# Patient Record
Sex: MALE | Race: BLACK OR AFRICAN AMERICAN | NOT HISPANIC OR LATINO | Employment: UNEMPLOYED | URBAN - METROPOLITAN AREA
[De-identification: names, ages, dates, MRNs, and addresses within clinical notes are randomized per-mention and may not be internally consistent; named-entity substitution may affect disease eponyms.]

---

## 2017-02-16 ENCOUNTER — TRANSFERRED RECORDS (OUTPATIENT)
Dept: HEALTH INFORMATION MANAGEMENT | Facility: CLINIC | Age: 67
End: 2017-02-16

## 2017-02-28 ENCOUNTER — TRANSFERRED RECORDS (OUTPATIENT)
Dept: HEALTH INFORMATION MANAGEMENT | Facility: CLINIC | Age: 67
End: 2017-02-28

## 2017-04-26 ENCOUNTER — OFFICE VISIT (OUTPATIENT)
Dept: FAMILY MEDICINE | Facility: CLINIC | Age: 67
End: 2017-04-26
Payer: COMMERCIAL

## 2017-04-26 VITALS
HEIGHT: 68 IN | DIASTOLIC BLOOD PRESSURE: 68 MMHG | OXYGEN SATURATION: 99 % | BODY MASS INDEX: 26.52 KG/M2 | SYSTOLIC BLOOD PRESSURE: 152 MMHG | WEIGHT: 175 LBS | HEART RATE: 50 BPM

## 2017-04-26 DIAGNOSIS — M48.00 SPINAL STENOSIS, UNSPECIFIED SPINAL REGION: ICD-10-CM

## 2017-04-26 DIAGNOSIS — Z79.4 TYPE 2 DIABETES MELLITUS WITH DIABETIC POLYNEUROPATHY, WITH LONG-TERM CURRENT USE OF INSULIN (H): Primary | ICD-10-CM

## 2017-04-26 DIAGNOSIS — M54.9 CHRONIC BACK PAIN, UNSPECIFIED BACK LOCATION, UNSPECIFIED BACK PAIN LATERALITY: ICD-10-CM

## 2017-04-26 DIAGNOSIS — G89.29 CHRONIC BACK PAIN, UNSPECIFIED BACK LOCATION, UNSPECIFIED BACK PAIN LATERALITY: ICD-10-CM

## 2017-04-26 DIAGNOSIS — R30.0 DYSURIA: ICD-10-CM

## 2017-04-26 DIAGNOSIS — I10 BENIGN ESSENTIAL HYPERTENSION: ICD-10-CM

## 2017-04-26 DIAGNOSIS — R35.0 URINARY FREQUENCY: ICD-10-CM

## 2017-04-26 DIAGNOSIS — R32 URINARY INCONTINENCE, UNSPECIFIED TYPE: ICD-10-CM

## 2017-04-26 DIAGNOSIS — E11.42 TYPE 2 DIABETES MELLITUS WITH DIABETIC POLYNEUROPATHY, WITH LONG-TERM CURRENT USE OF INSULIN (H): Primary | ICD-10-CM

## 2017-04-26 DIAGNOSIS — K76.82 HEPATIC ENCEPHALOPATHY (H): ICD-10-CM

## 2017-04-26 DIAGNOSIS — R15.9 BOWEL INCONTINENCE: ICD-10-CM

## 2017-04-26 PROBLEM — Z72.0 TOBACCO USE: Status: ACTIVE | Noted: 2017-01-05

## 2017-04-26 PROBLEM — M47.816 OSTEOARTHRITIS OF LUMBAR SPINE: Status: ACTIVE | Noted: 2017-04-06

## 2017-04-26 PROBLEM — G96.198 ARACHNOID CYST OF SPINE: Status: ACTIVE | Noted: 2017-04-06
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
## 2017-04-26 PROBLEM — M54.50 CHRONIC LOW BACK PAIN: Status: ACTIVE | Noted: 2017-04-06

## 2017-04-26 LAB
AMMONIA PLAS-SCNC: 50 UMOL/L (ref 10–50)
ERYTHROCYTE [DISTWIDTH] IN BLOOD BY AUTOMATED COUNT: 13.4 % (ref 10–15)
HBA1C MFR BLD: 6 % (ref 4.3–6)
HCT VFR BLD AUTO: 37.7 % (ref 40–53)
HGB BLD-MCNC: 13.2 G/DL (ref 13.3–17.7)
MCH RBC QN AUTO: 32.4 PG (ref 26.5–33)
MCHC RBC AUTO-ENTMCNC: 35 G/DL (ref 31.5–36.5)
MCV RBC AUTO: 92 FL (ref 78–100)
PLATELET # BLD AUTO: 119 10E9/L (ref 150–450)
RBC # BLD AUTO: 4.08 10E12/L (ref 4.4–5.9)
VIT B12 SERPL-MCNC: 386 PG/ML (ref 193–986)
WBC # BLD AUTO: 6.7 10E9/L (ref 4–11)

## 2017-04-26 PROCEDURE — 83036 HEMOGLOBIN GLYCOSYLATED A1C: CPT | Performed by: NURSE PRACTITIONER

## 2017-04-26 PROCEDURE — 87088 URINE BACTERIA CULTURE: CPT | Performed by: NURSE PRACTITIONER

## 2017-04-26 PROCEDURE — 36415 COLL VENOUS BLD VENIPUNCTURE: CPT | Performed by: NURSE PRACTITIONER

## 2017-04-26 PROCEDURE — 85027 COMPLETE CBC AUTOMATED: CPT | Performed by: NURSE PRACTITIONER

## 2017-04-26 PROCEDURE — 87186 SC STD MICRODIL/AGAR DIL: CPT | Performed by: NURSE PRACTITIONER

## 2017-04-26 PROCEDURE — 84443 ASSAY THYROID STIM HORMONE: CPT | Performed by: NURSE PRACTITIONER

## 2017-04-26 PROCEDURE — 80053 COMPREHEN METABOLIC PANEL: CPT | Performed by: NURSE PRACTITIONER

## 2017-04-26 PROCEDURE — 99205 OFFICE O/P NEW HI 60 MIN: CPT | Performed by: NURSE PRACTITIONER

## 2017-04-26 PROCEDURE — 87086 URINE CULTURE/COLONY COUNT: CPT | Performed by: NURSE PRACTITIONER

## 2017-04-26 PROCEDURE — 82607 VITAMIN B-12: CPT | Performed by: NURSE PRACTITIONER

## 2017-04-26 PROCEDURE — 82140 ASSAY OF AMMONIA: CPT | Performed by: NURSE PRACTITIONER

## 2017-04-26 RX ORDER — METFORMIN HCL 500 MG
2000 TABLET, EXTENDED RELEASE 24 HR ORAL
COMMUNITY
End: 2019-06-19

## 2017-04-26 RX ORDER — LISINOPRIL 10 MG/1
10 TABLET ORAL DAILY
Qty: 90 TABLET | Refills: 3 | Status: SHIPPED | OUTPATIENT
Start: 2017-04-26 | End: 2017-11-14

## 2017-04-26 RX ORDER — LOSARTAN POTASSIUM 50 MG/1
50 TABLET ORAL DAILY
COMMUNITY
End: 2017-09-19

## 2017-04-26 RX ORDER — PREGABALIN 100 MG/1
100 CAPSULE ORAL 3 TIMES DAILY
Qty: 90 CAPSULE | Refills: 1 | Status: SHIPPED | OUTPATIENT
Start: 2017-04-26 | End: 2017-11-20

## 2017-04-26 RX ORDER — PROPRANOLOL HYDROCHLORIDE 20 MG/1
20 TABLET ORAL 2 TIMES DAILY
COMMUNITY
End: 2019-06-21

## 2017-04-26 RX ORDER — SIMVASTATIN 10 MG
10 TABLET ORAL AT BEDTIME
Qty: 90 TABLET | Refills: 1 | Status: SHIPPED | OUTPATIENT
Start: 2017-04-26 | End: 2017-11-06

## 2017-04-26 RX ORDER — GABAPENTIN 300 MG/1
300 CAPSULE ORAL 3 TIMES DAILY
COMMUNITY
End: 2017-04-26
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
## 2017-04-27 LAB
ALBUMIN SERPL-MCNC: 3 G/DL (ref 3.4–5)
ALP SERPL-CCNC: 100 U/L (ref 40–150)
ALT SERPL W P-5'-P-CCNC: 15 U/L (ref 0–70)
ANION GAP SERPL CALCULATED.3IONS-SCNC: 9 MMOL/L (ref 3–14)
AST SERPL W P-5'-P-CCNC: 17 U/L (ref 0–45)
BILIRUB SERPL-MCNC: 0.8 MG/DL (ref 0.2–1.3)
BUN SERPL-MCNC: 13 MG/DL (ref 7–30)
CALCIUM SERPL-MCNC: 9.4 MG/DL (ref 8.5–10.1)
CHLORIDE SERPL-SCNC: 110 MMOL/L (ref 94–109)
CO2 SERPL-SCNC: 24 MMOL/L (ref 20–32)
CREAT SERPL-MCNC: 1.12 MG/DL (ref 0.66–1.25)
GFR SERPL CREATININE-BSD FRML MDRD: 66 ML/MIN/1.7M2
GLUCOSE SERPL-MCNC: 77 MG/DL (ref 70–99)
POTASSIUM SERPL-SCNC: 4.6 MMOL/L (ref 3.4–5.3)
PROT SERPL-MCNC: 7.3 G/DL (ref 6.8–8.8)
SODIUM SERPL-SCNC: 143 MMOL/L (ref 133–144)
TSH SERPL DL<=0.05 MIU/L-ACNC: 0.78 MU/L (ref 0.4–4)

## 2017-04-28 RX ORDER — NITROFURANTOIN 25; 75 MG/1; MG/1
100 CAPSULE ORAL 2 TIMES DAILY
Qty: 14 CAPSULE | Refills: 0 | Status: SHIPPED | OUTPATIENT
Start: 2017-04-28 | End: 2017-11-06

## 2017-04-29 LAB
BACTERIA SPEC CULT: ABNORMAL
MICRO REPORT STATUS: ABNORMAL
MICROORGANISM SPEC CULT: ABNORMAL
SPECIMEN SOURCE: ABNORMAL

## 2017-05-08 ENCOUNTER — OFFICE VISIT (OUTPATIENT)
Dept: INTERNAL MEDICINE | Facility: CLINIC | Age: 67
End: 2017-05-08

## 2017-05-08 VITALS
BODY MASS INDEX: 26.73 KG/M2 | WEIGHT: 173.2 LBS | HEART RATE: 59 BPM | DIASTOLIC BLOOD PRESSURE: 76 MMHG | SYSTOLIC BLOOD PRESSURE: 152 MMHG

## 2017-05-08 DIAGNOSIS — M54.5 CHRONIC BILATERAL LOW BACK PAIN, WITH SCIATICA PRESENCE UNSPECIFIED: Primary | ICD-10-CM

## 2017-05-08 DIAGNOSIS — R32 URINARY INCONTINENCE, UNSPECIFIED TYPE: ICD-10-CM

## 2017-05-08 DIAGNOSIS — G89.29 CHRONIC BILATERAL LOW BACK PAIN, WITH SCIATICA PRESENCE UNSPECIFIED: Primary | ICD-10-CM

## 2017-05-08 DIAGNOSIS — F17.210 CIGARETTE NICOTINE DEPENDENCE WITHOUT COMPLICATION: ICD-10-CM

## 2017-05-08 DIAGNOSIS — R30.0 DYSURIA: ICD-10-CM

## 2017-05-08 RX ORDER — NICOTINE 21 MG/24HR
1 PATCH, TRANSDERMAL 24 HOURS TRANSDERMAL EVERY 24 HOURS
Qty: 30 PATCH | Refills: 3 | Status: SHIPPED | OUTPATIENT
Start: 2017-05-08 | End: 2019-06-19

## 2017-05-08 ASSESSMENT — ENCOUNTER SYMPTOMS
NAUSEA: 0
RECTAL BLEEDING: 0
RESPIRATORY PAIN: 0
SINUS CONGESTION: 0
SLEEP DISTURBANCES DUE TO BREATHING: 0
MUSCLE WEAKNESS: 1
ORTHOPNEA: 0
BLOOD IN STOOL: 0
TREMORS: 0
SYNCOPE: 0
DYSURIA: 1
MYALGIAS: 1
EYE WATERING: 1
DIZZINESS: 1
ABDOMINAL PAIN: 0
DEPRESSION: 0
WHEEZING: 0
HALLUCINATIONS: 0
NECK MASS: 0
SEIZURES: 0
MUSCLE CRAMPS: 1
HYPOTENSION: 0
DIARRHEA: 0
NUMBNESS: 1
CONSTIPATION: 1
TINGLING: 1
HEMATURIA: 0
SNORES LOUDLY: 1
TROUBLE SWALLOWING: 1
SPEECH CHANGE: 0
COUGH DISTURBING SLEEP: 1
DYSPNEA ON EXERTION: 0
POOR WOUND HEALING: 0
TASTE DISTURBANCE: 0
SMELL DISTURBANCE: 0
SORE THROAT: 0
WEIGHT LOSS: 0
FEVER: 0
HYPERTENSION: 1
HEMOPTYSIS: 0
HEARTBURN: 0
TACHYCARDIA: 0
POLYPHAGIA: 0
NAIL CHANGES: 0
NERVOUS/ANXIOUS: 1
INCREASED ENERGY: 0
DIFFICULTY URINATING: 1
ARTHRALGIAS: 1
VOMITING: 0
LIGHT-HEADEDNESS: 1
DECREASED CONCENTRATION: 1
RECTAL PAIN: 1
HOARSE VOICE: 0
EXERCISE INTOLERANCE: 0
DECREASED APPETITE: 0
SHORTNESS OF BREATH: 1
LEG SWELLING: 1
SWOLLEN GLANDS: 0
NIGHT SWEATS: 0
WEAKNESS: 1
BRUISES/BLEEDS EASILY: 0
COUGH: 0
HEADACHES: 1
SKIN CHANGES: 0
SINUS PAIN: 0
JAUNDICE: 0
BOWEL INCONTINENCE: 0
LOSS OF CONSCIOUSNESS: 0
BACK PAIN: 1
MEMORY LOSS: 1
PANIC: 0
JOINT SWELLING: 0
EYE PAIN: 0
INSOMNIA: 1
PARALYSIS: 0
EYE IRRITATION: 0
DISTURBANCES IN COORDINATION: 0
ALTERED TEMPERATURE REGULATION: 0
FLANK PAIN: 0
LEG PAIN: 1
DOUBLE VISION: 1
FATIGUE: 0
EYE REDNESS: 0
CHILLS: 0
POSTURAL DYSPNEA: 0
POLYDIPSIA: 0
STIFFNESS: 1
CLAUDICATION: 1
WEIGHT GAIN: 0
NECK PAIN: 1
PALPITATIONS: 0
BLOATING: 0

## 2017-05-08 ASSESSMENT — PAIN SCALES - GENERAL: PAINLEVEL: EXTREME PAIN (9)
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
## 2017-05-08 NOTE — PATIENT INSTRUCTIONS
[facility] Medication Refill Request Information:  * Please contact your pharmacy regarding ANY request for medication refills.  ** [facility] Prescription Fax = [phone]  * Please allow 3 business days for routine medication refills.  * Please allow 5 business days for controlled substance medication refills.     [facility] Test Result notification information:  *You will be notified with in 7-10 days of your appointment day regarding the results of your test.  If you are on MyChart you will be notified as soon as the provider has reviewed the results and signed off on them.    [facility] [phone]   Neurosurgery [phone]   Urology [phone]

## 2017-05-08 NOTE — MR AVS SNAPSHOT
After Visit Summary   5/8/2017    Javi Celaya    MRN: [id number]           Patient Information     Date Of Birth          1950        Visit Information        Provider Department      5/8/2017 2:30 PM Carolynn Zafar MD [facility]        Today's Diagnoses     Chronic bilateral low back pain, with sciatica presence unspecified    -  1    Urinary incontinence, unspecified type        Dysuria        Cigarette nicotine dependence without complication          Care Instructions    Primary Care Center Medication Refill Request Information:  * Please contact your pharmacy regarding ANY request for medication refills.  ** [facility] Prescription Fax = [phone]  * Please allow 3 business days for routine medication refills.  * Please allow 5 business days for controlled substance medication refills.     Primary Care Center Test Result notification information:  *You will be notified with in 7-10 days of your appointment day regarding the results of your test.  If you are on MyChart you will be notified as soon as the provider has reviewed the results and signed off on them.    Primary Care Center [phone]   Neurosurgery [phone]   Urology [phone]           Follow-ups after your visit        Additional Services     NEUROSURGERY REFERRAL       Your provider has referred you to: [facility]: Neurosurgery Clinic [facility] [phone]   [email or web address]    Please be aware that coverage of these services is subject to the terms and limitations of your health insurance plan.  Call member services at your health plan with any benefit or coverage questions.      Please bring the following with you to your appointment:    (1) Any X-Rays, CTs or MRIs which have been performed.  Contact the facility where they were done to arrange for  prior to your scheduled appointment.   (2) List of current medications  (3) This referral request    (4) Any documents/labs given to you for this referral            UROLOGY ADULT REFERRAL       Your provider has referred you to: [facility]: [place] [facility] [place] [phone]   [email or web address]    Please be aware that coverage of these services is subject to the terms and limitations of your health insurance plan.  Call member services at your health plan with any benefit or coverage questions.      Please bring the following with you to your appointment:    (1) Any X-Rays, CTs or MRIs which have been performed.  Contact the facility where they were done to arrange for  prior to your scheduled appointment.    (2) List of current medications  (3) This referral request   (4) Any documents/labs given to you for this referral                  Follow-up notes from your care team     Return in about 3 months (around 8/8/2017).      Your next 10 appointments already scheduled     Jul 19, 2017  9:00 AM CDT   (Arrive by 8:45 AM)   New Patient Visit with Miguel Angel Cooney [facility] ([facility])    [address] [place] [id number] [phone]              Future tests that were ordered for you today     Open Future Orders        Priority Expected Expires Ordered    UA with Micro reflex to Culture Routine 5/8/2017 5/8/2018 5/8/2017            Who to contact     Please call your clinic at [phone] to:    Ask questions about your health    Make or cancel appointments    Discuss your medicines    Learn about your test results    Speak to your doctor   If you have compliments or concerns about an experience at your clinic, or if you wish to file a complaint, please contact [facility] Patient Relations at [phone] or email us at [email or web address]         Additional Information  About Your Visit        Computer[employer] Information     [employer] is an electronic gateway that provides easy, online access to your medical records. With [employer], you can request a clinic appointment, read your test results, renew a prescription or communicate with your care team.     To sign up for [employer] visit the website at [email or web address]   You will be asked to enter the access code listed below, as well as some personal information. Please follow the directions to create your username and password.     Your access code is: [email or web address]  Expires: 2017  2:00 PM     Your access code will  in 90 days. If you need help or a new code, please contact your [facility] or call [phone] for assistance.        Care EveryWhere ID     This is your Care EveryWhere ID. This could be used by other organizations to access your [place] medical records  [id number]        Your Vitals Were     Pulse BMI (Body Mass Index)                59 26.73 kg/m2           Blood Pressure from Last 3 Encounters:   17 152/76   17 152/68    Weight from Last 3 Encounters:   17 78.6 kg (173 lb 3.2 oz)   17 79.4 kg (175 lb)              We Performed the Following     NEUROSURGERY REFERRAL     UROLOGY ADULT REFERRAL          Today's Medication Changes          These changes are accurate as of: 17  3:16 PM.  If you have any questions, ask your nurse or doctor.               Start taking these medicines.        Dose/Directions    nicotine 21 MG/24HR 24 hr patch   Commonly known as:  NICODERM CQ   Used for:  Cigarette nicotine dependence without complication   Started by:  Carolynn Zafar MD        Dose:  1 patch   Place 1 patch onto the skin every 24 hours   Quantity:  30 patch   Refills:  3            Where to get your medicines      [place] [facility] [place] [address], [place]  [address]     Phone:  [phone]     nicotine 21 MG/24HR 24 hr patch                Primary Care Provider Office Phone # Fax #    Carolynn Sariah Reardon [phone]       [address]  [facility] [address]        Thank you!     Thank you for choosing [facility]  for your care. Our goal is always to provide you with excellent care. Hearing back from our patients is one way we can continue to improve our services. Please take a few minutes to complete the written survey that you may receive in the mail after your visit with us. Thank you!             Your Updated Medication List - Protect others around you: Learn how to safely use, store and throw away your medicines at www.disposemymeds.org.          This list is accurate as of: 5/8/17  3:16 PM.  Always use your most recent med list.                   Brand Name Dispense Instructions for use    aspirin 81 MG EC tablet     90 tablet    Take 1 tablet (81 mg) by mouth daily       lisinopril 10 MG tablet    PRINIVIL/ZESTRIL    90 tablet    Take 1 tablet (10 mg) by mouth daily       losartan 50 MG tablet    COZAAR     Take 50 mg by mouth daily       metFORMIN 500 MG 24 hr tablet    GLUCOPHAGE-XR     Take 2,000 mg by mouth daily (with dinner)       nicotine 21 MG/24HR 24 hr patch    NICODERM CQ    30 patch    Place 1 patch onto the skin every 24 hours       nitrofurantoin (macrocrystal-monohydrate) 100 MG capsule    MACROBID    14 capsule    Take 1 capsule (100 mg) by mouth 2 times daily       pregabalin 100 MG capsule    LYRICA    90 capsule    Take 1 capsule (100 mg) by mouth 3 times daily       propranolol 20 MG tablet    INDERAL     Take 20 mg by mouth 2 times daily       simvastatin 10 MG tablet    ZOCOR    90 tablet    Take 1 tablet (10 mg) by mouth At Bedtime       XIFAXAN 550 MG Tabs tablet   Generic drug:  rifaximin      Take 200 mg by mouth 2 times daily
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
## 2017-05-26 ENCOUNTER — THERAPY VISIT (OUTPATIENT)
Dept: PHYSICAL THERAPY | Facility: CLINIC | Age: 67
End: 2017-05-26
Payer: COMMERCIAL

## 2017-05-26 DIAGNOSIS — M54.42 CHRONIC BILATERAL LOW BACK PAIN WITH BILATERAL SCIATICA: Primary | ICD-10-CM

## 2017-05-26 DIAGNOSIS — M54.41 CHRONIC BILATERAL LOW BACK PAIN WITH BILATERAL SCIATICA: Primary | ICD-10-CM

## 2017-05-26 DIAGNOSIS — G89.29 CHRONIC BILATERAL LOW BACK PAIN WITH BILATERAL SCIATICA: Primary | ICD-10-CM

## 2017-05-26 PROCEDURE — 97530 THERAPEUTIC ACTIVITIES: CPT | Mod: GP | Performed by: PHYSICAL THERAPIST

## 2017-05-26 PROCEDURE — 97162 PT EVAL MOD COMPLEX 30 MIN: CPT | Mod: GP | Performed by: PHYSICAL THERAPIST

## 2017-05-26 NOTE — MR AVS SNAPSHOT
After Visit Summary   5/26/2017    Javi Celaya    MRN: [id number]           Patient Information     Date Of Birth          1950        Visit Information        Provider Department      5/26/2017 1:20 PM Natalie Manzano PT [facility]        Today's Diagnoses     Chronic bilateral low back pain with bilateral sciatica    -  1       Follow-ups after your visit        Your next 10 appointments already scheduled     Jun 02, 2017  1:20 PM CDT   ALEX Spine with Natalie Manzano PT   [facility] ([facility]    [address] [place] [address] 5th [facility] [id number] [phone]            Jun 26, 2017  4:00 PM CDT   (Arrive by 3:45 PM)   New Patient Visit with Usman Em MD   [facility] ([facility]    [address] [place] [id number] [phone]            Jul 19, 2017  9:00 AM CDT   (Arrive by 8:45 AM)   New Patient Visit with Miguel Angel Cooney DO   [facility] ([facility]    [address] [place] [id number] [phone]              Who to contact     If you have questions or need follow up information about today's clinic visit or your schedule please contact [facility] THER[facility] directly at [phone].  Normal or non-critical lab and imaging results will be communicated to you by MyChart, letter or phone within 4 business days after the clinic has received the results. If you do not hear from us within 7 days, please contact the clinic through MyChart or phone. If you have a critical or abnormal lab result, we will notify you by phone as soon as possible.  Submit refill requests through [employer] or call your pharmacy and they will forward the refill request to us. Please allow 3 business days for your refill to  "be completed.          Additional Information About Your Visit        [employer] Information     [employer] lets you send messages to your doctor, view your test results, renew your prescriptions, schedule appointments and more. To sign up, go to [email or web address] . Click on \"Log in\" on the left side of the screen, which will take you to the Welcome page. Then click on \"Sign up Now\" on the right side of the page.     You will be asked to enter the access code listed below, as well as some personal information. Please follow the directions to create your username and password.     Your access code is: [email or web address]  Expires: 2017  2:00 PM     Your access code will  in 90 days. If you need help or a new code, please call your [place] clinic or [phone].        Care EveryWhere ID     This is your Care EveryWhere ID. This could be used by other organizations to access your [place] medical records  [id number]         Blood Pressure from Last 3 Encounters:   17 152/76   17 152/68    Weight from Last 3 Encounters:   17 78.6 kg (173 lb 3.2 oz)   17 79.4 kg (175 lb)              We Performed the Following     HC PT EVAL, MODERATE COMPLEXITY     ALEX INITIAL EVAL REPORT     THERAPEUTIC ACTIVITIES        Primary Care Provider Office Phone # Fax #    Carolynn Sariah Reardon [phone]       [address]  [facility] [address]        Thank you!     Thank you for choosing [facility]  for your care. Our goal is always to provide you with excellent care. Hearing back from our patients is one way we can continue to improve our services. Please take a few minutes to complete the written survey that you may receive in the mail after your visit with us. Thank you!             Your Updated Medication List - Protect others around you: Learn how to safely use, store and throw away your medicines at www.disposemymeds.org.        "   This list is accurate as of: 5/26/17  3:34 PM.  Always use your most recent med list.                   Brand Name Dispense Instructions for use    aspirin 81 MG EC tablet     90 tablet    Take 1 tablet (81 mg) by mouth daily       lisinopril 10 MG tablet    PRINIVIL/ZESTRIL    90 tablet    Take 1 tablet (10 mg) by mouth daily       losartan 50 MG tablet    COZAAR     Take 50 mg by mouth daily       metFORMIN 500 MG 24 hr tablet    GLUCOPHAGE-XR     Take 2,000 mg by mouth daily (with dinner)       nicotine 21 MG/24HR 24 hr patch    NICODERM CQ    30 patch    Place 1 patch onto the skin every 24 hours       nitrofurantoin (macrocrystal-monohydrate) 100 MG capsule    MACROBID    14 capsule    Take 1 capsule (100 mg) by mouth 2 times daily       pregabalin 100 MG capsule    LYRICA    90 capsule    Take 1 capsule (100 mg) by mouth 3 times daily       propranolol 20 MG tablet    INDERAL     Take 20 mg by mouth 2 times daily       simvastatin 10 MG tablet    ZOCOR    90 tablet    Take 1 tablet (10 mg) by mouth At Bedtime       XIFAXAN 550 MG Tabs tablet   Generic drug:  rifaximin      Take 200 mg by mouth 2 times daily
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
## 2017-05-30 ENCOUNTER — PRE VISIT (OUTPATIENT)
Dept: UROLOGY | Facility: CLINIC | Age: 67
End: 2017-05-30

## 2017-06-05 PROBLEM — G89.29 CHRONIC LOW BACK PAIN: Status: RESOLVED | Noted: 2017-04-06 | Resolved: 2017-06-05

## 2017-06-05 PROBLEM — M54.50 CHRONIC LOW BACK PAIN: Status: RESOLVED | Noted: 2017-04-06 | Resolved: 2017-06-05

## 2017-06-08 ENCOUNTER — MEDICAL CORRESPONDENCE (OUTPATIENT)
Dept: HEALTH INFORMATION MANAGEMENT | Facility: CLINIC | Age: 67
End: 2017-06-08

## 2017-06-15 ENCOUNTER — DOCUMENTATION ONLY (OUTPATIENT)
Dept: VASCULAR SURGERY | Facility: CLINIC | Age: 67
End: 2017-06-15

## 2017-06-15 DIAGNOSIS — Z13.6 ENCOUNTER FOR ABDOMINAL AORTIC ANEURYSM (AAA) SCREENING: Primary | ICD-10-CM

## 2017-06-22 ENCOUNTER — PRE VISIT (OUTPATIENT)
Dept: UROLOGY | Facility: CLINIC | Age: 67
End: 2017-06-22

## 2017-07-11 ENCOUNTER — PRE VISIT (OUTPATIENT)
Dept: UROLOGY | Facility: CLINIC | Age: 67
End: 2017-07-11

## 2017-07-11 NOTE — TELEPHONE ENCOUNTER
Patient with history of urinary incontinence coming in for consult. Patient contacted via phone and told to bring pads from the day before along with an unused dry one. Patient stated understanding.

## 2017-07-13 ENCOUNTER — PRE VISIT (OUTPATIENT)
Dept: ANESTHESIOLOGY | Facility: CLINIC | Age: 67
End: 2017-07-13

## 2017-07-13 NOTE — TELEPHONE ENCOUNTER
1.  Date/reason for appt: 7/21/17 9:40AM Chronic back pain, unspecified back location, unspecified back pain laterality Spinal stenosis, unspecified spinal region  2.  Referring provider: Amalia PACK CNP   3.  Call to patient (Yes / No - short description): No, recs are at .   4.  Previous care at / records requested from:  [facility] Beatrice Reardon MD -- Recs are in [facility] Jatinder SHAY - Attempt to fax cover sheet to [email or web address]. [email or web address]

## 2017-07-14 NOTE — TELEPHONE ENCOUNTER
Records received from [facility].   Included  ED notes: 4/18/12  Radiology reports: US venous duplex lower ext right on 4/18/12  Other: labs

## 2017-07-17 ENCOUNTER — OFFICE VISIT (OUTPATIENT)
Dept: UROLOGY | Facility: CLINIC | Age: 67
End: 2017-07-17

## 2017-07-17 VITALS
BODY MASS INDEX: 25.62 KG/M2 | DIASTOLIC BLOOD PRESSURE: 73 MMHG | WEIGHT: 173 LBS | HEART RATE: 85 BPM | HEIGHT: 69 IN | SYSTOLIC BLOOD PRESSURE: 152 MMHG

## 2017-07-17 DIAGNOSIS — N31.9 NEUROGENIC BLADDER: Primary | ICD-10-CM

## 2017-07-17 ASSESSMENT — ENCOUNTER SYMPTOMS
RECTAL PAIN: 0
HOARSE VOICE: 1
BOWEL INCONTINENCE: 0
JOINT SWELLING: 0
BACK PAIN: 1
HEADACHES: 0
EYE WATERING: 1
INSOMNIA: 1
WEIGHT LOSS: 1
BLOOD IN STOOL: 0
TREMORS: 0
DIARRHEA: 0
NUMBNESS: 1
HEARTBURN: 0
LOSS OF CONSCIOUSNESS: 0
WEAKNESS: 1
HEMATURIA: 0
NIGHT SWEATS: 1
PARALYSIS: 0
CHILLS: 0
FLANK PAIN: 0
POLYDIPSIA: 0
MUSCLE CRAMPS: 1
DECREASED CONCENTRATION: 1
TINGLING: 1
INCREASED ENERGY: 1
SINUS PAIN: 0
TASTE DISTURBANCE: 0
DISTURBANCES IN COORDINATION: 0
SINUS CONGESTION: 0
ARTHRALGIAS: 1
PANIC: 0
ALTERED TEMPERATURE REGULATION: 0
NECK MASS: 0
DIZZINESS: 1
CONSTIPATION: 1
BLOATING: 1
EYE PAIN: 0
EYE REDNESS: 0
FEVER: 0
NERVOUS/ANXIOUS: 1
DIFFICULTY URINATING: 1
JAUNDICE: 0
FATIGUE: 1
ABDOMINAL PAIN: 1
SORE THROAT: 0
MYALGIAS: 1
NECK PAIN: 1
SMELL DISTURBANCE: 0
TROUBLE SWALLOWING: 1
STIFFNESS: 1
SEIZURES: 0
DOUBLE VISION: 1
DECREASED APPETITE: 1
SPEECH CHANGE: 0
EYE IRRITATION: 0
MUSCLE WEAKNESS: 1
WEIGHT GAIN: 0
HALLUCINATIONS: 0
DEPRESSION: 0
POLYPHAGIA: 0
NAUSEA: 0
MEMORY LOSS: 1

## 2017-07-17 ASSESSMENT — PAIN SCALES - GENERAL: PAINLEVEL: NO PAIN (0)

## 2017-07-17 NOTE — MR AVS SNAPSHOT
After Visit Summary   7/17/2017    Javi Celaya    MRN: [id number]           Patient Information     Date Of Birth          1950        Visit Information        Provider Department      7/17/2017 9:00 AM Usman Em MD [facility]rology and [facility]        Today's Diagnoses     Neurogenic bladder    -  1      Care Instructions    Schedule UDS, lab, and renal US.       Urodynamic Testing  What You Need to Know  What is urodynamic testing?  Urodynamic testing is the best way for your doctor to look at the function of your bladder. It is like an EKG, which is used to look at the function of your heart.  The test takes between 60 and 90 minutes. You will spend about 11/2 to 2 hours in your doctor s office. For most patients, the test is not painful.  How should I get ready for this test?   Arrive with a full bladder, if you are able. (Try to drink six 8-ounce glasses of liquid one hour before your exam.)    You may want to wear loose clothing.   Make sure your skin below the waist is clean and dry (no lotions or powders).   If you have any of the following symptoms before the test, please call the clinic right away:Having to urinate more often or feeling a sudden urge to go   Feeling pain or burning when you urinate   Fever or chills   Smelly or cloudy urine      If we gave you a bladder diary, please complete and bring it with you.    What happens during this test?  1. You will empty your bladder into a special toilet. The toilet measures your rate of urine flow.  2. We will then place a very small tube (catheter) into your bladder. This drains any urine that is left. We will measure the amount of urine.  3. We will place a small tube in your rectum. We ll also put a tiny patch of electrodes on the skin near the anus.  4. A computer will measure the pressure in your bladder and how well your sphincter is working. (The sphincter is the muscle that controls the  bladder.)  5. Your bladder will slowly fill with a mixture of saline and contrast medium. We will take images, similar to an Xray. You will tell us when your bladder feels a bit full, quite full and completely full.  6. We will ask you to bear down several times. As you do, we will check for leaking urine.  7. You will again empty your bladder into the special toilet.    What happens after the test?  Your doctor will share the results on the day of the exam, or soon after.  After your test, you may go about your day as normal. You may notice some blood in your urine. You may feel a more urgent need to use the toilet, or you may need to go more often. These effects should improve in the next few days.    It was a pleasure meeting with you today.  Thank you for allowing me and my team the privilege of caring for you today.  YOU are the reason we are here, and I truly hope we provided you with the excellent service you deserve.  Please let us know if there is anything else we can do for you so that we can be sure you are leaving completely satisfied with your care experience.                  Follow-ups after your visit        Your next 10 appointments already scheduled     Jul 21, 2017  9:40 AM CDT   (Arrive by 9:25 AM)   New Patient Visit with Fransisco Estrada MD   [facility] ([facility])    [address] [place] [address]  4th Floor  [facility] [address]   [phone]            Jul 31, 2017  3:00 PM CDT   (Arrive by 2:45 PM)   New Patient Visit with Rosemary Peacock PA-C   [facility] ([facility])    [address] [place] [address]  3rd Floor  [facility] [address]   [phone]            Aug 09, 2017 10:30 AM CDT   US RENAL COMPLETE with UCUS2   [facility] US ([facility])    [address] [place] [address]  1st Floor  [facility] [id number] [phone]           Please bring a list of your  medicines (including vitamins, minerals and over-the-counter drugs). Also, tell your doctor about any allergies you may have. Wear comfortable clothes and leave your valuables at home.  You do not need to do anything special to prepare for your exam.  Please call the Imaging Department at your exam site with any questions.            Aug 09, 2017 11:30 AM CDT   (Arrive by 11:15 AM)   Urodynamics with Ashley Simeon PA-C   [facility] and [facility]state [facility] Urologic C[facility] ([facility])    [address] [place] [id number] [phone]            Aug 21, 2017  9:30 AM CDT   (Arrive by 9:15 AM)   Return Visit with Usman Em MD   [facility] and [facility] ([facility])    [address] [place] [id number] [phone]              Future tests that were ordered for you today     Open Future Orders        Priority Expected Expires Ordered    Creatinine Routine  7/17/2018 7/17/2017    Renal US Routine  7/17/2018 7/17/2017            Who to contact     Please call your clinic at [phone] to:    Ask questions about your health    Make or cancel appointments    Discuss your medicines    Learn about your test results    Speak to your doctor   If you have compliments or concerns about an experience at your clinic, or if you wish to file a complaint, please contact [facility] Patient Relations at [phone] or email us at [email or web address]         Additional Information About Your Visit        [employer] Information     [employer] is an electronic gateway that provides easy, online access to your medical records. With [employer], you can request a clinic appointment, read your test results, renew a prescription or communicate with your care team.     To sign up for [employer] visit the website at [email or web address]   You  "will be asked to enter the access code listed below, as well as some personal information. Please follow the directions to create your username and password.     Your access code is: [email or web address]  Expires: 2017  2:00 PM     Your access code will  in 90 days. If you need help or a new code, please contact your [facility] or call [phone] for assistance.        Care EveryWhere ID     This is your Care EveryWhere ID. This could be used by other organizations to access your [facility] medical records  [id number]        Your Vitals Were     Pulse Height BMI (Body Mass Index)             85 1.753 m (5' 9\") 25.55 kg/m2          Blood Pressure from Last 3 Encounters:   17 152/73   17 149/68   17 152/76    Weight from Last 3 Encounters:   17 78.5 kg (173 lb)   17 81.2 kg (179 lb)   17 78.6 kg (173 lb 3.2 oz)              We Performed the Following     COMPLEX UROFLOWMETRY     POST-VOID RESIDUAL BLADDER SCAN        Primary Care Provider Office Phone # Fax #    Carolynn Sariah Reardon [phone]       [address] [address]        Equal Access to Services     JOSSELIN JENNINGS : Hadii aad ku hadasho Soomaali, waaxda luqadaha, qaybta kaalmada adeegyada, dickson baron. So [facility] [phone].    ATENCIÓN: Si habla español, tiene a julio disposición servicios gratuitos de asistencia lingüística. Llame al [phone].    We comply with applicable federal civil rights laws and [place] laws. We do not discriminate on the basis of race, color, national origin, age, disability sex, sexual orientation or gender identity.            Thank you!     Thank you for choosing [facility] UROLOGY AND [facility] FOR PROSTATE AND UROLOGIC CANCERS  for your care. Our goal is always to provide you with excellent care. Hearing back from our patients is one way we can continue to improve our " services. Please take a few minutes to complete the written survey that you may receive in the mail after your visit with us. Thank you!             Your Updated Medication List - Protect others around you: Learn how to safely use, store and throw away your medicines at www.disposemymeds.org.          This list is accurate as of: 7/17/17 11:04 AM.  Always use your most recent med list.                   Brand Name Dispense Instructions for use Diagnosis    aspirin 81 MG EC tablet     90 tablet    Take 1 tablet (81 mg) by mouth daily    Type 2 diabetes mellitus with diabetic polyneuropathy, with long-term current use of insulin (H)       lisinopril 10 MG tablet    PRINIVIL/ZESTRIL    90 tablet    Take 1 tablet (10 mg) by mouth daily    Type 2 diabetes mellitus with diabetic polyneuropathy, with long-term current use of insulin (H)       losartan 50 MG tablet    COZAAR     Take 50 mg by mouth daily        metFORMIN 500 MG 24 hr tablet    GLUCOPHAGE-XR     Take 2,000 mg by mouth daily (with dinner)        nicotine 21 MG/24HR 24 hr patch    NICODERM CQ    30 patch    Place 1 patch onto the skin every 24 hours    Cigarette nicotine dependence without complication       nitroFURantoin (macrocrystal-monohydrate) 100 MG capsule    MACROBID    14 capsule    Take 1 capsule (100 mg) by mouth 2 times daily    Dysuria       pregabalin 100 MG capsule    LYRICA    90 capsule    Take 1 capsule (100 mg) by mouth 3 times daily    Type 2 diabetes mellitus with diabetic polyneuropathy, with long-term current use of insulin (H), Spinal stenosis, unspecified spinal region, Chronic back pain, unspecified back location, unspecified back pain laterality       propranolol 20 MG tablet    INDERAL     Take 20 mg by mouth 2 times daily        simvastatin 10 MG tablet    ZOCOR    90 tablet    Take 1 tablet (10 mg) by mouth At Bedtime    Type 2 diabetes mellitus with diabetic polyneuropathy, with long-term current use of insulin (H)       XIFAXAN  550 MG Tabs tablet   Generic drug:  rifaximin      Take 200 mg by mouth 2 times daily

## 2017-07-17 NOTE — PATIENT INSTRUCTIONS
Schedule UDS, lab, and renal US.       Urodynamic Testing  What You Need to Know  What is urodynamic testing?  Urodynamic testing is the best way for your doctor to look at the function of your bladder. It is like an EKG, which is used to look at the function of your heart.  The test takes between 60 and 90 minutes. You will spend about 11/2 to 2 hours in your doctor s office. For most patients, the test is not painful.  How should I get ready for this test?   Arrive with a full bladder, if you are able. (Try to drink six 8-ounce glasses of liquid one hour before your exam.)    You may want to wear loose clothing.   Make sure your skin below the waist is clean and dry (no lotions or powders).   If you have any of the following symptoms before the test, please call the clinic right away:Having to urinate more often or feeling a sudden urge to go   Feeling pain or burning when you urinate   Fever or chills   Smelly or cloudy urine      If we gave you a bladder diary, please complete and bring it with you.    What happens during this test?  1. You will empty your bladder into a special toilet. The toilet measures your rate of urine flow.  2. We will then place a very small tube (catheter) into your bladder. This drains any urine that is left. We will measure the amount of urine.  3. We will place a small tube in your rectum. We ll also put a tiny patch of electrodes on the skin near the anus.  4. A computer will measure the pressure in your bladder and how well your sphincter is working. (The sphincter is the muscle that controls the bladder.)  5. Your bladder will slowly fill with a mixture of saline and contrast medium. We will take images, similar to an Xray. You will tell us when your bladder feels a bit full, quite full and completely full.  6. We will ask you to bear down several times. As you do, we will check for leaking urine.  7. You will again empty your bladder into the special toilet.    What happens after  the test?  Your doctor will share the results on the day of the exam, or soon after.  After your test, you may go about your day as normal. You may notice some blood in your urine. You may feel a more urgent need to use the toilet, or you may need to go more often. These effects should improve in the next few days.    It was a pleasure meeting with you today.  Thank you for allowing me and my team the privilege of caring for you today.  YOU are the reason we are here, and I truly hope we provided you with the excellent service you deserve.  Please let us know if there is anything else we can do for you so that we can be sure you are leaving completely satisfied with your care experience.

## 2017-07-17 NOTE — TELEPHONE ENCOUNTER
Radiology reports received from [facility]. Images pushed.   MR cervical 4/28/17  Xray lumbar 3/16/17  MR lumbar 1/26/17  US venous 4/18/12
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
## 2017-07-19 ENCOUNTER — MEDICAL CORRESPONDENCE (OUTPATIENT)
Dept: HEALTH INFORMATION MANAGEMENT | Facility: CLINIC | Age: 67
End: 2017-07-19

## 2017-07-19 ENCOUNTER — TELEPHONE (OUTPATIENT)
Dept: ANESTHESIOLOGY | Facility: CLINIC | Age: 67
End: 2017-07-19

## 2017-07-19 NOTE — TELEPHONE ENCOUNTER
Reminder call placed to pt.   Pt reminded of Provider, date and time of the appointment.   Pt was asked to arrive 15 minutes early to fill out the questionnaires.   Clinic phone number provided if pt needed to reschedule.     Phone is not in service. Unable to leave .   Alia Trevino CMA

## 2017-07-21 ENCOUNTER — OFFICE VISIT (OUTPATIENT)
Dept: ANESTHESIOLOGY | Facility: CLINIC | Age: 67
End: 2017-07-21

## 2017-07-21 VITALS
WEIGHT: 181.9 LBS | SYSTOLIC BLOOD PRESSURE: 134 MMHG | HEART RATE: 50 BPM | OXYGEN SATURATION: 100 % | HEIGHT: 69 IN | DIASTOLIC BLOOD PRESSURE: 66 MMHG | BODY MASS INDEX: 26.94 KG/M2

## 2017-07-21 DIAGNOSIS — M47.819 FACET ARTHROPATHY: Primary | ICD-10-CM

## 2017-07-21 RX ORDER — MELOXICAM 7.5 MG/1
7.5 TABLET ORAL DAILY
Qty: 30 TABLET | Refills: 1 | Status: SHIPPED | OUTPATIENT
Start: 2017-07-21 | End: 2019-06-19

## 2017-07-21 ASSESSMENT — ENCOUNTER SYMPTOMS
ALTERED TEMPERATURE REGULATION: 1
DIZZINESS: 1
INCREASED ENERGY: 1
NAUSEA: 1
HOARSE VOICE: 1
NECK PAIN: 1
NUMBNESS: 1
EYE PAIN: 1
STIFFNESS: 1
WEIGHT GAIN: 0
SPEECH CHANGE: 1
FEVER: 0
NERVOUS/ANXIOUS: 1
TINGLING: 1
PARALYSIS: 0
DISTURBANCES IN COORDINATION: 1
EYE WATERING: 1
POLYPHAGIA: 0
CONSTIPATION: 1
SINUS CONGESTION: 1
DECREASED CONCENTRATION: 1
WEIGHT LOSS: 1
FLANK PAIN: 0
MUSCLE WEAKNESS: 1
INSOMNIA: 1
DEPRESSION: 1
MYALGIAS: 1
BLOOD IN STOOL: 0
NECK MASS: 0
SEIZURES: 0
PANIC: 0
DIARRHEA: 0
HEADACHES: 0
MUSCLE CRAMPS: 1
SORE THROAT: 0
TROUBLE SWALLOWING: 1
DOUBLE VISION: 1
VOMITING: 0
ARTHRALGIAS: 1
RECTAL PAIN: 1
JAUNDICE: 0
BACK PAIN: 1
HALLUCINATIONS: 1
WEAKNESS: 1
MEMORY LOSS: 1
EYE REDNESS: 0
TREMORS: 0
SMELL DISTURBANCE: 0
ABDOMINAL PAIN: 0
RECTAL BLEEDING: 0
NIGHT SWEATS: 1
EYE IRRITATION: 1
BLOATING: 0
DYSURIA: 1
LOSS OF CONSCIOUSNESS: 0
DECREASED APPETITE: 1
POLYDIPSIA: 0
BOWEL INCONTINENCE: 0
FATIGUE: 1
DIFFICULTY URINATING: 1
JOINT SWELLING: 0
TASTE DISTURBANCE: 0
HEMATURIA: 0
CHILLS: 0
HEARTBURN: 0
SINUS PAIN: 0

## 2017-07-21 ASSESSMENT — PAIN SCALES - GENERAL: PAINLEVEL: EXTREME PAIN (9)

## 2017-07-21 ASSESSMENT — ANXIETY QUESTIONNAIRES
7. FEELING AFRAID AS IF SOMETHING AWFUL MIGHT HAPPEN: SEVERAL DAYS
3. WORRYING TOO MUCH ABOUT DIFFERENT THINGS: NEARLY EVERY DAY
2. NOT BEING ABLE TO STOP OR CONTROL WORRYING: MORE THAN HALF THE DAYS
GAD7 TOTAL SCORE: 14
5. BEING SO RESTLESS THAT IT IS HARD TO SIT STILL: NOT AT ALL
1. FEELING NERVOUS, ANXIOUS, OR ON EDGE: NEARLY EVERY DAY
GAD7 TOTAL SCORE: 14
7. FEELING AFRAID AS IF SOMETHING AWFUL MIGHT HAPPEN: SEVERAL DAYS
6. BECOMING EASILY ANNOYED OR IRRITABLE: NEARLY EVERY DAY
4. TROUBLE RELAXING: MORE THAN HALF THE DAYS
GAD7 TOTAL SCORE: 14

## 2017-07-21 ASSESSMENT — PATIENT HEALTH QUESTIONNAIRE - PHQ9
SUM OF ALL RESPONSES TO PHQ QUESTIONS 1-9: 18
SUM OF ALL RESPONSES TO PHQ QUESTIONS 1-9: 18
10. IF YOU CHECKED OFF ANY PROBLEMS, HOW DIFFICULT HAVE THESE PROBLEMS MADE IT FOR YOU TO DO YOUR WORK, TAKE CARE OF THINGS AT HOME, OR GET ALONG WITH OTHER PEOPLE: EXTREMELY DIFFICULT

## 2017-07-21 NOTE — MR AVS SNAPSHOT
After Visit Summary   7/21/2017    Javi Celaya    MRN: [id number]           Patient Information     Date Of Birth          1950        Visit Information        Provider Department      7/21/2017 9:40 AM Fransisco Estrada MD [facility] [place] for Comprehensive [facility]        Today's Diagnoses     Facet arthropathy    -  1      Care Instructions    1. Prescription for Mobic sent to Pharmacy. Take 7.5 mg daily.     2. Schedule your Bilateral Lumbar Medial Branch Block     Please call Clotilde at [phone] to schedule your procedure. She is available Monday - Friday from 9-5:30pm, if she is unavailable to take your call, please leave her a voice message with your name, birth date, and phone number and she will call you back.    Your procedure: Bilateral Lumbar Medial Branch Block    On the day of the procedure  1. Arrive 1 hour earlier than your scheduled time, to the Clinics and Surgery Center  Address: [address] [place] [id number]  2. Check in on the 5th floor for your procedure    A nurse will call you 1 hour after your procedure to follow up.    If you must reschedule your procedure more than two times, you must follow up in clinic before rescheduling again.      Patient Pre-Procedure Instructions    CAUTION - FAILURE TO FOLLOW THESE PRE-PROCEDURE INSTRUCTIONS WILL RESULT IN YOUR PROCEDURE BEING RESCHEDULED.            You MUST have a  TO TAKE YOU HOME after your procedure. Transportation by Taxi or Para-transit must have a responsible adult accompany you home (other than the ). Travel by bus or light rail is not acceptable transportation. You must provide your 's full name and contact number at time of check in.   Fasting Protocol You may have NOTHING SOLID TO EAT FOR 8 HOURS prior to arrival at the procedure area.   Broth and candy are considered solid food and require an eight hour fast.   You may have CLEAR LIQUIDS UP TO 2 HOURS prior to  arrival at the clinic.   Clear liquids include water, clear fruit juice (no pulp) carbonated beverages, ice, black coffee, black tea (no milk or cream), chewing gum (un-swallowed), and/or clear jello (no fruit or milk). No alcohol containing beverages.   Medications If you take any medications,  DO NOT STOP. Take your medications as usual the morning of your procedure with a sip of water AT LEAST 2 HOURS PRIOR TO ARRIVAL.    Antibiotics If you are currently taking antibiotics, you must complete the entire dose 7 days prior to your scheduled procedure. You must be clear of any signs or symptoms of infection. If you begin antibiotics, please contact our clinic for instructions.   Fever, Chills, or Rash If you experience a fever of higher than 100 degrees, chills, rash, or open wounds during the one week prior to your procedure, please call the clinic.         Medication Hold List  **Patients under Cardiology/Neurology care should consult their provider prior to the pain procedure to verify pre-procedure medication instructions. The information below contains general guidelines.**    Blood Thinners If you are taking daily ASPIRIN, PLAVIX, OR OTHER BLOOD THINNERS SUCH AS COUMADIN/WARFARIN, we will need your prescribing doctor to sign a release permitting you to stop these medications. Once approved by your prescribing doctor - STOP ALL BLOOD THINNERS BASED ON THE TIME TABLE BELOW PRIOR TO YOUR PROCEDURE. If you have been instructed to stop WARFARIN(COUMADIN), you must have an INR lab drawn the day before your procedure. . Your INR must be within normal limits before we can perform your injection. MEDICATIONS CAN BE RESTARTED AFTER YOUR PROCEDURE.    14 DAY HOLD  Ticlid (ticlopidine)    10 DAY HOLD  Effient (Prasugel)    3 DAY HOLD  Xarelto (rivaroxaban) 7 DAY HOLD  Anacin, Bufferin, Ecotrin, Excedrin, Aggrenox (Aspirin)  Brilinta (ticagrelor)  Coumadin (Warfarin)  Pradexa (Dabigatran)  Elmiron (Pentosan)  Plavix  (Clopidogrel Bisulfate)  Pletal (Cilostazol)    24 DAY HOLD  Lovenox (enoxaparin)  Agrylin (Anagrelide)        Non-steroidal Anti-inflammatories (NSAIDs) DO NOT TAKE any non-steroidal anti-inflammatory medications (NSAIDs) listed on the table below. MEDICATIONS CAN BE RESTARTED AFTER YOUR PROCEDURE. Celebrex is OK to take and does not need to be discontinued.     Medications to stop:  3 DAY HOLD  Advil, Motrin (Ibuprofen)  Arthrotec (diciofenac sodium/misoprostol)  Clinoril (Sulindac)  Indocin (Indomethacin)  Lodine (Etodolac)  Toradol (Ketorolac)  Vicoprofen (Hydrocodone and Ibuprofen)  Voltaren (Diclotenac)    14 DAY HOLD  Daypro (Oxaprozin)  Feldene (Piroxicam)   7 DAY HOLD  Aleve (Naproxen sodium)  Darvon compound (contains aspirin)  Naprosyn (Naproxen)  Norgesic Forte (contains aspirin)  Mobic (Meloxicam)  Oruvall (Ketoprofen)  Percodan (contains aspirin)  Relafen (Nabumetone)  Salsalate  Trilisate  Vitamin E (more than 400 mg per day)  Any medication containing aspirin                To speak with a nurse, schedule/reschedule/cancel a clinic appointment, or request a medication refill call: [phone]     You can also reach us by Virax: [email or web address]      For refills, please call on Monday, 1 week before your medication runs out. The doctors are not always in clinic, so this gives us time to get your prescriptions ready.  Please let us know the name of the medication you are requesting a refill of.                                     Follow-ups after your visit        Your next 10 appointments already scheduled     Jul 31, 2017  3:00 PM CDT   (Arrive by 2:45 PM)   New Patient Visit with Rosemary Peacock PA-C   [facility] [place])    [address] [place] [id number]   [phone]            Aug 09, 2017 10:30 AM CDT   US RENAL COMPLETE with [facility]  ([facility] [place])     [address] [place] [id number] [phone]           Please bring a list of your medicines (including vitamins, minerals and over-the-counter drugs). Also, tell your doctor about any allergies you may have. Wear comfortable clothes and leave your valuables at home.  You do not need to do anything special to prepare for your exam.  Please call the Imaging Department at your exam site with any questions.            Aug 09, 2017 11:30 AM CDT   (Arrive by 11:15 AM)   Urodynamics with Ashley Simeon PA-C   [facility] Prostate and Urologic Cancers ([facility])    [address] [place] [id number] [phone]            Aug 21, 2017  9:30 AM CDT   (Arrive by 9:15 AM)   Return Visit with Usman Em MD   [facility] for Prostate and Urologic Cancers ([facility])    [address] [place] [id number] [phone]              Who to contact     Please call your clinic at [phone] to:    Ask questions about your health    Make or cancel appointments    Discuss your medicines    Learn about your test results    Speak to your doctor   If you have compliments or concerns about an experience at your clinic, or if you wish to file a complaint, please contact [facility] Patient Relations at [phone] or email us at [email or web address]         Additional Information About Your Visit        [employer] Information     [employer] is an electronic gateway that provides easy, online access to your medical records. With [employer], you can request a clinic appointment, read your test results, renew a prescription or communicate with your care team.     To sign up for [employer] visit the website at [email or web address]   You will be asked to enter the access code listed below, as well as some personal information.  "Please follow the directions to create your username and password.     Your access code is: [email or web address]  Expires: 2017  2:00 PM     Your access code will  in 90 days. If you need help or a new code, please contact your [facility] or call [phone] for assistance.        Care EveryWhere ID     This is your Care EveryWhere ID. This could be used by other organizations to access your [employer] medical records  [id number]        Your Vitals Were     Pulse Height Pulse Oximetry BMI (Body Mass Index)          50 1.753 m (5' 9\") 100% 26.86 kg/m2         Blood Pressure from Last 3 Encounters:   17 134/66   17 152/73   17 149/68    Weight from Last 3 Encounters:   17 82.5 kg (181 lb 14.4 oz)   17 78.5 kg (173 lb)   17 81.2 kg (179 lb)              Today, you had the following     No orders found for display         Today's Medication Changes          These changes are accurate as of: 17 11:29 AM.  If you have any questions, ask your nurse or doctor.               Start taking these medicines.        Dose/Directions    meloxicam 7.5 MG tablet   Commonly known as:  MOBIC   Used for:  Facet arthropathy   Started by:  Fransisco Estrada MD        Dose:  7.5 mg   Take 1 tablet (7.5 mg) by mouth daily   Quantity:  30 tablet   Refills:  1            Where to get your medicines      [place] [facility] [place] [address], [place] [address]     Phone:  [phone]     meloxicam 7.5 MG tablet                Primary Care Provider Office Phone # Fax #    Carolynn Sariah Reardon [phone]       [address] [place] [address]        Equal Access to Services     JOSSELIN JENNINGS AH: Carlitos Guy, waaxda luqadaha, qaybta griceldaalanahy infante, dickson leyva ah. [facility] [phone].    ATENCIÓN: " Si habla jena, tiene a julio disposición servicios gratuitos de asistencia lingüística. Ha shirley [phone].    We comply with applicable federal civil rights laws and [place] laws. We do not discriminate on the basis of race, color, national origin, age, disability sex, sexual orientation or gender identity.            Thank you!     Thank you for choosing [facility] COMPREHENSIVE PAIN MANAGEMENT  for your care. Our goal is always to provide you with excellent care. Hearing back from our patients is one way we can continue to improve our services. Please take a few minutes to complete the written survey that you may receive in the mail after your visit with us. Thank you!             Your Updated Medication List - Protect others around you: Learn how to safely use, store and throw away your medicines at www.disposemymeds.org.          This list is accurate as of: 7/21/17 11:29 AM.  Always use your most recent med list.                   Brand Name Dispense Instructions for use Diagnosis    aspirin 81 MG EC tablet     90 tablet    Take 1 tablet (81 mg) by mouth daily    Type 2 diabetes mellitus with diabetic polyneuropathy, with long-term current use of insulin (H)       lisinopril 10 MG tablet    PRINIVIL/ZESTRIL    90 tablet    Take 1 tablet (10 mg) by mouth daily    Type 2 diabetes mellitus with diabetic polyneuropathy, with long-term current use of insulin (H)       losartan 50 MG tablet    COZAAR     Take 50 mg by mouth daily        meloxicam 7.5 MG tablet    MOBIC    30 tablet    Take 1 tablet (7.5 mg) by mouth daily    Facet arthropathy       metFORMIN 500 MG 24 hr tablet    GLUCOPHAGE-XR     Take 2,000 mg by mouth daily (with dinner)        nicotine 21 MG/24HR 24 hr patch    NICODERM CQ    30 patch    Place 1 patch onto the skin every 24 hours    Cigarette nicotine dependence without complication       nitroFURantoin (macrocrystal-monohydrate) 100 MG capsule    MACROBID    14 capsule    Take 1  capsule (100 mg) by mouth 2 times daily    Dysuria       pregabalin 100 MG capsule    LYRICA    90 capsule    Take 1 capsule (100 mg) by mouth 3 times daily    Type 2 diabetes mellitus with diabetic polyneuropathy, with long-term current use of insulin (H), Spinal stenosis, unspecified spinal region, Chronic back pain, unspecified back location, unspecified back pain laterality       propranolol 20 MG tablet    INDERAL     Take 20 mg by mouth 2 times daily        simvastatin 10 MG tablet    ZOCOR    90 tablet    Take 1 tablet (10 mg) by mouth At Bedtime    Type 2 diabetes mellitus with diabetic polyneuropathy, with long-term current use of insulin (H)       XIFAXAN 550 MG Tabs tablet   Generic drug:  rifaximin      Take 200 mg by mouth 2 times daily
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
## 2017-07-21 NOTE — PATIENT INSTRUCTIONS
1. Prescription for Mobic sent to Pharmacy. Take 7.5 mg daily.     2. Schedule your Bilateral Lumbar Medial Branch Block     Please call Clotilde at [phone] to schedule your procedure. She is available Monday - Friday from 9-5:30pm, if she is unavailable to take your call, please leave her a voice message with your name, birth date, and phone number and she will call you back.    Your procedure: Bilateral Lumbar Medial Branch Block    On the day of the procedure  1. Arrive 1 hour earlier than your scheduled time, to the [facility]  Address: [address] [place] [id number]  2. Check in on the 5th floor for your procedure    A nurse will call you 1 hour after your procedure to follow up.    If you must reschedule your procedure more than two times, you must follow up in clinic before rescheduling again.      Patient Pre-Procedure Instructions    CAUTION - FAILURE TO FOLLOW THESE PRE-PROCEDURE INSTRUCTIONS WILL RESULT IN YOUR PROCEDURE BEING RESCHEDULED.            You MUST have a  TO TAKE YOU HOME after your procedure. Transportation by Taxi or Para-transit must have a responsible adult accompany you home (other than the ). Travel by bus or light rail is not acceptable transportation. You must provide your 's full name and contact number at time of check in.   Fasting Protocol You may have NOTHING SOLID TO EAT FOR 8 HOURS prior to arrival at the procedure area.   Broth and candy are considered solid food and require an eight hour fast.   You may have CLEAR LIQUIDS UP TO 2 HOURS prior to arrival at the clinic.   Clear liquids include water, clear fruit juice (no pulp) carbonated beverages, ice, black coffee, black tea (no milk or cream), chewing gum (un-swallowed), and/or clear jello (no fruit or milk). No alcohol containing beverages.   Medications If you take any medications,  DO NOT STOP. Take your medications as usual the morning of your procedure with a  sip of water AT LEAST 2 HOURS PRIOR TO ARRIVAL.    Antibiotics If you are currently taking antibiotics, you must complete the entire dose 7 days prior to your scheduled procedure. You must be clear of any signs or symptoms of infection. If you begin antibiotics, please contact our clinic for instructions.   Fever, Chills, or Rash If you experience a fever of higher than 100 degrees, chills, rash, or open wounds during the one week prior to your procedure, please call the clinic.         Medication Hold List  **Patients under Cardiology/Neurology care should consult their provider prior to the pain procedure to verify pre-procedure medication instructions. The information below contains general guidelines.**    Blood Thinners If you are taking daily ASPIRIN, PLAVIX, OR OTHER BLOOD THINNERS SUCH AS COUMADIN/WARFARIN, we will need your prescribing doctor to sign a release permitting you to stop these medications. Once approved by your prescribing doctor - STOP ALL BLOOD THINNERS BASED ON THE TIME TABLE BELOW PRIOR TO YOUR PROCEDURE. If you have been instructed to stop WARFARIN(COUMADIN), you must have an INR lab drawn the day before your procedure. . Your INR must be within normal limits before we can perform your injection. MEDICATIONS CAN BE RESTARTED AFTER YOUR PROCEDURE.    14 DAY HOLD  Ticlid (ticlopidine)    10 DAY HOLD  Effient (Prasugel)    3 DAY HOLD  Xarelto (rivaroxaban) 7 DAY HOLD  Anacin, Bufferin, Ecotrin, Excedrin, Aggrenox (Aspirin)  Brilinta (ticagrelor)  Coumadin (Warfarin)  Pradexa (Dabigatran)  Elmiron (Pentosan)  Plavix (Clopidogrel Bisulfate)  Pletal (Cilostazol)    24 DAY HOLD  Lovenox (enoxaparin)  Agrylin (Anagrelide)        Non-steroidal Anti-inflammatories (NSAIDs) DO NOT TAKE any non-steroidal anti-inflammatory medications (NSAIDs) listed on the table below. MEDICATIONS CAN BE RESTARTED AFTER YOUR PROCEDURE. Celebrex is OK to take and does not need to be discontinued.     Medications to  stop:  3 DAY HOLD  Advil, Motrin (Ibuprofen)  Arthrotec (diciofenac sodium/misoprostol)  Clinoril (Sulindac)  Indocin (Indomethacin)  Lodine (Etodolac)  Toradol (Ketorolac)  Vicoprofen (Hydrocodone and Ibuprofen)  Voltaren (Diclotenac)    14 DAY HOLD  Daypro (Oxaprozin)  Feldene (Piroxicam)   7 DAY HOLD  Aleve (Naproxen sodium)  Darvon compound (contains aspirin)  Naprosyn (Naproxen)  Norgesic Forte (contains aspirin)  Mobic (Meloxicam)  Oruvall (Ketoprofen)  Percodan (contains aspirin)  Relafen (Nabumetone)  Salsalate  Trilisate  Vitamin E (more than 400 mg per day)  Any medication containing aspirin                To speak with a nurse, schedule/reschedule/cancel a clinic appointment, or request a medication refill call: [phone]     You can also reach us by Filement: [email or web address]      For refills, please call on Monday, 1 week before your medication runs out. The doctors are not always in clinic, so this gives us time to get your prescriptions ready.  Please let us know the name of the medication you are requesting a refill of.

## 2017-07-22 ASSESSMENT — ANXIETY QUESTIONNAIRES: GAD7 TOTAL SCORE: 14

## 2017-07-22 ASSESSMENT — PATIENT HEALTH QUESTIONNAIRE - PHQ9: SUM OF ALL RESPONSES TO PHQ QUESTIONS 1-9: 18

## 2017-07-31 ENCOUNTER — PRE VISIT (OUTPATIENT)
Dept: UROLOGY | Facility: CLINIC | Age: 67
End: 2017-07-31

## 2017-08-07 ENCOUNTER — MEDICAL CORRESPONDENCE (OUTPATIENT)
Dept: HEALTH INFORMATION MANAGEMENT | Facility: CLINIC | Age: 67
End: 2017-08-07

## 2017-08-09 ENCOUNTER — OFFICE VISIT (OUTPATIENT)
Dept: UROLOGY | Facility: CLINIC | Age: 67
End: 2017-08-09

## 2017-08-09 VITALS
HEART RATE: 51 BPM | SYSTOLIC BLOOD PRESSURE: 143 MMHG | WEIGHT: 175 LBS | BODY MASS INDEX: 25.92 KG/M2 | HEIGHT: 69 IN | DIASTOLIC BLOOD PRESSURE: 73 MMHG

## 2017-08-09 DIAGNOSIS — N39.490 OVERFLOW INCONTINENCE: ICD-10-CM

## 2017-08-09 DIAGNOSIS — R33.9 INCOMPLETE EMPTYING OF BLADDER: ICD-10-CM

## 2017-08-09 DIAGNOSIS — N31.9 NEUROGENIC BLADDER: Primary | ICD-10-CM

## 2017-08-09 LAB
APPEARANCE UR: CLEAR
BILIRUB UR QL: NORMAL
COLOR UR: YELLOW
GLUCOSE URINE: NORMAL MG/DL
HGB UR QL: NORMAL
KETONES UR QL: NORMAL MG/DL
LEUKOCYTE ESTERASE URINE: NORMAL
NITRITE UR QL STRIP: NORMAL
PH UR STRIP: 5 PH (ref 5–7)
PROTEIN ALBUMIN URINE: 100 MG/DL
SOURCE: NORMAL
SP GR UR STRIP: 1.01 (ref 1–1.03)
UROBILINOGEN UR QL STRIP: 0.2 EU/DL (ref 0.2–1)

## 2017-08-09 RX ORDER — LANCETS
EACH MISCELLANEOUS
COMMUNITY
Start: 2016-09-08 | End: 2020-10-29

## 2017-08-09 ASSESSMENT — PAIN SCALES - GENERAL: PAINLEVEL: NO PAIN (0)
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
## 2017-08-09 NOTE — MR AVS SNAPSHOT
After Visit Summary   8/9/2017    Javi Celaya    MRN: [id number]           Patient Information     Date Of Birth          1950        Visit Information        Provider Department      8/9/2017 11:30 AM Ashley Simeon PA-C [facility] Urology and [facility] for Prostate and Urologic Cancers        Today's Diagnoses     Neurogenic bladder    -  1    Incomplete emptying of bladder        Overflow incontinence           Follow-ups after your visit        Your next 10 appointments already scheduled     Aug 21, 2017  9:30 AM CDT   (Arrive by 9:15 AM)   Return Visit with Usman Em MD   [facility] Urology and [facility] for Prostate and Urologic Cancers ([facility])    [address] [place] [address]  [facility] [address] [phone]            Aug 30, 2017   Procedure with Fransisco Estrada MD   [facility] ([facility])    [address] [place] [address]  [facility] [address] [phone]           Located in the Clinics and Surgery Center at [address] [place] [address].   parking is very convenient and highly recommended.  is a $6 flat rate fee.  Both  and self parkers should enter the main arrival plaza from [facility]; parking attendants will direct you based on your parking preference.              Who to contact     Please call your clinic at [phone] to:    Ask questions about your health    Make or cancel appointments    Discuss your medicines    Learn about your test results    Speak to your doctor   If you have compliments or concerns about an experience at your clinic, or if you wish to file a complaint, please contact [facility] Patient Relations at [phone] or email us at [email or web address]         Additional Information About Your Visit        Team Everesthart Information     [employer] is an electronic gateway that provides easy, online  "access to your medical records. With Brainjuicer, you can request a clinic appointment, read your test results, renew a prescription or communicate with your care team.     To sign up for Brainjuicer visit the website at [email or web address]   You will be asked to enter the access code listed below, as well as some personal information. Please follow the directions to create your username and password.     Your access code is: [email or web address]  Expires: 10/24/2017  6:30 AM     Your access code will  in 90 days. If you need help or a new code, please contact your [facility] or call [phone] for assistance.        Care EveryWhere ID     This is your Care EveryWhere ID. This could be used by other organizations to access your [place] medical records  [id number]        Your Vitals Were     Pulse Height BMI (Body Mass Index)             51 1.753 m (5' 9\") 25.84 kg/m2          Blood Pressure from Last 3 Encounters:   17 143/73   17 134/66   17 152/73    Weight from Last 3 Encounters:   17 79.4 kg (175 lb)   17 82.5 kg (181 lb 14.4 oz)   17 78.5 kg (173 lb)              We Performed the Following     COMPLEX UROFLOWMETRY     CYSTOMETROGRAM COMPLEX W VOIDING PRESS PROFILE     EMG ANAL/URETH SPHINCTER, OTHER THAN NEEDLE     HC CONTRAST ISOVUE 370 MG/ML, PER ML     Urinalysis chemical screen POCT     VOIDING PRESSURE STUDY, INTRA-ABDOMINAL     X-Ray Voiding cystogram        Primary Care Provider Office Phone # Fax #    Carolynn Sariah Reardon [phone]       [address] [place] [address] [address] [place] [id number]        Equal Access to Services     JOSSELIN JENNINGS : Carlitos Guy, guerrero thayer, francoise infante, dickson baron. So [facility] [place] [phone].    ATENCIÓN: Si habla español, tiene a julio disposición servicios gratuitos de asistencia lingüística. Llame al [phone].    We comply " with applicable federal civil rights laws and [place] laws. We do not discriminate on the basis of race, color, national origin, age, disability sex, sexual orientation or gender identity.            Thank you!     Thank you for choosing [facility] UROLOGY AND [facility] FOR PROSTATE AND UROLOGIC CANCERS  for your care. Our goal is always to provide you with excellent care. Hearing back from our patients is one way we can continue to improve our services. Please take a few minutes to complete the written survey that you may receive in the mail after your visit with us. Thank you!             Your Updated Medication List - Protect others around you: Learn how to safely use, store and throw away your medicines at www.disposemymeds.org.          This list is accurate as of: 8/9/17  1:14 PM.  Always use your most recent med list.                   Brand Name Dispense Instructions for use Diagnosis    aspirin 81 MG EC tablet     90 tablet    Take 1 tablet (81 mg) by mouth daily    Type 2 diabetes mellitus with diabetic polyneuropathy, with long-term current use of insulin (H)       RAMIREZ CONTOUR NEXT test strip   Generic drug:  blood glucose monitoring      Use  to test three times a day. Use as directed. Pharmacy dispense brand based on insurance.        insulin glargine 100 UNIT/ML injection    LANTUS     Inject 25 units at 10pm daily (next refill)        lisinopril 10 MG tablet    PRINIVIL/ZESTRIL    90 tablet    Take 1 tablet (10 mg) by mouth daily    Type 2 diabetes mellitus with diabetic polyneuropathy, with long-term current use of insulin (H)       losartan 50 MG tablet    COZAAR     Take 50 mg by mouth daily        meloxicam 7.5 MG tablet    MOBIC    30 tablet    Take 1 tablet (7.5 mg) by mouth daily    Facet arthropathy       metFORMIN 500 MG 24 hr tablet    GLUCOPHAGE-XR     Take 2,000 mg by mouth daily (with dinner)        MICROLET LANCETS Misc      three times a day.        nicotine 21 MG/24HR 24 hr patch     NICODERM CQ    30 patch    Place 1 patch onto the skin every 24 hours    Cigarette nicotine dependence without complication       nitroFURantoin (macrocrystal-monohydrate) 100 MG capsule    MACROBID    14 capsule    Take 1 capsule (100 mg) by mouth 2 times daily    Dysuria       pregabalin 100 MG capsule    LYRICA    90 capsule    Take 1 capsule (100 mg) by mouth 3 times daily    Type 2 diabetes mellitus with diabetic polyneuropathy, with long-term current use of insulin (H), Spinal stenosis, unspecified spinal region, Chronic back pain, unspecified back location, unspecified back pain laterality       propranolol 20 MG tablet    INDERAL     Take 20 mg by mouth 2 times daily        psyllium 58.6 % Powd    METAMUCIL          simvastatin 10 MG tablet    ZOCOR    90 tablet    Take 1 tablet (10 mg) by mouth At Bedtime    Type 2 diabetes mellitus with diabetic polyneuropathy, with long-term current use of insulin (H)       XIFAXAN 550 MG Tabs tablet   Generic drug:  rifaximin      Take 200 mg by mouth 2 times daily

## 2017-08-16 ENCOUNTER — PRE VISIT (OUTPATIENT)
Dept: UROLOGY | Facility: CLINIC | Age: 67
End: 2017-08-16

## 2017-08-16 DIAGNOSIS — N31.9 NEUROGENIC BLADDER: Primary | ICD-10-CM

## 2017-08-16 NOTE — TELEPHONE ENCOUNTER
Pt with a neurogenic bladder coming in to review his UDS, renal US and labs.    Lab appointment scheduled.

## 2017-08-30 ENCOUNTER — SURGERY (OUTPATIENT)
Age: 67
End: 2017-08-30

## 2017-08-30 ENCOUNTER — HOSPITAL ENCOUNTER (OUTPATIENT)
Facility: AMBULATORY SURGERY CENTER | Age: 67
End: 2017-08-30

## 2017-08-30 VITALS
SYSTOLIC BLOOD PRESSURE: 154 MMHG | HEART RATE: 58 BPM | WEIGHT: 173 LBS | TEMPERATURE: 97.4 F | BODY MASS INDEX: 25.62 KG/M2 | RESPIRATION RATE: 20 BRPM | HEIGHT: 69 IN | DIASTOLIC BLOOD PRESSURE: 74 MMHG | OXYGEN SATURATION: 98 %

## 2017-08-30 LAB — GLUCOSE BLDC GLUCOMTR-MCNC: 79 MG/DL (ref 70–99)

## 2017-08-30 RX ORDER — LIDOCAINE HYDROCHLORIDE 10 MG/ML
INJECTION, SOLUTION EPIDURAL; INFILTRATION; INTRACAUDAL; PERINEURAL PRN
Status: DISCONTINUED | OUTPATIENT
Start: 2017-08-30 | End: 2017-08-30 | Stop reason: HOSPADM

## 2017-08-30 RX ORDER — BUPIVACAINE HYDROCHLORIDE 2.5 MG/ML
INJECTION, SOLUTION EPIDURAL; INFILTRATION; INTRACAUDAL PRN
Status: DISCONTINUED | OUTPATIENT
Start: 2017-08-30 | End: 2017-08-30 | Stop reason: HOSPADM

## 2017-08-30 RX ADMIN — BUPIVACAINE HYDROCHLORIDE 3 ML: 2.5 INJECTION, SOLUTION EPIDURAL; INFILTRATION; INTRACAUDAL at 10:34

## 2017-08-30 RX ADMIN — LIDOCAINE HYDROCHLORIDE 8 ML: 10 INJECTION, SOLUTION EPIDURAL; INFILTRATION; INTRACAUDAL; PERINEURAL at 10:34

## 2017-08-30 NOTE — IP AVS SNAPSHOT
[facility]    [address] [place] [address]    Phone:  [phone]    Fax:  [phone]                                       After Visit Summary   8/30/2017    Javi Celaya    MRN: [id number]           After Visit Summary Signature Page     I have received my discharge instructions, and my questions have been answered. I have discussed any challenges I see with this plan with the nurse or doctor.    ..........................................................................................................................................  Patient/Patient Representative Signature      ..........................................................................................................................................  Patient Representative Print Name and Relationship to Patient    ..................................................               ................................................  Date                                            Time    ..........................................................................................................................................  Reviewed by Signature/Title    ...................................................              ..............................................  Date                                                            Time

## 2017-08-30 NOTE — IP AVS SNAPSHOT
MRN:[id number]                      After Visit Summary   8/30/2017    Javi Celaya    MRN: [id number]           Thank you!     Thank you for choosing [facility] for your care. Our goal is always to provide you with excellent care. Hearing back from our patients is one way we can continue to improve our services. Please take a few minutes to complete the written survey that you may receive in the mail after you visit with us. Thank you!        Patient Information     Date Of Birth          1950        About your hospital stay     You were admitted on:  August 30, 2017 [facility]    You were discharged on:  August 30, 2017       Who to Call     For medical emergencies, please call 911.  For non-urgent questions about your medical care, please call your primary care provider or clinic, [phone]  For questions related to your surgery, please call your surgery clinic        Attending Provider     Provider Specialty    Fransisco Estrada MD Anesthesiology       Primary Care Provider Office Phone # Fax #    Carolynn Sariah Reardon [phone]      Further instructions from your care team       Home Care Instructions after a Medial Branch Block      In a medial branch block, a local anesthetic (numbing medicine) is injected near the medial branch nerve. This stops the transmission of pain signals from the facet joint. If this reduces your pain and helps you move  normal, it may tell the doctor which facet joint is causing the pain. This procedure is a diagnostic procedure and is typically short lasting. With this injection a steroid to increase the longevity of the blocks effect may or may not be used.    Activity  -You may resume most normal activity levels with the exception of strenuous activity. It is important for us to know if your pain with normal activity is relieved after this injection.  -DO NOT shower for 24  hours  -DO NOT remove bandaid for 24 hours    Pain  -You may experience soreness at the injection site for one or two days  -You may use an ice pack for 20 minutes every 2 hours for the first 24 hours  -You may use a heating pad after the first 24 hours  -You may use Tylenol (acetaminophen) every 4 hours or other pain medicines as     directed by your physician    You may experience numbness radiating into your legs or arms (depending on the procedure location). This numbness may last several hours. Until sensation returns to normal; please use caution in walking, climbing stairs, and stepping out of your vehicle, etc.    DID YOU RECEIVE SEDATION TODAY?  No    If you received sedation please follow these additional safety measures.  Sedation medicine, if given, may remain active for many hours. It is important for the next 24 hours that you do not:  -Drive a car  -Operate machines or power tools  -Consume alcohol, including beer  -Sign any important papers or legal documents    DID YOU RECEIVE STEROIDS TODAY?  No    Common side effects of steroids:  Not everyone will experience corticosteroid side effects. If side effects are experienced, they will gradually subside in the 7-10 day period following an injection. Most common side effects include:  -Flushed face and/or chest  -Feeling of warmth, particularly in the face but could be an overall feeling of warmth  -Increased blood sugar in diabetic patients  -Menstrual irregularities my occur. If taking hormone-based birth control an alternate method of birth control is recommended  -Sleep disturbances and/or mood swings are possible  -Leg cramps      PLEASE KEEP TRACK OF YOUR SYMPTOMS AND NOTE YOUR IMPROVEMENT FOR YOUR DOCTOR.     Please contact us if you have:  -Severe pain  -Fever more than 101.5 degrees Fahrenheit  -Signs of infection at the injection site (redness, swelling, or drainage)    If you have questions, please contact our office at [phone] between  "the hours of 7:00 am and 3:00 pm Monday through Friday. After office hours you can contact the on call provider by dialing [phone]. If you need immediate attention, we recommend that you go to a hospital emergency room or dial 911.      Pending Results     Date and Time Order Name Status Description    2017 1009 XR SURGERY YEE FLUORO LESS THAN 5 MIN W STILLS In process             Admission Information     Date & Time Provider Department Dept. Phone    2017 Fransisco Estrada MD [facility] [phone]      Your Vitals Were     Blood Pressure Pulse Temperature Respirations Height Weight    163/76 52 97.4  F (36.3  C) (Temporal) 28 1.753 m (5' 9\") 78.5 kg (173 lb)    Pulse Oximetry BMI (Body Mass Index)                100% 25.55 kg/m2          [employer] Information     "[employer] is an electronic gateway that provides easy, online access to your medical records. With "[employer] you can request a clinic appointment, read your test results, renew a prescription or communicate with your care team.     To sign up for "[employer] visit the website at [email or web address]   You will be asked to enter the access code listed below, as well as some personal information. Please follow the directions to create your username and password.     Your access code is: [email or web address]  Expires: 10/24/2017  6:30 AM     Your access code will  in 90 days. If you need help or a new code, please contact your [facility] or call [phone] for assistance.        Care EveryWhere ID     This is your Care EveryWhere ID. This could be used by other organizations to access your [facility] medical records  [id number]        Equal Access to Services     JOSSELIN JENNINGS AH: Carlitos Guy, wavladimir thayer, qaybta kaalmamaynor infante, dickson baron. So [facility] [phone].    ATENCIÓN: Si habla español, tiene a julio disposición servicios " bright de asistencia lingüística. Ha shirley [phone].    We comply with applicable federal civil rights laws and [place] laws. We do not discriminate on the basis of race, color, national origin, age, disability sex, sexual orientation or gender identity.               Review of your medicines      UNREVIEWED medicines. Ask your doctor about these medicines        Dose / Directions    aspirin 81 MG EC tablet   Used for:  Type 2 diabetes mellitus with diabetic polyneuropathy, with long-term current use of insulin (H)        Dose:  81 mg   Take 1 tablet (81 mg) by mouth daily   Quantity:  90 tablet   Refills:  3       insulin glargine 100 UNIT/ML injection   Commonly known as:  LANTUS        Inject 25 units at 10pm daily (next refill)   Refills:  0       lisinopril 10 MG tablet   Commonly known as:  PRINIVIL/ZESTRIL   Used for:  Type 2 diabetes mellitus with diabetic polyneuropathy, with long-term current use of insulin (H)        Dose:  10 mg   Take 1 tablet (10 mg) by mouth daily   Quantity:  90 tablet   Refills:  3       losartan 50 MG tablet   Commonly known as:  COZAAR        Dose:  50 mg   Take 50 mg by mouth daily   Refills:  0       meloxicam 7.5 MG tablet   Commonly known as:  MOBIC   Used for:  Facet arthropathy        Dose:  7.5 mg   Take 1 tablet (7.5 mg) by mouth daily   Quantity:  30 tablet   Refills:  1       metFORMIN 500 MG 24 hr tablet   Commonly known as:  GLUCOPHAGE-XR        Dose:  2000 mg   Take 2,000 mg by mouth daily (with dinner)   Refills:  0       nicotine 21 MG/24HR 24 hr patch   Commonly known as:  NICODERM CQ   Used for:  Cigarette nicotine dependence without complication        Dose:  1 patch   Place 1 patch onto the skin every 24 hours   Quantity:  30 patch   Refills:  3       nitroFURantoin (macrocrystal-monohydrate) 100 MG capsule   Commonly known as:  MACROBID   Used for:  Dysuria        Dose:  100 mg   Take 1 capsule (100 mg) by mouth 2 times daily   Quantity:  14 capsule    Refills:  0       pregabalin 100 MG capsule   Commonly known as:  LYRICA   Used for:  Type 2 diabetes mellitus with diabetic polyneuropathy, with long-term current use of insulin (H), Spinal stenosis, unspecified spinal region, Chronic back pain, unspecified back location, unspecified back pain laterality        Dose:  100 mg   Take 1 capsule (100 mg) by mouth 3 times daily   Quantity:  90 capsule   Refills:  1       propranolol 20 MG tablet   Commonly known as:  INDERAL        Dose:  20 mg   Take 20 mg by mouth 2 times daily   Refills:  0       psyllium 58.6 % Powd   Commonly known as:  METAMUCIL        Refills:  0       simvastatin 10 MG tablet   Commonly known as:  ZOCOR   Used for:  Type 2 diabetes mellitus with diabetic polyneuropathy, with long-term current use of insulin (H)        Dose:  10 mg   Take 1 tablet (10 mg) by mouth At Bedtime   Quantity:  90 tablet   Refills:  1       XIFAXAN 550 MG Tabs tablet   Generic drug:  rifaximin        Dose:  200 mg   Take 200 mg by mouth 2 times daily   Refills:  0         CONTINUE these medicines which have NOT CHANGED        Dose / Directions    RAMIREZ CONTOUR NEXT test strip   Generic drug:  blood glucose monitoring        Use  to test three times a day. Use as directed. Pharmacy dispense brand based on insurance.   Refills:  0       MICROLET LANCETS Misc        three times a day.   Refills:  0                Protect others around you: Learn how to safely use, store and throw away your medicines at www.disposemymeds.org.             Medication List: This is a list of all your medications and when to take them. Check marks below indicate your daily home schedule. Keep this list as a reference.      Medications           Morning Afternoon Evening Bedtime As Needed    aspirin 81 MG EC tablet   Take 1 tablet (81 mg) by mouth daily                                RAMIREZ CONTOUR NEXT test strip   Use  to test three times a day. Use as directed. Pharmacy dispense brand based  on insurance.   Generic drug:  blood glucose monitoring                                insulin glargine 100 UNIT/ML injection   Commonly known as:  LANTUS   Inject 25 units at 10pm daily (next refill)                                lisinopril 10 MG tablet   Commonly known as:  PRINIVIL/ZESTRIL   Take 1 tablet (10 mg) by mouth daily                                losartan 50 MG tablet   Commonly known as:  COZAAR   Take 50 mg by mouth daily                                meloxicam 7.5 MG tablet   Commonly known as:  MOBIC   Take 1 tablet (7.5 mg) by mouth daily                                metFORMIN 500 MG 24 hr tablet   Commonly known as:  GLUCOPHAGE-XR   Take 2,000 mg by mouth daily (with dinner)                                MICROLET LANCETS Misc   three times a day.                                nicotine 21 MG/24HR 24 hr patch   Commonly known as:  NICODERM CQ   Place 1 patch onto the skin every 24 hours                                nitroFURantoin (macrocrystal-monohydrate) 100 MG capsule   Commonly known as:  MACROBID   Take 1 capsule (100 mg) by mouth 2 times daily                                pregabalin 100 MG capsule   Commonly known as:  LYRICA   Take 1 capsule (100 mg) by mouth 3 times daily                                propranolol 20 MG tablet   Commonly known as:  INDERAL   Take 20 mg by mouth 2 times daily                                psyllium 58.6 % Powd   Commonly known as:  METAMUCIL                                simvastatin 10 MG tablet   Commonly known as:  ZOCOR   Take 1 tablet (10 mg) by mouth At Bedtime                                XIFAXAN 550 MG Tabs tablet   Take 200 mg by mouth 2 times daily   Generic drug:  rifaximin

## 2017-08-30 NOTE — DISCHARGE INSTRUCTIONS
Home Care Instructions after a Medial Branch Block      In a medial branch block, a local anesthetic (numbing medicine) is injected near the medial branch nerve. This stops the transmission of pain signals from the facet joint. If this reduces your pain and helps you move  normal, it may tell the doctor which facet joint is causing the pain. This procedure is a diagnostic procedure and is typically short lasting. With this injection a steroid to increase the longevity of the blocks effect may or may not be used.    Activity  -You may resume most normal activity levels with the exception of strenuous activity. It is important for us to know if your pain with normal activity is relieved after this injection.  -DO NOT shower for 24 hours  -DO NOT remove bandaid for 24 hours    Pain  -You may experience soreness at the injection site for one or two days  -You may use an ice pack for 20 minutes every 2 hours for the first 24 hours  -You may use a heating pad after the first 24 hours  -You may use Tylenol (acetaminophen) every 4 hours or other pain medicines as     directed by your physician    You may experience numbness radiating into your legs or arms (depending on the procedure location). This numbness may last several hours. Until sensation returns to normal; please use caution in walking, climbing stairs, and stepping out of your vehicle, etc.    DID YOU RECEIVE SEDATION TODAY?  No    If you received sedation please follow these additional safety measures.  Sedation medicine, if given, may remain active for many hours. It is important for the next 24 hours that you do not:  -Drive a car  -Operate machines or power tools  -Consume alcohol, including beer  -Sign any important papers or legal documents    DID YOU RECEIVE STEROIDS TODAY?  No    Common side effects of steroids:  Not everyone will experience corticosteroid side effects. If side effects are experienced, they will gradually subside in the 7-10 day period  following an injection. Most common side effects include:  -Flushed face and/or chest  -Feeling of warmth, particularly in the face but could be an overall feeling of warmth  -Increased blood sugar in diabetic patients  -Menstrual irregularities my occur. If taking hormone-based birth control an alternate method of birth control is recommended  -Sleep disturbances and/or mood swings are possible  -Leg cramps      PLEASE KEEP TRACK OF YOUR SYMPTOMS AND NOTE YOUR IMPROVEMENT FOR YOUR DOCTOR.     Please contact us if you have:  -Severe pain  -Fever more than 101.5 degrees Fahrenheit  -Signs of infection at the injection site (redness, swelling, or drainage)    If you have questions, please contact our office at [phone] between the hours of 7:00 am and 3:00 pm Monday through Friday. After office hours you can contact the on call provider by dialing [phone]. If you need immediate attention, we recommend that you go to a hospital emergency room or dial [phone].

## 2017-09-08 ENCOUNTER — CARE COORDINATION (OUTPATIENT)
Dept: ANESTHESIOLOGY | Facility: CLINIC | Age: 67
End: 2017-09-08

## 2017-09-08 DIAGNOSIS — M47.819 FACET ARTHROPATHY: Primary | ICD-10-CM

## 2017-09-08 NOTE — PROGRESS NOTES
Purpose of call: Follow up after bilateral lumbar medial branch nerve blocks performed 08/30/17  Spoke with: Javi    Location of pain: low back  Percentage of pain relief after procedure: 80%    Pain before injection: 10/10  Pain after injection: 2/10    Follow up: patient would like to proceed with a second nerve block

## 2017-09-12 NOTE — PROCEDURES
Diagnostic Medial Branch Block    The patient s identity, the procedure to be performed and the specific site of the procedure was verified in accordance with [facility] Protocol.  Diagnosis:Lumbar Facet Arthropathy   Pre-procedure Pain Score: 6/10           Procedure Note:  Informed consent was obtained.  The patient was positioned comfortably in the prone position.  There was no evidence of infection at the sites of needle insertion.  The patient was prepped and draped in a sterile fashion.  Skeletal landmarks were identified with the fluoroscope guidance.  22 gauge spinal needles were then placed at the junction of the superior articulating process and the transverse process for lumbar and thoracic levels and centrally on the lateral mass in the cervical region.  Medication was then injected after negative aspiration. The patient was then observed for 30 minutes.  No complications were noted.      Levels:Bilateral   L3/L4/ALA  Medication (per site): 0.5cc   0.25%  Bupivacaine    Post Procedure Pain Score: 0/10    The patient was given discharge instructions and verbalizes understanding, including understanding of those signs and symptoms that would require emergency care.     Plan:   The patient will fill out a pain diary for the remainder of the day and will either fax, email or bring it to the pain clinic.      Counseling: Greater than 50% of this patient visit was spent in counseling the patient regarding the treatment of their pain, coordinating their overall treatment plan and assessing their progress.

## 2017-09-19 DIAGNOSIS — E11.9 DIABETES MELLITUS, TYPE 2 (H): ICD-10-CM

## 2017-09-19 DIAGNOSIS — I10 HTN (HYPERTENSION): Primary | ICD-10-CM

## 2017-09-19 NOTE — TELEPHONE ENCOUNTER
losartan  Last Written Prescription Date: pt reported  Lantus   Pt reported as 25 units daily  Last Office Visit with [facility], [facility] or [facility] prescribing provider: 5/8/17  No Microalbumin found  Lab Results   Component Value Date    A1C 6.0 04/26/2017     Potassium   Date Value Ref Range Status   04/26/2017 4.6 3.4 - 5.3 mmol/L Final     Creatinine   Date Value Ref Range Status   04/26/2017 1.12 0.66 - 1.25 mg/dL Final     BP Readings from Last 3 Encounters:   08/30/17 154/74   08/09/17 143/73   07/21/17 134/66     Routed to clinic nurse. No mention of Lantus in clinic note. Both meds pt reported.

## 2017-09-20 RX ORDER — LOSARTAN POTASSIUM 50 MG/1
50 TABLET ORAL DAILY
Qty: 90 TABLET | Refills: 0 | Status: SHIPPED | OUTPATIENT
Start: 2017-09-20 | End: 2017-11-14

## 2017-10-06 ENCOUNTER — MEDICAL CORRESPONDENCE (OUTPATIENT)
Dept: HEALTH INFORMATION MANAGEMENT | Facility: CLINIC | Age: 67
End: 2017-10-06

## 2017-10-12 ENCOUNTER — SURGERY (OUTPATIENT)
Age: 67
End: 2017-10-12

## 2017-10-12 ENCOUNTER — HOSPITAL ENCOUNTER (OUTPATIENT)
Facility: AMBULATORY SURGERY CENTER | Age: 67
End: 2017-10-12

## 2017-10-12 ENCOUNTER — CARE COORDINATION (OUTPATIENT)
Dept: ANESTHESIOLOGY | Facility: CLINIC | Age: 67
End: 2017-10-12

## 2017-10-12 VITALS
TEMPERATURE: 97.5 F | WEIGHT: 173 LBS | HEART RATE: 51 BPM | HEIGHT: 69 IN | RESPIRATION RATE: 18 BRPM | BODY MASS INDEX: 25.62 KG/M2 | OXYGEN SATURATION: 100 % | DIASTOLIC BLOOD PRESSURE: 90 MMHG | SYSTOLIC BLOOD PRESSURE: 167 MMHG

## 2017-10-12 RX ORDER — LIDOCAINE HYDROCHLORIDE 10 MG/ML
INJECTION, SOLUTION EPIDURAL; INFILTRATION; INTRACAUDAL; PERINEURAL PRN
Status: DISCONTINUED | OUTPATIENT
Start: 2017-10-12 | End: 2017-10-12 | Stop reason: HOSPADM

## 2017-10-12 RX ORDER — BUPIVACAINE HYDROCHLORIDE 2.5 MG/ML
INJECTION, SOLUTION EPIDURAL; INFILTRATION; INTRACAUDAL PRN
Status: DISCONTINUED | OUTPATIENT
Start: 2017-10-12 | End: 2017-10-12 | Stop reason: HOSPADM

## 2017-10-12 RX ADMIN — LIDOCAINE HYDROCHLORIDE 5 ML: 10 INJECTION, SOLUTION EPIDURAL; INFILTRATION; INTRACAUDAL; PERINEURAL at 11:03

## 2017-10-12 RX ADMIN — BUPIVACAINE HYDROCHLORIDE 3 ML: 2.5 INJECTION, SOLUTION EPIDURAL; INFILTRATION; INTRACAUDAL at 11:03

## 2017-10-12 NOTE — PROGRESS NOTES
LPN called pt to follow up after procedure:    Procedure Performed: Bilateral Lumbar Medial Branch Blocks  Doctor: Natalie  Diagnosis: Facet arthropathy   Date of Procedure: 10/12/17   Patient stated their % of Overall Pain relief: 100%    Follow up: Clinic staff will complete the pt's PA for their RFA, and will contact pt when they are able to schedule.   Rose Shaver LPN

## 2017-10-12 NOTE — IP AVS SNAPSHOT
[facility]    [address] [place] [id number]    Phone:  [phone]    Fax:  [phone]                                       After Visit Summary   10/12/2017    Javi Celaya    MRN: [id number]           After Visit Summary Signature Page     I have received my discharge instructions, and my questions have been answered. I have discussed any challenges I see with this plan with the nurse or doctor.    ..........................................................................................................................................  Patient/Patient Representative Signature      ..........................................................................................................................................  Patient Representative Print Name and Relationship to Patient    ..................................................               ................................................  Date                                            Time    ..........................................................................................................................................  Reviewed by Signature/Title    ...................................................              ..............................................  Date                                                            Time

## 2017-10-12 NOTE — DISCHARGE INSTRUCTIONS
Home Care Instructions after a Medial Branch Block      In a medial branch block, a local anesthetic (numbing medicine) is injected near the medial branch nerve. This stops the transmission of pain signals from the facet joint. If this reduces your pain and improves your mobility, it may tell the doctor which facet joint is causing the pain. This procedure is a diagnostic procedure and is typically short lasting. With this injection, a steroid to increase the longevity of the blocks effect may or may not be used.    Activity  -You may resume most normal activity levels with the exception of strenuous activity. It is important for us to know if your pain with normal activity is relieved after this injection.  -DO NOT shower for 24 hours  -DO NOT remove bandaid for 24 hours    Pain  -You may experience soreness at the injection site for one or two days  -You may use an ice pack for 20 minutes every 2 hours for the first 24 hours  -You may use a heating pad after the first 24 hours  -You may use Tylenol (acetaminophen) every 4 hours or other pain medicines as     directed by your physician    You may experience numbness radiating into your legs or arms (depending on the procedure location). This numbness may last several hours. Until sensation returns to normal; please use caution in walking, climbing stairs, and stepping out of your vehicle, etc.    DID YOU RECEIVE SEDATION TODAY?  No        DID YOU RECEIVE STEROIDS TODAY?  No      PLEASE KEEP TRACK OF YOUR SYMPTOMS AND NOTE YOUR IMPROVEMENT FOR YOUR DOCTOR.     Please contact us if you have:  -Severe pain  -Fever more than 101.5 degrees Fahrenheit  -Signs of infection at the injection site (redness, swelling, or drainage)    If you have questions, please contact our office at [phone] between the hours of 7:00 am and 3:00 pm Monday through Friday. After office hours you can contact the on call provider by dialing [phone]. If you need immediate attention, we  recommend that you go to a hospital emergency room or dial 911.

## 2017-10-12 NOTE — IP AVS SNAPSHOT
MRN:[id number]                      After Visit Summary   10/12/2017    Javi Celaya    MRN: [id number]           Thank you!     Thank you for choosing [facility] for your care. Our goal is always to provide you with excellent care. Hearing back from our patients is one way we can continue to improve our services. Please take a few minutes to complete the written survey that you may receive in the mail after you visit with us. Thank you!        Patient Information     Date Of Birth          1950        About your hospital stay     You were admitted on:  October 12, 2017 You last received care in the:  [facility]    You were discharged on:  October 12, 2017       Who to Call     For medical emergencies, please call 911.  For non-urgent questions about your medical care, please call your primary care provider or clinic, [phone]  For questions related to your surgery, please call your surgery clinic        Attending Provider     Provider Specialty    Fransisco Estrada MD Anesthesiology       Primary Care Provider Office Phone # Fax #    Carolynn Reardon [phone]      Your next 10 appointments already scheduled     Oct 24, 2017  3:30 PM CDT   (Arrive by 3:15 PM)   Return Visit with Carolynn Reardon MD   [facility] ([facility])    [address] [place] [id number] [phone]            Nov 06, 2017  1:00 PM CST   (Arrive by 12:45 PM)   Return Visit with Usman Em MD   [facility] ([facility])    [address] [place] [id number] [phone]              Further instructions from your care team       Home Care Instructions after a Medial Branch Block      In a medial branch block, a local anesthetic (numbing medicine) is injected near the medial  branch nerve. This stops the transmission of pain signals from the facet joint. If this reduces your pain and improves your mobility, it may tell the doctor which facet joint is causing the pain. This procedure is a diagnostic procedure and is typically short lasting. With this injection, a steroid to increase the longevity of the blocks effect may or may not be used.    Activity  -You may resume most normal activity levels with the exception of strenuous activity. It is important for us to know if your pain with normal activity is relieved after this injection.  -DO NOT shower for 24 hours  -DO NOT remove bandaid for 24 hours    Pain  -You may experience soreness at the injection site for one or two days  -You may use an ice pack for 20 minutes every 2 hours for the first 24 hours  -You may use a heating pad after the first 24 hours  -You may use Tylenol (acetaminophen) every 4 hours or other pain medicines as     directed by your physician    You may experience numbness radiating into your legs or arms (depending on the procedure location). This numbness may last several hours. Until sensation returns to normal; please use caution in walking, climbing stairs, and stepping out of your vehicle, etc.    DID YOU RECEIVE SEDATION TODAY?  No        DID YOU RECEIVE STEROIDS TODAY?  No      PLEASE KEEP TRACK OF YOUR SYMPTOMS AND NOTE YOUR IMPROVEMENT FOR YOUR DOCTOR.     Please contact us if you have:  -Severe pain  -Fever more than 101.5 degrees Fahrenheit  -Signs of infection at the injection site (redness, swelling, or drainage)    If you have questions, please contact our office at [phone] between the hours of 7:00 am and 3:00 pm Monday through Friday. After office hours you can contact the on call provider by dialing [phone]. If you need immediate attention, we recommend that you go to a hospital emergency room or dial [phone].      Pending Results     No orders found from 10/10/2017 to 10/13/2017.           "  Admission Information     Date & Time Provider Department Dept. Phone    10/12/2017 Fransisco Estrada MD [facility] [phone]      Your Vitals Were     Blood Pressure Pulse Temperature Respirations Height Weight    190/86 51 97.5  F (36.4  C) (Temporal) 15 1.753 m (5' 9\") 78.5 kg (173 lb)    Pulse Oximetry BMI (Body Mass Index)                88% 25.55 kg/m2          [employer] Information     fundfindr is an electronic gateway that provides easy, online access to your medical records. With fundfindr, you can request a clinic appointment, read your test results, renew a prescription or communicate with your care team.     To sign up for fundfindr visit the website at [email or web address]   You will be asked to enter the access code listed below, as well as some personal information. Please follow the directions to create your username and password.     Your access code is: [email or web address]  Expires: 10/24/2017  6:30 AM     Your access code will  in 90 days. If you need help or a new code, please contact your [facility] or call [phone] for assistance.        Care EveryWhere ID     This is your Care EveryWhere ID. This could be used by other organizations to access your [facility] medical records  [id number]        Equal Access to Services     JOSSELIN JENNINGS AH: Hadii jake pyle hadasho Soomaali, waaxda luqadaha, qaybta kaalmada adeegyada, dickson baron. So [facility] [place] [phone].    ATENCIÓN: Si habla español, tiene a julio disposición servicios gratuitos de asistencia lingüística. Llame al [phone].    We comply with applicable federal civil rights laws and [place] laws. We do not discriminate on the basis of race, color, national origin, age, disability, sex, sexual orientation, or gender identity.               Review of your medicines      UNREVIEWED medicines. Ask your doctor about these medicines        Dose / Directions    " aspirin 81 MG EC tablet   Used for:  Type 2 diabetes mellitus with diabetic polyneuropathy, with long-term current use of insulin (H)        Dose:  81 mg   Take 1 tablet (81 mg) by mouth daily   Quantity:  90 tablet   Refills:  3       insulin glargine 100 UNIT/ML injection   Commonly known as:  LANTUS   Used for:  Diabetes mellitus, type 2 (H)        Inject 28 units at 10pm daily   Quantity:  30 mL   Refills:  0       lisinopril 10 MG tablet   Commonly known as:  PRINIVIL/ZESTRIL   Used for:  Type 2 diabetes mellitus with diabetic polyneuropathy, with long-term current use of insulin (H)        Dose:  10 mg   Take 1 tablet (10 mg) by mouth daily   Quantity:  90 tablet   Refills:  3       losartan 50 MG tablet   Commonly known as:  COZAAR   Used for:  HTN (hypertension)        Dose:  50 mg   Take 1 tablet (50 mg) by mouth daily   Quantity:  90 tablet   Refills:  0       meloxicam 7.5 MG tablet   Commonly known as:  MOBIC   Used for:  Facet arthropathy        Dose:  7.5 mg   Take 1 tablet (7.5 mg) by mouth daily   Quantity:  30 tablet   Refills:  1       metFORMIN 500 MG 24 hr tablet   Commonly known as:  GLUCOPHAGE-XR        Dose:  2000 mg   Take 2,000 mg by mouth daily (with dinner)   Refills:  0       nicotine 21 MG/24HR 24 hr patch   Commonly known as:  NICODERM CQ   Used for:  Cigarette nicotine dependence without complication        Dose:  1 patch   Place 1 patch onto the skin every 24 hours   Quantity:  30 patch   Refills:  3       nitroFURantoin (macrocrystal-monohydrate) 100 MG capsule   Commonly known as:  MACROBID   Used for:  Dysuria        Dose:  100 mg   Take 1 capsule (100 mg) by mouth 2 times daily   Quantity:  14 capsule   Refills:  0       pregabalin 100 MG capsule   Commonly known as:  LYRICA   Used for:  Type 2 diabetes mellitus with diabetic polyneuropathy, with long-term current use of insulin (H), Spinal stenosis, unspecified spinal region, Chronic back pain, unspecified back location,  unspecified back pain laterality        Dose:  100 mg   Take 1 capsule (100 mg) by mouth 3 times daily   Quantity:  90 capsule   Refills:  1       propranolol 20 MG tablet   Commonly known as:  INDERAL        Dose:  20 mg   Take 20 mg by mouth 2 times daily   Refills:  0       psyllium 58.6 % Powd   Commonly known as:  METAMUCIL        Refills:  0       simvastatin 10 MG tablet   Commonly known as:  ZOCOR   Used for:  Type 2 diabetes mellitus with diabetic polyneuropathy, with long-term current use of insulin (H)        Dose:  10 mg   Take 1 tablet (10 mg) by mouth At Bedtime   Quantity:  90 tablet   Refills:  1       XIFAXAN 550 MG Tabs tablet   Generic drug:  rifaximin        Dose:  200 mg   Take 200 mg by mouth 2 times daily   Refills:  0         CONTINUE these medicines which have NOT CHANGED        Dose / Directions    RAMIREZ CONTOUR NEXT test strip   Generic drug:  blood glucose monitoring        Use  to test three times a day. Use as directed. Pharmacy dispense brand based on insurance.   Refills:  0       MICROLET LANCETS Misc        three times a day.   Refills:  0                Protect others around you: Learn how to safely use, store and throw away your medicines at www.disposemymeds.org.             Medication List: This is a list of all your medications and when to take them. Check marks below indicate your daily home schedule. Keep this list as a reference.      Medications           Morning Afternoon Evening Bedtime As Needed    aspirin 81 MG EC tablet   Take 1 tablet (81 mg) by mouth daily                                RAMIREZ CONTOUR NEXT test strip   Use  to test three times a day. Use as directed. Pharmacy dispense brand based on insurance.   Generic drug:  blood glucose monitoring                                insulin glargine 100 UNIT/ML injection   Commonly known as:  LANTUS   Inject 28 units at 10pm daily                                lisinopril 10 MG tablet   Commonly known as:   PRINIVIL/ZESTRIL   Take 1 tablet (10 mg) by mouth daily                                losartan 50 MG tablet   Commonly known as:  COZAAR   Take 1 tablet (50 mg) by mouth daily                                meloxicam 7.5 MG tablet   Commonly known as:  MOBIC   Take 1 tablet (7.5 mg) by mouth daily                                metFORMIN 500 MG 24 hr tablet   Commonly known as:  GLUCOPHAGE-XR   Take 2,000 mg by mouth daily (with dinner)                                MICROLET LANCETS Misc   three times a day.                                nicotine 21 MG/24HR 24 hr patch   Commonly known as:  NICODERM CQ   Place 1 patch onto the skin every 24 hours                                nitroFURantoin (macrocrystal-monohydrate) 100 MG capsule   Commonly known as:  MACROBID   Take 1 capsule (100 mg) by mouth 2 times daily                                pregabalin 100 MG capsule   Commonly known as:  LYRICA   Take 1 capsule (100 mg) by mouth 3 times daily                                propranolol 20 MG tablet   Commonly known as:  INDERAL   Take 20 mg by mouth 2 times daily                                psyllium 58.6 % Powd   Commonly known as:  METAMUCIL                                simvastatin 10 MG tablet   Commonly known as:  ZOCOR   Take 1 tablet (10 mg) by mouth At Bedtime                                XIFAXAN 550 MG Tabs tablet   Take 200 mg by mouth 2 times daily   Generic drug:  rifaximin

## 2017-10-31 ENCOUNTER — PRE VISIT (OUTPATIENT)
Dept: UROLOGY | Facility: CLINIC | Age: 67
End: 2017-10-31

## 2017-10-31 NOTE — TELEPHONE ENCOUNTER
Patient with urinary incontinence coming in to review UDS done in Aug. Chart reviewed and all records available. No need for a call.

## 2017-11-01 NOTE — PROCEDURES
Diagnostic Medial Branch Block    The patient s identity, the procedure to be performed and the specific site of the procedure was verified in accordance with [facility] Protocol.  Diagnosis:   Pre-procedure Pain Score: 8/10           Procedure Note:  Informed consent was obtained.  The patient was positioned comfortably in the prone position.  There was no evidence of infection at the sites of needle insertion.  The patient was prepped and draped in a sterile fashion.  Skeletal landmarks were identified with the fluoroscope guidance.  25 gauge spinal needles were then placed at the junction of the superior articulating process and the transverse process for lumbar and thoracic levels and centrally on the lateral mass in the cervical region.  Medication was then injected after negative aspiration. The patient was then observed for 30 minutes.  No complications were noted.      Levels:Bilateral   L3/L4/ALA  Medication (per site): 0.5cc   0.25%  Bupivacaine    Post Procedure Pain Score: 1/10    The patient was given discharge instructions and verbalizes understanding, including understanding of those signs and symptoms that would require emergency care.     Plan:   The patient will fill out a pain diary for the remainder of the day and will either fax, email or bring it to the pain clinic.      Counseling: Greater than 50% of this patient visit was spent in counseling the patient regarding the treatment of their pain, coordinating their overall treatment plan and assessing their progress.

## 2017-11-06 ENCOUNTER — OFFICE VISIT (OUTPATIENT)
Dept: UROLOGY | Facility: CLINIC | Age: 67
End: 2017-11-06

## 2017-11-06 VITALS
BODY MASS INDEX: 25.62 KG/M2 | HEIGHT: 69 IN | SYSTOLIC BLOOD PRESSURE: 138 MMHG | HEART RATE: 49 BPM | WEIGHT: 173 LBS | DIASTOLIC BLOOD PRESSURE: 78 MMHG

## 2017-11-06 DIAGNOSIS — N31.9 NEUROGENIC BLADDER: ICD-10-CM

## 2017-11-06 DIAGNOSIS — R30.0 DYSURIA: ICD-10-CM

## 2017-11-06 DIAGNOSIS — N30.00 ACUTE CYSTITIS WITHOUT HEMATURIA: Primary | ICD-10-CM

## 2017-11-06 LAB
ALBUMIN UR-MCNC: 100 MG/DL
ANION GAP SERPL CALCULATED.3IONS-SCNC: 7 MMOL/L (ref 3–14)
APPEARANCE UR: ABNORMAL
BACTERIA #/AREA URNS HPF: ABNORMAL /HPF
BILIRUB UR QL STRIP: NEGATIVE
BUN SERPL-MCNC: 18 MG/DL (ref 7–30)
CALCIUM SERPL-MCNC: 8.7 MG/DL (ref 8.5–10.1)
CHLORIDE SERPL-SCNC: 110 MMOL/L (ref 94–109)
CO2 SERPL-SCNC: 24 MMOL/L (ref 20–32)
COLOR UR AUTO: YELLOW
CREAT SERPL-MCNC: 1.19 MG/DL (ref 0.66–1.25)
GFR SERPL CREATININE-BSD FRML MDRD: 61 ML/MIN/1.7M2
GLUCOSE SERPL-MCNC: 113 MG/DL (ref 70–99)
GLUCOSE UR STRIP-MCNC: NEGATIVE MG/DL
HGB UR QL STRIP: ABNORMAL
KETONES UR STRIP-MCNC: NEGATIVE MG/DL
LEUKOCYTE ESTERASE UR QL STRIP: ABNORMAL
MUCOUS THREADS #/AREA URNS LPF: PRESENT /LPF
NITRATE UR QL: NEGATIVE
PH UR STRIP: 5 PH (ref 5–7)
POTASSIUM SERPL-SCNC: 4.1 MMOL/L (ref 3.4–5.3)
RBC #/AREA URNS AUTO: 5 /HPF (ref 0–2)
SODIUM SERPL-SCNC: 141 MMOL/L (ref 133–144)
SOURCE: ABNORMAL
SP GR UR STRIP: 1.01 (ref 1–1.03)
SQUAMOUS #/AREA URNS AUTO: 1 /HPF (ref 0–1)
UROBILINOGEN UR STRIP-MCNC: 4 MG/DL (ref 0–2)
WBC #/AREA URNS AUTO: 37 /HPF (ref 0–2)
WBC CLUMPS #/AREA URNS HPF: PRESENT /HPF

## 2017-11-06 RX ORDER — SULFAMETHOXAZOLE/TRIMETHOPRIM 800-160 MG
1 TABLET ORAL 2 TIMES DAILY
Qty: 6 TABLET | Refills: 0 | Status: SHIPPED | OUTPATIENT
Start: 2017-11-06 | End: 2017-11-14

## 2017-11-06 RX ORDER — LACTULOSE 10 G/15ML
30 SOLUTION ORAL 3 TIMES DAILY
COMMUNITY
Start: 2017-08-28 | End: 2020-10-29

## 2017-11-06 ASSESSMENT — PAIN SCALES - GENERAL: PAINLEVEL: NO PAIN (0)

## 2017-11-06 NOTE — PATIENT INSTRUCTIONS
Schedule an appointment to learn to self catheterize.    It was a pleasure meeting with you today.  Thank you for allowing me and my team the privilege of caring for you today.  YOU are the reason we are here, and I truly hope we provided you with the excellent service you deserve.  Please let us know if there is anything else we can do for you so that we can be sure you are leaving completely satisfied with your care experience.
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
## 2017-11-06 NOTE — MR AVS SNAPSHOT
After Visit Summary   11/6/2017    Javi Celaya    MRN: [id number]           Patient Information     Date Of Birth          1950        Visit Information        Provider Department      11/6/2017 1:00 PM Usman Em MD [facility] Urology and [facility]rolog[facility]        Today's Diagnoses     Acute cystitis without hematuria    -  1       Follow-ups after your visit        Your next 10 appointments already scheduled     Nov 10, 2017  1:30 PM CST   (Arrive by 1:15 PM)   Return Visit with  Prostate Cancer Ctr Nurse   [facility] Urology and [facility] ([facility])    [address] [place] [id number] [phone]            Nov 14, 2017  2:00 PM CST   (Arrive by 1:45 PM)   Return Visit with Carolynn Reardon MD   [facility] ([facility])    [address] [place] [id number] [phone]              Who to contact     Please call your clinic at [phone] to:    Ask questions about your health    Make or cancel appointments    Discuss your medicines    Learn about your test results    Speak to your doctor   If you have compliments or concerns about an experience at your clinic, or if you wish to file a complaint, please contact [facility] Patient Relations at [phone] or email us at [email or web address]         Additional Information About Your Visit        MyChart Information     Mokat is an electronic gateway that provides easy, online access to your medical records. With Affle, you can request a clinic appointment, read your test results, renew a prescription or communicate with your care team.     To sign up for Mokat visit the website at [email or web address]   You will be asked to enter the access code listed below, as well as some personal information. Please  "follow the directions to create your username and password.     Your access code is: [email or web address]  Expires: 2018  5:30 AM     Your access code will  in 90 days. If you need help or a new code, please contact your [facility] or call [phone] for assistance.        Care EveryWhere ID     This is your Care EveryWhere ID. This could be used by other organizations to access your [place] medical records  [id number]        Your Vitals Were     Pulse Height BMI (Body Mass Index)             49 1.753 m (5' 9\") 25.55 kg/m2          Blood Pressure from Last 3 Encounters:   17 138/78   10/12/17 167/90   17 154/74    Weight from Last 3 Encounters:   17 78.5 kg (173 lb)   10/12/17 78.5 kg (173 lb)   17 78.5 kg (173 lb)              We Performed the Following     Urine Culture Aerobic Bacterial          Today's Medication Changes          These changes are accurate as of: 17  2:11 PM.  If you have any questions, ask your nurse or doctor.               Start taking these medicines.        Dose/Directions    * sulfamethoxazole-trimethoprim 800-160 MG per tablet   Commonly known as:  BACTRIM DS/SEPTRA DS   Used for:  Acute cystitis without hematuria   Started by:  Usman Em MD        Dose:  1 tablet   Take 1 tablet by mouth 2 times daily   Quantity:  6 tablet   Refills:  0       * sulfamethoxazole-trimethoprim 800-160 MG per tablet   Commonly known as:  BACTRIM DS/SEPTRA DS   Used for:  Acute cystitis without hematuria   Started by:  Usman Em MD        Dose:  1 tablet   Take 1 tablet by mouth 2 times daily   Quantity:  6 tablet   Refills:  0       * Notice:  This list has 2 medication(s) that are the same as other medications prescribed for you. Read the directions carefully, and ask your doctor or other care provider to review them with you.      Stop taking these medicines if you haven't already. Please contact your care team if " you have questions.     nitroFURantoin (macrocrystal-monohydrate) 100 MG capsule   Commonly known as:  MACROBID   Stopped by:  Usman Em MD           simvastatin 10 MG tablet   Commonly known as:  ZOCOR   Stopped by:  Usman Em MD                Where to get your medicines      [place] [facility] [place] [address] [facility]  [address], [place] [address]     Phone:  [phone]     sulfamethoxazole-trimethoprim 800-160 MG per tablet         These medications were sent to [facility], [place] - [address]   [address] [facility] [address], [facility] [address]    Hours:  24-hours Phone:  [phone]     sulfamethoxazole-trimethoprim 800-160 MG per tablet                Primary Care Provider Office Phone # Fax #    Carolynn Sariah Reardon [phone]       [address] [place] [address] [address]  [facility] [address]        Equal Access to Services     YUVAL JENNINGS AH: Hadii aad ku hadasho Soomaali, waaxda luqadaha, qaybta kaalmada adeegyada, waxay idiin hayaan florian leyva . So [facility] [phone].    ATENCIÓN: Si habla español, tiene a julio disposición servicios gratuitos de asistencia lingüística. [facility] [phone].    We comply with applicable federal civil rights laws and [place] laws. We do not discriminate on the basis of race, color, national origin, age, disability, sex, sexual orientation, or gender identity.            Thank you!     Thank you for choosing [facility] UROLOGY AND [facility] FOR PROSTATE AND UROLOGIC CANCERS  for your care. Our goal is always to provide you with excellent care. Hearing back from our patients is one way we can continue to improve our services. Please take a few minutes to complete the written survey that you may receive in the mail after your visit with us. Thank you!             Your Updated Medication List - Protect others around you: Learn how to safely use, store and  throw away your medicines at www.disposemymeds.org.          This list is accurate as of: 11/6/17  2:11 PM.  Always use your most recent med list.                   Brand Name Dispense Instructions for use Diagnosis    aspirin 81 MG EC tablet     90 tablet    Take 1 tablet (81 mg) by mouth daily    Type 2 diabetes mellitus with diabetic polyneuropathy, with long-term current use of insulin (H)       RAMIREZ CONTOUR NEXT test strip   Generic drug:  blood glucose monitoring      Use  to test three times a day. Use as directed. Pharmacy dispense brand based on insurance.        insulin glargine 100 UNIT/ML injection    LANTUS    30 mL    Inject 28 units at 10pm daily    Diabetes mellitus, type 2 (H)       lactulose 10 GM/15ML solution    CHRONULAC     Take 30 g by mouth        lisinopril 10 MG tablet    PRINIVIL/ZESTRIL    90 tablet    Take 1 tablet (10 mg) by mouth daily    Type 2 diabetes mellitus with diabetic polyneuropathy, with long-term current use of insulin (H)       losartan 50 MG tablet    COZAAR    90 tablet    Take 1 tablet (50 mg) by mouth daily    HTN (hypertension)       meloxicam 7.5 MG tablet    MOBIC    30 tablet    Take 1 tablet (7.5 mg) by mouth daily    Facet arthropathy       metFORMIN 500 MG 24 hr tablet    GLUCOPHAGE-XR     Take 2,000 mg by mouth daily (with dinner)        MICROLET LANCETS Misc      three times a day.        nicotine 21 MG/24HR 24 hr patch    NICODERM CQ    30 patch    Place 1 patch onto the skin every 24 hours    Cigarette nicotine dependence without complication       pregabalin 100 MG capsule    LYRICA    90 capsule    Take 1 capsule (100 mg) by mouth 3 times daily    Type 2 diabetes mellitus with diabetic polyneuropathy, with long-term current use of insulin (H), Spinal stenosis, unspecified spinal region, Chronic back pain, unspecified back location, unspecified back pain laterality       propranolol 20 MG tablet    INDERAL     Take 20 mg by mouth 2 times daily         psyllium 58.6 % Powd    METAMUCIL          * sulfamethoxazole-trimethoprim 800-160 MG per tablet    BACTRIM DS/SEPTRA DS    6 tablet    Take 1 tablet by mouth 2 times daily    Acute cystitis without hematuria       * sulfamethoxazole-trimethoprim 800-160 MG per tablet    BACTRIM DS/SEPTRA DS    6 tablet    Take 1 tablet by mouth 2 times daily    Acute cystitis without hematuria       XIFAXAN 550 MG Tabs tablet   Generic drug:  rifaximin      Take 200 mg by mouth 2 times daily        * Notice:  This list has 2 medication(s) that are the same as other medications prescribed for you. Read the directions carefully, and ask your doctor or other care provider to review them with you.
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
## 2017-11-09 LAB
BACTERIA SPEC CULT: ABNORMAL
Lab: ABNORMAL
SPECIMEN SOURCE: ABNORMAL

## 2017-11-10 ENCOUNTER — PRE VISIT (OUTPATIENT)
Dept: UROLOGY | Facility: CLINIC | Age: 67
End: 2017-11-10

## 2017-11-14 ENCOUNTER — OFFICE VISIT (OUTPATIENT)
Dept: INTERNAL MEDICINE | Facility: CLINIC | Age: 67
End: 2017-11-14

## 2017-11-14 VITALS — SYSTOLIC BLOOD PRESSURE: 171 MMHG | RESPIRATION RATE: 16 BRPM | HEART RATE: 51 BPM | DIASTOLIC BLOOD PRESSURE: 81 MMHG

## 2017-11-14 DIAGNOSIS — E11.9 TYPE 2 DIABETES MELLITUS WITHOUT COMPLICATION, WITH LONG-TERM CURRENT USE OF INSULIN (H): ICD-10-CM

## 2017-11-14 DIAGNOSIS — Z13.89 SCREENING FOR DIABETIC PERIPHERAL NEUROPATHY: ICD-10-CM

## 2017-11-14 DIAGNOSIS — Z79.4 TYPE 2 DIABETES MELLITUS WITHOUT COMPLICATION, WITH LONG-TERM CURRENT USE OF INSULIN (H): ICD-10-CM

## 2017-11-14 DIAGNOSIS — R26.2 DIFFICULTY WALKING: ICD-10-CM

## 2017-11-14 DIAGNOSIS — E11.42 TYPE 2 DIABETES MELLITUS WITH DIABETIC POLYNEUROPATHY, WITH LONG-TERM CURRENT USE OF INSULIN (H): ICD-10-CM

## 2017-11-14 DIAGNOSIS — Z79.4 TYPE 2 DIABETES MELLITUS WITH DIABETIC POLYNEUROPATHY, WITH LONG-TERM CURRENT USE OF INSULIN (H): ICD-10-CM

## 2017-11-14 DIAGNOSIS — R80.9 PROTEINURIA, UNSPECIFIED TYPE: ICD-10-CM

## 2017-11-14 DIAGNOSIS — E11.42 DIABETIC POLYNEUROPATHY ASSOCIATED WITH TYPE 2 DIABETES MELLITUS (H): Primary | ICD-10-CM

## 2017-11-14 LAB
CHOLEST SERPL-MCNC: 172 MG/DL
CREAT UR-MCNC: 50 MG/DL
HBA1C MFR BLD: 5.7 % (ref 4.3–6)
HDLC SERPL-MCNC: 66 MG/DL
LDLC SERPL CALC-MCNC: 90 MG/DL
MICROALBUMIN UR-MCNC: 586 MG/L
MICROALBUMIN/CREAT UR: 1174.35 MG/G CR (ref 0–17)
NONHDLC SERPL-MCNC: 106 MG/DL
TRIGL SERPL-MCNC: 83 MG/DL

## 2017-11-14 RX ORDER — LISINOPRIL 20 MG/1
20 TABLET ORAL DAILY
Qty: 90 TABLET | Refills: 3 | Status: SHIPPED | OUTPATIENT
Start: 2017-11-14 | End: 2019-06-21

## 2017-11-14 RX ORDER — NORTRIPTYLINE HCL 25 MG
25 CAPSULE ORAL AT BEDTIME
Qty: 30 CAPSULE | Refills: 1 | Status: SHIPPED | OUTPATIENT
Start: 2017-11-14 | End: 2019-06-19

## 2017-11-14 ASSESSMENT — PAIN SCALES - GENERAL: PAINLEVEL: EXTREME PAIN (9)

## 2017-11-14 NOTE — PROGRESS NOTES
Rooming Note    Health Maintenance  Health Maintenance Due   Topic Date Due     FOOT EXAM Q1 YEAR  02/07/1951     LIPID MONITORING Q1 YEAR  02/07/1951     MICROALBUMIN Q1 YEAR  02/07/1951     HEPATITIS C SCREENING  02/07/1969     ADVANCE DIRECTIVE PLANNING Q5 YRS  02/07/2006     FALL RISK ASSESSMENT  02/07/2016     PNEUMOCOCCAL (2 of 2 - PPSV23) 08/19/2017     INFLUENZA VACCINE (SYSTEM ASSIGNED)  09/01/2017     A1C Q6 MO  10/26/2017     HM discussed and pended   The following immunizations were discussed, but NOT ordered:   - Pneumococcal Vaccine  - Influenza (flu shot)  The orders were not placed because: patient declined    Blood Pressure  BP Readings from Last 1 Encounters:   11/14/17 166/67   Single BP recheck started, 1:40 PM (4 minutes)    Gena Yee CMA at 1:40 PM on 11/14/2017

## 2017-11-14 NOTE — PATIENT INSTRUCTIONS
Primary Care Center Phone Number [phone]  Primary Care Center Medication Refill Request Information:  * Please contact your pharmacy regarding ANY request for medication refills.  ** [facility] Prescription Fax = [phone]  * Please allow 3 business days for routine medication refills.  * Please allow 5 business days for controlled substance medication refills.     Primary Care Center Test Result notification information:  *You will be notified with in 7-10 days of your appointment day regarding the results of your test.  If you are on MyChart you will be notified as soon as the provider has reviewed the results and signed off on them.    HOW TO QUIT SMOKING  Smoking is one of the hardest habits to break. About half of all those who have ever smoked have been able to quit, and most of those (about 70%) who still smoke want to quit. Here are some of the best ways to stop smoking.     KEEP TRYING:  It takes most smokers about 8 tries before they are finally able to fully quit. So, the more often you try and fail, the better your chance of quitting the next time! So, don't give up!    GO COLD TURKEY:  Most ex-smokers quit cold turkey. Trying to cut back gradually doesn't seem to work as well, perhaps because it continues the smoking habit. Also, it is possible to fool yourself by inhaling more while smoking fewer cigarettes. This results in the same amount of nicotine in your body!    GET SUPPORT:  Support programs can make an important difference, especially for the heavy smoker. These groups offer lectures, methods to change your behavior and peer support. Call the free national Quitline for more information. 800-QUIT-NOW (623-166-6417). Low-cost or free programs are offered by many hospitals, local chapters of the American Lung Association (618-387-0410) and the American Cancer Society (670-460-4453). Support at home is important too. Non-smokers can help by offering praise and encouragement. If the smoker fails to  quit, encourage them to try again!  OVER-THE-COUNTER MEDICINES:  For those who can't quit on their own, Nicotine Replacement Therapy (NRT) may make quitting much easier. Certain aids such as the nicotine patch, gum and lozenge are available without a prescription. However, it is best to use these under the guidance of your doctor. The skin patch provides a steady supply of nicotine to the body. Nicotine gum and lozenge gives temporary bursts of low levels of nicotine. Both methods take the edge off the craving for cigarettes. WARNING: If you feel symptoms of nicotine overdose, such as nausea, vomiting, dizziness, weakness, or fast heartbeat, stop using these and see your doctor.    PRESCRIPTION MEDICINES:  After evaluating your smoking patterns and prior attempts at quitting, your doctor may offer a prescription medicine such as bupropion (Zyban, Wellbutrin), varenicline (Chantix, Champix), a niocotine inhaler or nasal spray. Each has its unique advantage and side effects which your doctor can review with you.    HEALTH BENEFITS OF QUITTING:  The benefits of quitting start right away and keep improving the longer you go without smokin minutes: blood pressure and pulse return to normal    8 hours: oxygen levels return to normal    2 days: ability to smell and taste begins to improve as damaged nerves start to regrow    2-3 weeks: circulation and lung function improves    1-9 months: decreased cough, congestion and shortness of breath; less tired    1 year: risk of heart attack decreases by half    5 years: risk of lung cancer decreases by half; risk of stroke becomes the same as a non-smoker  For information about how to quit smoking, visit the following links:    [employer] [place] ,   Clearing the Air, Quit Smoking Today   - an online booklet. http://www.smokefree.gov/pubs/clearing_the_air.pdf    Smokefree.gov http://smokefree.gov/    QuitNet http://www.quitnet.com/    [address] [place] [address]. All rights reserved. This information is not intended as a substitute for professional medical care. Always follow your healthcare professional's instructions.

## 2017-11-14 NOTE — MR AVS SNAPSHOT
After Visit Summary   11/14/2017    Javi Celaya    MRN: [id number]           Patient Information     Date Of Birth          1950        Visit Information        Provider Department      11/14/2017 2:00 PM Carolynn Zafar MD [facility]        Today's Diagnoses     Diabetic polyneuropathy associated with type 2 diabetes mellitus (H)    -  1    Screening for diabetic peripheral neuropathy        Difficulty walking        Type 2 diabetes mellitus without complication, with long-term current use of insulin (H)        Type 2 diabetes mellitus with diabetic polyneuropathy, with long-term current use of insulin (H)          Care Instructions    Primary Care Center Phone Number [phone]  Primary Care Center Medication Refill Request Information:  * Please contact your pharmacy regarding ANY request for medication refills.  ** [facility] = [phone]  * Please allow 3 business days for routine medication refills.  * Please allow 5 business days for controlled substance medication refills.     Primary Care Center Test Result notification information:  *You will be notified with in 7-10 days of your appointment day regarding the results of your test.  If you are on MyChart you will be notified as soon as the provider has reviewed the results and signed off on them.    HOW TO QUIT SMOKING  Smoking is one of the hardest habits to break. About half of all those who have ever smoked have been able to quit, and most of those (about 70%) who still smoke want to quit. Here are some of the best ways to stop smoking.     KEEP TRYING:  It takes most smokers about 8 tries before they are finally able to fully quit. So, the more often you try and fail, the better your chance of quitting the next time! So, don't give up!    GO COLD TURKEY:  Most ex-smokers quit cold turkey. Trying to cut back gradually doesn't seem to work as well, perhaps because it continues the smoking  habit. Also, it is possible to fool yourself by inhaling more while smoking fewer cigarettes. This results in the same amount of nicotine in your body!    GET SUPPORT:  Support programs can make an important difference, especially for the heavy smoker. These groups offer lectures, methods to change your behavior and peer support. Call the free national Quitline for more information. 800-QUIT-NOW (078-240-6804). Low-cost or free programs are offered by many hospitals, local chapters of the American Lung Association (383-582-4076) and the American Cancer Society (423-616-0422). Support at home is important too. Non-smokers can help by offering praise and encouragement. If the smoker fails to quit, encourage them to try again!  OVER-THE-COUNTER MEDICINES:  For those who can't quit on their own, Nicotine Replacement Therapy (NRT) may make quitting much easier. Certain aids such as the nicotine patch, gum and lozenge are available without a prescription. However, it is best to use these under the guidance of your doctor. The skin patch provides a steady supply of nicotine to the body. Nicotine gum and lozenge gives temporary bursts of low levels of nicotine. Both methods take the edge off the craving for cigarettes. WARNING: If you feel symptoms of nicotine overdose, such as nausea, vomiting, dizziness, weakness, or fast heartbeat, stop using these and see your doctor.    PRESCRIPTION MEDICINES:  After evaluating your smoking patterns and prior attempts at quitting, your doctor may offer a prescription medicine such as bupropion (Zyban, Wellbutrin), varenicline (Chantix, Champix), a niocotine inhaler or nasal spray. Each has its unique advantage and side effects which your doctor can review with you.    HEALTH BENEFITS OF QUITTING:  The benefits of quitting start right away and keep improving the longer you go without smokin minutes: blood pressure and pulse return to normal    8 hours: oxygen levels return to  normal    2 days: ability to smell and taste begins to improve as damaged nerves start to regrow    2-3 weeks: circulation and lung function improves    1-9 months: decreased cough, congestion and shortness of breath; less tired    1 year: risk of heart attack decreases by half    5 years: risk of lung cancer decreases by half; risk of stroke becomes the same as a non-smoker  For information about how to quit smoking, visit the following links:    National Cancer [place] ,   Clearing the Air, Quit Smoking Today   - an online booklet. http://www.smokefree.gov/pubs/clearing_the_air.pdf    Smokefree.gov http://smokefree.gov/    QuitNet http://www.quitnet.com/    5709-6479 Candice Oniel, [address] [place] [address], [place] [address]. All rights reserved. This information is not intended as a substitute for professional medical care. Always follow your healthcare professional's instructions.                     Follow-ups after your visit        Additional Services     PHYSICAL THERAPY REFERRAL       *This therapy referral will be filtered to a centralized scheduling office at [facility] and the patient will receive a call to schedule an appointment at a [facility] location most convenient for them. *     [facility] provides Physical Therapy evaluation and treatment and many specialty services across the [facility] system.  If requesting a specialty program, please choose from the list below.    If you have not heard from the scheduling office within 2 business days, please call [phone] for all locations, with the exception of [place], please call [phone].  Treatment: Evaluation & Treatment  Special Instructions/Modalities: as indicated  Special Programs: None    Please be aware that coverage of these services is subject to the terms and limitations of your health insurance plan.  Call member services at your health plan with any benefit or coverage questions.      **Note  "to Provider:  If you are referring outside of [facility] for the therapy appointment, please list the name of the location in the \"special instructions\" above, print the referral and give to the patient to schedule the appointment.                  Your next 10 appointments already scheduled     Nov 14, 2017  2:00 PM CST   (Arrive by 1:45 PM)   Return Visit with Carolynn Reardon MD   [facility] ([facility])    [address] [place] [address]  [address]th [facility] [id number] [phone]              Future tests that were ordered for you today     Open Future Orders        Priority Expected Expires Ordered    HEMOGLOBIN A1C -(Today) Routine 11/14/2017 11/14/2018 11/14/2017    Lipid panel reflex to direct LDL Fasting Routine 11/14/2017 11/14/2018 11/14/2017    Albumin Random Urine Quantitative with Creat Ratio Routine 11/14/2017 11/14/2018 11/14/2017            Who to contact     Please call your clinic at [phone] to:    Ask questions about your health    Make or cancel appointments    Discuss your medicines    Learn about your test results    Speak to your doctor   If you have compliments or concerns about an experience at your clinic, or if you wish to file a complaint, please contact [facility] Patient Relations at [phone] or email us at [email or web address]         Additional Information About Your Visit        MyChart Information     Tapgaget is an electronic gateway that provides easy, online access to your medical records. With [employer], you can request a clinic appointment, read your test results, renew a prescription or communicate with your care team.     To sign up for Tapgaget visit the website at [email or web address]   You will be asked to enter the access code listed below, as well as some personal information. Please follow the directions to create your username and password.     Your access code is: " [email or web address]  Expires: 2018  5:30 AM     Your access code will  in 90 days. If you need help or a new code, please contact your [facility] or call [phone] for assistance.        Care EveryWhere ID     This is your Care EveryWhere ID. This could be used by other organizations to access your [facility] medical records  [id number]        Your Vitals Were     Pulse Respirations                51 16           Blood Pressure from Last 3 Encounters:   17 171/81   17 138/78   10/12/17 167/90    Weight from Last 3 Encounters:   17 78.5 kg (173 lb)   10/12/17 78.5 kg (173 lb)   17 78.5 kg (173 lb)              We Performed the Following     FOOT EXAM - NO CHARGE     PHYSICAL THERAPY REFERRAL          Today's Medication Changes          These changes are accurate as of: 17  1:55 PM.  If you have any questions, ask your nurse or doctor.               Start taking these medicines.        Dose/Directions    nortriptyline 25 MG capsule   Commonly known as:  PAMELOR   Used for:  Diabetic polyneuropathy associated with type 2 diabetes mellitus (H)   Started by:  Carolynn Zafar MD        Dose:  25 mg   Take 1 capsule (25 mg) by mouth At Bedtime   Quantity:  30 capsule   Refills:  1       STATIN NOT PRESCRIBED (INTENTIONAL)   Used for:  Type 2 diabetes mellitus with diabetic polyneuropathy, with long-term current use of insulin (H)   Started by:  Carolynn Zafar MD        Please choose reason not prescribed, below   Refills:  0         These medicines have changed or have updated prescriptions.        Dose/Directions    lisinopril 20 MG tablet   Commonly known as:  PRINIVIL/ZESTRIL   This may have changed:    - medication strength  - how much to take   Used for:  Type 2 diabetes mellitus with diabetic polyneuropathy, with long-term current use of insulin (H)   Changed by:  Carolynn Zafar MD        Dose:  20 mg   Take 1  tablet (20 mg) by mouth daily   Quantity:  90 tablet   Refills:  3         Stop taking these medicines if you haven't already. Please contact your care team if you have questions.     losartan 50 MG tablet   Commonly known as:  COZAAR   Stopped by:  Carolynn Zafar MD           sulfamethoxazole-trimethoprim 800-160 MG per tablet   Commonly known as:  BACTRIM DS/SEPTRA DS   Stopped by:  Carolynn Zafar MD                Where to get your medicines      [place] [facility] [place] [address], [place] [address]     Phone:  [phone]     lisinopril 20 MG tablet    nortriptyline 25 MG capsule         Some of these will need a paper prescription and others can be bought over the counter.  Ask your nurse if you have questions.     You don't need a prescription for these medications     STATIN NOT PRESCRIBED (INTENTIONAL)                Primary Care Provider Office Phone # Fax #    Carolynn Reardon [phone]       [address] [place] [address] [address] [place] [id number]        Equal Access to Services     YUVAL [facility] UnitFARHEEN : Hadii jake goldberg Soan, waaxda luqadaha, qaybta kaalmamaynor infante, dickson baron. So [facility] [phone].    ATENCIÓN: Si habla español, tiene a julio disposición servicios gratuitos de asistencia lingüística. [facility] [phone].    We comply with applicable federal civil rights laws and [place] laws. We do not discriminate on the basis of race, color, national origin, age, disability, sex, sexual orientation, or gender identity.            Thank you!     Thank you for choosing [facility]  for your care. Our goal is always to provide you with excellent care. Hearing back from our patients is one way we can continue to improve our services. Please take a few minutes to complete the written survey that you may receive in the mail  after your visit with us. Thank you!             Your Updated Medication List - Protect others around you: Learn how to safely use, store and throw away your medicines at www.disposemymeds.org.          This list is accurate as of: 11/14/17  1:55 PM.  Always use your most recent med list.                   Brand Name Dispense Instructions for use Diagnosis    aspirin 81 MG EC tablet     90 tablet    Take 1 tablet (81 mg) by mouth daily    Type 2 diabetes mellitus with diabetic polyneuropathy, with long-term current use of insulin (H)       RAMIREZ CONTOUR NEXT test strip   Generic drug:  blood glucose monitoring      Use  to test three times a day. Use as directed. Pharmacy dispense brand based on insurance.        insulin glargine 100 UNIT/ML injection    LANTUS    30 mL    Inject 28 units at 10pm daily    Diabetes mellitus, type 2 (H)       lactulose 10 GM/15ML solution    CHRONULAC     Take 30 g by mouth        lisinopril 20 MG tablet    PRINIVIL/ZESTRIL    90 tablet    Take 1 tablet (20 mg) by mouth daily    Type 2 diabetes mellitus with diabetic polyneuropathy, with long-term current use of insulin (H)       meloxicam 7.5 MG tablet    MOBIC    30 tablet    Take 1 tablet (7.5 mg) by mouth daily    Facet arthropathy       metFORMIN 500 MG 24 hr tablet    GLUCOPHAGE-XR     Take 2,000 mg by mouth daily (with dinner)        MICROLET LANCETS Misc      three times a day.        nicotine 21 MG/24HR 24 hr patch    NICODERM CQ    30 patch    Place 1 patch onto the skin every 24 hours    Cigarette nicotine dependence without complication       nortriptyline 25 MG capsule    PAMELOR    30 capsule    Take 1 capsule (25 mg) by mouth At Bedtime    Diabetic polyneuropathy associated with type 2 diabetes mellitus (H)       pregabalin 100 MG capsule    LYRICA    90 capsule    Take 1 capsule (100 mg) by mouth 3 times daily    Type 2 diabetes mellitus with diabetic polyneuropathy, with long-term current use of insulin (H), Spinal  stenosis, unspecified spinal region, Chronic back pain, unspecified back location, unspecified back pain laterality       propranolol 20 MG tablet    INDERAL     Take 20 mg by mouth 2 times daily        psyllium 58.6 % Powd    METAMUCIL          STATIN NOT PRESCRIBED (INTENTIONAL)      Please choose reason not prescribed, below    Type 2 diabetes mellitus with diabetic polyneuropathy, with long-term current use of insulin (H)       XIFAXAN 550 MG Tabs tablet   Generic drug:  rifaximin      Take 200 mg by mouth 2 times daily

## 2017-11-14 NOTE — NURSING NOTE
Chief Complaint   Patient presents with     Pain     pt is here to discuss nerve pain in bilateral legs        Gena Yee CMA at 1:36 PM on 11/14/2017

## 2017-11-14 NOTE — PROGRESS NOTES
PRIMARY CARE CENTER       SUBJECTIVE:  Javi Celaya is a 67 year old male who comes in for f/u on diabetic neuropathy. Legs really bother him at night, despite kaitlin lyrica. He would also like to do PT b/c he feels his legs give out on him at times. Declines a foot exam today. BP is high, but has taken meds today. Due for A1c today.     Past Medical History:   Diagnosis Date     Anxiety      Depressive disorder      Diabetes (H)      Hypertension      Liver failure (H)          Medications and allergies reviewed by me today.     ROS:   Constitutional, HEENT, cardiovascular, pulmonary, gi and gu systems are negative, except as otherwise noted.      OBJECTIVE:  /81  Pulse 51  Resp 16   Wt Readings from Last 1 Encounters:   11/06/17 78.5 kg (173 lb)       GENERAL APPEARANCE: healthy, alert and no distress     EYES: EOMI,  PERRL     HENT: ear canals and TM's normal and nose and mouth without ulcers or lesions     NECK: no adenopathy, no asymmetry, masses, or scars and thyroid normal to palpation     RESP: lungs clear to auscultation - no rales, rhonchi or wheezes     CV: regular rates and rhythm, normal S1 S2, no S3 or S4 and no murmur, click or rub     ABDOMEN:  soft, nontender, no HSM or masses and bowel sounds normal     MS: extremities normal- no gross deformities noted, no evidence of inflammation in joints, FROM in all extremities.     SKIN: no suspicious lesions or rashes     NEURO: Normal strength and tone, sensory exam grossly normal, mentation intact and speech normal     PSYCH: mentation appears normal. and affect normal/bright     LYMPHATICS: No axillary, cervical, or supraclavicular nodes     ASSESSMENT/PLAN:    Javi was seen today for pain.    Diagnoses and all orders for this visit:    Diabetic polyneuropathy associated with type 2 diabetes mellitus (H)  -     nortriptyline (PAMELOR) 25 MG capsule; Take 1 capsule (25 mg) by mouth At Bedtime    Difficulty walking  -     PHYSICAL  THERAPY REFERRAL    Type 2 diabetes mellitus without complication, with long-term current use of insulin (H)  Type 2 diabetes mellitus with diabetic polyneuropathy, with long-term current use of insulin (H)  -     HEMOGLOBIN A1C -(Today); Future  -     Lipid panel reflex to direct LDL Fasting; Future  -     Albumin Random Urine Quantitative with Creat Ratio; Future  -     lisinopril (PRINIVIL/ZESTRIL) 20 MG tablet; Take 1 tablet (20 mg) by mouth daily  -     STATIN NOT PRESCRIBED, INTENTIONAL,; Please choose reason not prescribed, below    Microalbumin returned at 1100. Will refer to nephrology.     Carolynn Reardon MD  11/14/17

## 2017-11-17 ENCOUNTER — HOSPITAL ENCOUNTER (OUTPATIENT)
Dept: PHYSICAL THERAPY | Facility: CLINIC | Age: 67
Setting detail: THERAPIES SERIES
End: 2017-11-17
Attending: INTERNAL MEDICINE
Payer: COMMERCIAL

## 2017-11-17 PROCEDURE — 97110 THERAPEUTIC EXERCISES: CPT | Mod: GP | Performed by: PHYSICAL THERAPIST

## 2017-11-17 PROCEDURE — 97161 PT EVAL LOW COMPLEX 20 MIN: CPT | Mod: GP | Performed by: PHYSICAL THERAPIST

## 2017-11-17 PROCEDURE — 40000719 ZZHC STATISTIC PT DEPARTMENT NEURO VISIT: Performed by: PHYSICAL THERAPIST

## 2017-11-20 DIAGNOSIS — M54.9 CHRONIC BACK PAIN, UNSPECIFIED BACK LOCATION, UNSPECIFIED BACK PAIN LATERALITY: ICD-10-CM

## 2017-11-20 DIAGNOSIS — M48.00 SPINAL STENOSIS, UNSPECIFIED SPINAL REGION: ICD-10-CM

## 2017-11-20 DIAGNOSIS — Z79.4 TYPE 2 DIABETES MELLITUS WITH DIABETIC POLYNEUROPATHY, WITH LONG-TERM CURRENT USE OF INSULIN (H): ICD-10-CM

## 2017-11-20 DIAGNOSIS — G89.29 CHRONIC BACK PAIN, UNSPECIFIED BACK LOCATION, UNSPECIFIED BACK PAIN LATERALITY: ICD-10-CM

## 2017-11-20 DIAGNOSIS — E11.42 TYPE 2 DIABETES MELLITUS WITH DIABETIC POLYNEUROPATHY, WITH LONG-TERM CURRENT USE OF INSULIN (H): ICD-10-CM

## 2017-11-20 RX ORDER — PREGABALIN 100 MG/1
100 CAPSULE ORAL 3 TIMES DAILY
Qty: 90 CAPSULE | Refills: 1 | Status: SHIPPED | OUTPATIENT
Start: 2017-11-20 | End: 2018-01-16

## 2017-11-20 NOTE — ADDENDUM NOTE
Encounter addended by: Mary Lou Valentine, PT on: 11/20/2017  7:20 AM<BR>     Actions taken: Sign clinical note, Flowsheet accepted, Charge Capture section accepted

## 2017-11-20 NOTE — PROGRESS NOTES
11/17/17 1000   Quick Adds   Type of Visit Initial OP PT Evaluation   General Information   Start of Care Date 11/17/17   Referring Physician Carolynn Zafar MD   Orders Evaluate and Treat as Indicated   Order Date 11/14/17   Medical Diagnosis Difficulty Walking   Precautions/Limitations fall precautions   Surgical/Medical history reviewed Yes   Pertinent history of current problem (include personal factors and/or comorbidities that impact the POC) Uses [employer]. Using a scooter to get to clinic. Pt has PCA 3 times a week for cleaning, dress, shower, and laundry. Uses the scooter mostly outside of house but sometimes uses 2ww. Has had 3 falls in the last year. No serious injuries. 1st fall from sitting on couch and reaching foward. Second, walking with 2ww and legs gave out. Third, using 2ww and hit pebble and flipped over. Still has LBP, worse in the moring, 7/10 now and 10/10 in the morning. Was scheduled for lumbar block, but not happen yet. Pt wishes to have it but has not been called back. PT will assist in find out when procedure is scheduled. reports having alterned/decreased sensation in feet/hands. Gets about ~4hours a sleep enough. tried some sleep meds but not working. has hx of UTI, not using catheter, medication has helped. Has intermittent muscle spasms at rest and worsens with movement.    Previous/Current Treatment Physical Therapy   Current Community Support Personal care attendant;Family/friend caregiver;Meals on wheels;Transportation service   Patient role/Employment history Disabled;Unemployed   Living environment Apartment/condo   Home/Community Accessibility Comments does not need to go up/down stairs, uses elevator   Current Assistive Devices Front Wheeled Walker;Scooter   Patient/Family Goals Statement Pt wishes to be able to stand more, walk more, and be able to walk short distances without an AD. Wants to be able to walk outside sometimes without scooter. Improve  "balance/confidence   Fall Risk Screen   Fall screen completed by PT   Have you fallen 2 or more times in the past year? Yes   Have you fallen and had an injury in the past year? No   Timed Up and Go score (seconds) 22.72   Is patient a fall risk? Yes   Fall screen comments Pt is falls risk due to TUG score and history of falls.    Pain   Patient currently in pain Yes   Pain location Low back   Pain rating currently 7/10, 10/10 in mornings   Additional pain locations? Pain location 2   Pain location 2 left arm   Pain comments was told by doctor that it is cervicogenic   Vital Signs   Vital Signs Pulse;BP   Pulse 55   BP (!) 166/79   Cognitive Status Examination   Orientation orientation to person, place and time   Level of Consciousness alert   Follows Commands and Answers Questions 100% of the time   Personal Safety and Judgment intact   Memory intact   Cognitive Comment reports not being able to read well   Integumentary   Integumentary Other   Integumentary Comments Pt has dry skin on lower legs. No cuts or open wounds on LE below knees. Thick toenails that pt cuts himself. No hair on legs below LE.    Posture   Posture Protracted shoulders   Range of Motion (ROM)   ROM Comment Bilateral Knees WNL, Bilat ankles restricted especially into DF, Bilat toes restricted in all plans of motion.   Strength   Strength Comments Knee Ext, Ankle DF, Great Toe Extension, Hip Flexion 5/5 (only assessed seated due to time/space)   Transfer Skills   Transfer Comments independent sit to stand w / use of UE\"s, stand to sit, and transfer scooter to chair   Gait   Gait Gait Analysis;Stairs   Gait Comments heavy use of UE on 2ww. Minimal knee extension throughout gait. Circumducted gait.   Gait Analysis   Gait Pattern Used swing-through gait   Gait Deviations Noted decreased timi;increased stride width;decreased weight-shifting ability   Impairments Contributing to Gait Deviations impaired balance;decreased flexibility;impaired " motor control;decreased ROM;decreased sensation   Stairs   Self Performance Independent   Physical/Nonphysical Assist: Stairs No set-up   Rails 2 rails   Indicate number of stairs 3   Gait Special Tests   Gait Special Tests 25 FOOT TIMED WALK;DYNAMIC GAIT INDEX   Gait Special Tests 25 Foot Timed Walk   Seconds 12.72   Steps 9 Steps   Comments large steps. decrease motor control.    Gait Special Tests Dynamic Gait Index   Score out of 24 10   Balance Special Tests   Balance Special Tests Timed up and go   Balance Special Tests Timed Up and Go   Seconds 22.72 Seconds   Comments slow in turning with 2ww   Sensory Examination   Sensory Perception other (describe)   Sensory Perception Comments absent vibration bilat medial malleolus. Severely impaired sensation on bilat feet (1-2/10) with monofilament, no impaired sensation below knee to ankle.   Sensory Perception Quick Adds Light touch   Sensation Light Touch   LLE severe impairment   RLE severe impairment   Reflexes   Deep Tendon Reflexes Quadriceps;Achilles   Quadriceps (L2-L4) Right 3+, Left 2+   Achilles (S1-S2) Bilat 0 with [place]    Planned Therapy Interventions   Planned Therapy Interventions balance training;bed mobility training;gait training;joint mobilization;neuromuscular re-education;ROM;strengthening;stretching;manual therapy   Clinical Impression   Criteria for Skilled Therapeutic Interventions Met yes, treatment indicated   PT Diagnosis impaired balance/gait   Influenced by the following impairments Decreased ankle ROM, impaired bilat LE senstation   Functional limitations due to impairments unable to walk safely medium-long distances with 2ww, impaired standing balance   Clinical Presentation Stable/Uncomplicated   Clinical Presentation Rationale pt medical hx, clinical judgement   Clinical Decision Making (Complexity) Low complexity   Therapy Frequency other (see comments)   Predicted Duration of Therapy Intervention (days/wks) up to 8x in 60 days    Risk & Benefits of therapy have been explained Yes   Patient, Family & other staff in agreement with plan of care Yes   Clinical Impression Comments Pt motivated. Has LE MMT strength. Indicated for gait training and motor coordination training.   1x Eval Only-Outpatient/Observation Medicare G-Code   G-code Mobility: Walking & Moving Around   Education Assessment   Preferred Learning Style Listening;Demonstration;Pictures/video   Barriers to Learning Reading   GOALS   PT Eval Goals 1;2;3;4   Goal 1   Goal Identifier Gait   Goal Description Patient will be able to walk with 2ww with appropriate gait pattern noted by increased knee flexion, upright trunk, and bilat heel strike   Target Date 01/12/18   Goal 2   Goal Identifier Mobility   Goal Description Patient will be able to walk 750 feet with 2ww with minimal change in vitals and percieved exertion   Target Date 01/12/18   Goal 3   Goal Identifier HEP   Goal Description Patient will be able to be independent with HEP to further LE coordination and improve ROM   Target Date 01/12/18   Goal 4   Goal Identifier Pt goal   Goal Description Patient will be able to walk with cane or no AD for 75 feet in order to walk safely in the home   Target Date 01/12/18   Total Evaluation Time   Total Evaluation Time (Minutes) 60

## 2017-11-21 ENCOUNTER — TELEPHONE (OUTPATIENT)
Dept: ANESTHESIOLOGY | Facility: CLINIC | Age: 67
End: 2017-11-21

## 2017-11-21 DIAGNOSIS — M47.819 FACET ARTHROPATHY: Primary | ICD-10-CM

## 2017-11-21 NOTE — TELEPHONE ENCOUNTER
LPN read through the Pre-procedure instructions with pt.   Pt verbalized understanding to holding appropriate medication per protocol, and was agreeable to NPO policy and needing a .    Instructions will be sent to Pt, in the mail. Address was confirmed.     Rose Shaver LPN

## 2017-11-21 NOTE — ADDENDUM NOTE
Encounter addended by: Mary Lou Valentine, PT on: 11/21/2017  7:34 AM<BR>     Actions taken: Flowsheet accepted

## 2017-11-22 NOTE — TELEPHONE ENCOUNTER
WERON followed up with Dr. Estrada, who was agreeable to Pt continuing their ASA 81 mg and their MOBIC before their Bilateral Lumbar RFA.    Dr. Estrada was also agreeable to pt having the Lumbar RFA in 1 procedure, for 45 minutes.   LPN placed the order as such.    Rose Shaver LPN

## 2017-12-05 ENCOUNTER — SURGERY (OUTPATIENT)
Age: 67
End: 2017-12-05

## 2017-12-05 ENCOUNTER — MEDICAL CORRESPONDENCE (OUTPATIENT)
Dept: HEALTH INFORMATION MANAGEMENT | Facility: CLINIC | Age: 67
End: 2017-12-05

## 2017-12-05 ENCOUNTER — HOSPITAL ENCOUNTER (OUTPATIENT)
Facility: AMBULATORY SURGERY CENTER | Age: 67
End: 2017-12-05

## 2017-12-05 VITALS
BODY MASS INDEX: 25.18 KG/M2 | SYSTOLIC BLOOD PRESSURE: 182 MMHG | RESPIRATION RATE: 16 BRPM | DIASTOLIC BLOOD PRESSURE: 99 MMHG | OXYGEN SATURATION: 99 % | TEMPERATURE: 97.2 F | HEIGHT: 69 IN | WEIGHT: 170 LBS

## 2017-12-05 LAB — GLUCOSE BLDC GLUCOMTR-MCNC: 103 MG/DL (ref 70–99)

## 2017-12-05 RX ORDER — LIDOCAINE HYDROCHLORIDE 20 MG/ML
INJECTION, SOLUTION EPIDURAL; INFILTRATION; INTRACAUDAL; PERINEURAL PRN
Status: DISCONTINUED | OUTPATIENT
Start: 2017-12-05 | End: 2017-12-05 | Stop reason: HOSPADM

## 2017-12-05 RX ORDER — LIDOCAINE HYDROCHLORIDE 10 MG/ML
INJECTION, SOLUTION EPIDURAL; INFILTRATION; INTRACAUDAL; PERINEURAL PRN
Status: DISCONTINUED | OUTPATIENT
Start: 2017-12-05 | End: 2017-12-05 | Stop reason: HOSPADM

## 2017-12-05 RX ORDER — METHYLPREDNISOLONE ACETATE 80 MG/ML
INJECTION, SUSPENSION INTRA-ARTICULAR; INTRALESIONAL; INTRAMUSCULAR; SOFT TISSUE PRN
Status: DISCONTINUED | OUTPATIENT
Start: 2017-12-05 | End: 2017-12-05 | Stop reason: HOSPADM

## 2017-12-05 RX ADMIN — LIDOCAINE HYDROCHLORIDE 17 ML: 10 INJECTION, SOLUTION EPIDURAL; INFILTRATION; INTRACAUDAL; PERINEURAL at 10:29

## 2017-12-05 RX ADMIN — METHYLPREDNISOLONE ACETATE 1 ML: 80 INJECTION, SUSPENSION INTRA-ARTICULAR; INTRALESIONAL; INTRAMUSCULAR; SOFT TISSUE at 10:30

## 2017-12-05 RX ADMIN — LIDOCAINE HYDROCHLORIDE 6 ML: 20 INJECTION, SOLUTION EPIDURAL; INFILTRATION; INTRACAUDAL; PERINEURAL at 10:29

## 2017-12-05 NOTE — IP AVS SNAPSHOT
MRN:[id number]                      After Visit Summary   12/5/2017    Javi Celaya    MRN: [id number]           Thank you!     Thank you for choosing [facility] for your care. Our goal is always to provide you with excellent care. Hearing back from our patients is one way we can continue to improve our services. Please take a few minutes to complete the written survey that you may receive in the mail after you visit with us. Thank you!        Patient Information     Date Of Birth          1950        About your hospital stay     You were admitted on:  December 5, 2017 You last received care in the:  [facility]    You were discharged on:  December 5, 2017       Who to Call     For medical emergencies, please call 911.  For non-urgent questions about your medical care, please call your primary care provider or clinic, [phone]  For questions related to your surgery, please call your surgery clinic        Attending Provider     Provider Specialty    Fransisco Estrada MD Anesthesiology       Primary Care Provider Office Phone # Fax #    Carolynn Sariah Reardon [phone]      Your next 10 appointments already scheduled     Dec 07, 2017  3:00 PM CST   Neuro Treatment with Mary Lou Valentine, PT   [facility], [place], Physical Therapy - Outpatient ([facility])    [address]  [place] [address]   [phone]            Dec 14, 2017 11:00 AM CST   Neuro Treatment with Mary Lou Valentine, PT   [facility], [place], Physical Therapy - Outpatient ([facility])    [address]  [place] [address]   [phone]            Dec 15, 2017  9:30 AM CST   Lab with [facility] ([facility])    [address]  1st [address]  [facility] [id number] [phone]            Dec 15, 2017 10:30 AM CST   (Arrive by  10:00 AM)   New Patient Visit with Chago Lloyd MD   [facility] ([facility])    [address]  3rd [address]  [facility] [address]   [phone]            Dec 21, 2017 11:00 AM CST   Neuro Treatment with Mary Lou Valentine, PT   [facility], [place], Physical Therapy - Outpatient ([facility])    [address]  [place] [address]   [phone]            Dec 28, 2017 11:00 AM CST   Neuro Treatment with Mary Lou Valentine, PT   [facility], [place], Physical Therapy - Outpatient ([facility])    [address]  [place] [address]   [phone]            Jan 04, 2018 11:00 AM CST   Neuro Treatment with Mary Lou Valentine, PT   [facility], [place], Physical Therapy - Outpatient ([facility])    [address]  [place] [address]   [phone]            Jan 11, 2018 11:00 AM CST   Neuro Treatment with Mary Lou Valentine, PT   [facility], [place], Physical Therapy - Outpatient ([facility])    [address]  [place] [address]   [phone]              Further instructions from your care team       Home Care Instructions after a Radio Frequency Ablation    A radiofrequency ablation refers to a procedure that destroys the functionality of the nerve using radiofrequency energy. There are two primary types of radiofrequency ablation: A medial branch neurotomy (ablation) affects the nerves carrying pain from the facet joints, while a lateral branch neurotomy (ablation) affects nerves that carry pain from the sacroiliac joints. The physician uses x-ray guidance (fluoroscopy) to direct a special (radiofrequency) needle alongside the medial or lateral branch nerves. A small amount of electrical current is often carefully passed through the needle to assure it is  next to the target nerve and a safe distance from other nerves. This current should briefly recreate the usual pain and cause a muscle twitch in the neck or back. The targeted nerves will then be numbed to minimize pain while the lesion is being created. The radiofrequency waves are introduced to heat the tip of the needle and a heat lesion is created on the nerve to disrupt the nerve's ability to send pain signals. Please follow the aftercare instructions regarding your procedure.    Activity  -Rest today  -Do not work today  -Resume normal activity in 2-3 days  -No heavy lifting, turning or twisting for 24 hours  -DO NOT shower or soak in tub for 24 hours  -DO NOT remove bandaid for 24 hours    Pain  -You may experience soreness at the injection site for one or two days  -You may use an ice pack for 20 minutes every 2 hours for the first 24 hours, longer if needed for comfort, do not use heat  -You may use Tylenol (acetaminophen) every 4 hours or other pain medicines as     directed by your physician  -If other pain medications are prescribed by your physician, please follow dosing instructions carefully.    What to expect  You may experience numbness radiating into your legs or arms (depending on the procedure location). This numbness may last several hours. Until sensation returns to normal, please use caution in walking, climbing stairs, and stepping out of your vehicle, etc. Relief of your initial symptoms can take up to 4 weeks to feel better, this is normal and due to the healing process. The procedure site may feel like a deep burn. Using ice will greatly minimize this discomfort. Do not use numbing creams or patches over your injection sites immediately following your procedure. Please keep injection sites covered, clean and dry for 24 hours.    DID YOU RECEIVE SEDATION TODAY?  No    Safety  Sedation medicine, if given, may remain active for many hours. It is important for the next 24 hours that you do  "not:  -Drive a car  -Operate machines or power tools  -Consume alcohol, including beer  -Sign any important papers or legal documents  -Please have a responsible adult with you for 24 hours following any sedation    DID YOU RECEIVE STEROIDS TODAY?  Yes    Common side effects of steroids:  Not everyone will experience corticosteroid side effects. If side effects are experienced, they will gradually subside in the 7-10 day period following an injection. Most common side effects include:  -Flushed face and/or chest  -Feeling of warmth, particularly in the face but could be an overall feeling of warmth  -Increased blood sugar in diabetic patients  -Menstrual irregularities my occur. If taking hormone-based birth control an alternate method of birth control is recommended  -Sleep disturbances and/or mood swings are possible  -Leg cramps      Please contact us if you have:  -Severe pain  -Fever more than 101.5 degrees Fahrenheit  -Signs of infection at the injection site (redness, swelling, or drainage)    If you have questions, please contact our office at [phone] between the hours of 7:00 am and 3:00 pm Monday through Friday. After office hours you can contact the on call provider by dialing [phone]. If you need immediate attention, we recommend that you go to a hospital emergency room or dial [phone].      Pending Results     Date and Time Order Name Status Description    12/5/2017 0942 XR SURGERY YEE FLUORO LESS THAN 5 MIN In process             Admission Information     Date & Time Provider Department Dept. Phone    12/5/2017 Fransisco Estrada MD [facility] [phone]      Your Vitals Were     Blood Pressure Temperature Respirations Height Weight Pulse Oximetry    183/87 97.2  F (36.2  C) 16 1.753 m (5' 9\") 77.1 kg (170 lb) 100%    BMI (Body Mass Index)                   25.1 kg/m2           [employer] Information     [employer] is an electronic gateway that provides easy, online access " to your medical records. With [employer], you can request a clinic appointment, read your test results, renew a prescription or communicate with your care team.     To sign up for [employer] visit the website at [email or web address]   You will be asked to enter the access code listed below, as well as some personal information. Please follow the directions to create your username and password.     Your access code is: [email or web address]  Expires: 2018  5:30 AM     Your access code will  in 90 days. If you need help or a new code, please contact your [facility] or call [phone] for assistance.        Care EveryWhere ID     This is your Care EveryWhere ID. This could be used by other organizations to access your [place] medical records  [id number]        Equal Access to Services     JOSSELIN JENNINGS : Carlitos Guy, guerrero thayer, francoise infante, dickson baron. So [facility] [phone].    ATENCIÓN: Si habla español, tiene a julio disposición servicios gratuitos de asistencia lingüística. Ha al [phone].    We comply with applicable federal civil rights laws and [place] laws. We do not discriminate on the basis of race, color, national origin, age, disability, sex, sexual orientation, or gender identity.               Review of your medicines      UNREVIEWED medicines. Ask your doctor about these medicines        Dose / Directions    aspirin 81 MG EC tablet   Used for:  Type 2 diabetes mellitus with diabetic polyneuropathy, with long-term current use of insulin (H)        Dose:  81 mg   Take 1 tablet (81 mg) by mouth daily   Quantity:  90 tablet   Refills:  3       insulin glargine 100 UNIT/ML injection   Commonly known as:  LANTUS   Used for:  Diabetes mellitus, type 2 (H)        Inject 28 units at 10pm daily   Quantity:  30 mL   Refills:  0       lactulose 10 GM/15ML solution   Commonly known as:  CHRONULAC         Dose:  30 g   Take 30 g by mouth   Refills:  0       lisinopril 20 MG tablet   Commonly known as:  PRINIVIL/ZESTRIL   Used for:  Type 2 diabetes mellitus with diabetic polyneuropathy, with long-term current use of insulin (H)        Dose:  20 mg   Take 1 tablet (20 mg) by mouth daily   Quantity:  90 tablet   Refills:  3       meloxicam 7.5 MG tablet   Commonly known as:  MOBIC   Used for:  Facet arthropathy        Dose:  7.5 mg   Take 1 tablet (7.5 mg) by mouth daily   Quantity:  30 tablet   Refills:  1       metFORMIN 500 MG 24 hr tablet   Commonly known as:  GLUCOPHAGE-XR        Dose:  2000 mg   Take 2,000 mg by mouth daily (with breakfast)   Refills:  0       nicotine 21 MG/24HR 24 hr patch   Commonly known as:  NICODERM CQ   Used for:  Cigarette nicotine dependence without complication        Dose:  1 patch   Place 1 patch onto the skin every 24 hours   Quantity:  30 patch   Refills:  3       nortriptyline 25 MG capsule   Commonly known as:  PAMELOR   Used for:  Diabetic polyneuropathy associated with type 2 diabetes mellitus (H)        Dose:  25 mg   Take 1 capsule (25 mg) by mouth At Bedtime   Quantity:  30 capsule   Refills:  1       pregabalin 100 MG capsule   Commonly known as:  LYRICA   Used for:  Type 2 diabetes mellitus with diabetic polyneuropathy, with long-term current use of insulin (H), Spinal stenosis, unspecified spinal region, Chronic back pain, unspecified back location, unspecified back pain laterality        Dose:  100 mg   Take 1 capsule (100 mg) by mouth 3 times daily   Quantity:  90 capsule   Refills:  1       propranolol 20 MG tablet   Commonly known as:  INDERAL        Dose:  20 mg   Take 20 mg by mouth 2 times daily   Refills:  0       psyllium 58.6 % Powd   Commonly known as:  METAMUCIL        Refills:  0       STATIN NOT PRESCRIBED (INTENTIONAL)   Used for:  Type 2 diabetes mellitus with diabetic polyneuropathy, with long-term current use of insulin (H)        Please choose reason not  prescribed, below   Refills:  0       XIFAXAN 550 MG Tabs tablet   Generic drug:  rifaximin        Dose:  200 mg   Take 200 mg by mouth 2 times daily   Refills:  0         CONTINUE these medicines which have NOT CHANGED        Dose / Directions    RAMIREZ CONTOUR NEXT test strip   Generic drug:  blood glucose monitoring        Use  to test three times a day. Use as directed. Pharmacy dispense brand based on insurance.   Refills:  0       MICROLET LANCETS Misc        three times a day.   Refills:  0                Protect others around you: Learn how to safely use, store and throw away your medicines at www.disposemymeds.org.             Medication List: This is a list of all your medications and when to take them. Check marks below indicate your daily home schedule. Keep this list as a reference.      Medications           Morning Afternoon Evening Bedtime As Needed    aspirin 81 MG EC tablet   Take 1 tablet (81 mg) by mouth daily                                RAMIREZ CONTOUR NEXT test strip   Use  to test three times a day. Use as directed. Pharmacy dispense brand based on insurance.   Generic drug:  blood glucose monitoring                                insulin glargine 100 UNIT/ML injection   Commonly known as:  LANTUS   Inject 28 units at 10pm daily                                lactulose 10 GM/15ML solution   Commonly known as:  CHRONULAC   Take 30 g by mouth                                lisinopril 20 MG tablet   Commonly known as:  PRINIVIL/ZESTRIL   Take 1 tablet (20 mg) by mouth daily                                meloxicam 7.5 MG tablet   Commonly known as:  MOBIC   Take 1 tablet (7.5 mg) by mouth daily                                metFORMIN 500 MG 24 hr tablet   Commonly known as:  GLUCOPHAGE-XR   Take 2,000 mg by mouth daily (with breakfast)                                MICROLET LANCETS Misc   three times a day.                                nicotine 21 MG/24HR 24 hr patch   Commonly known as:   NICODERM CQ   Place 1 patch onto the skin every 24 hours                                nortriptyline 25 MG capsule   Commonly known as:  PAMELOR   Take 1 capsule (25 mg) by mouth At Bedtime                                pregabalin 100 MG capsule   Commonly known as:  LYRICA   Take 1 capsule (100 mg) by mouth 3 times daily                                propranolol 20 MG tablet   Commonly known as:  INDERAL   Take 20 mg by mouth 2 times daily                                psyllium 58.6 % Powd   Commonly known as:  METAMUCIL                                STATIN NOT PRESCRIBED (INTENTIONAL)   Please choose reason not prescribed, below                                XIFAXAN 550 MG Tabs tablet   Take 200 mg by mouth 2 times daily   Generic drug:  rifaximin

## 2017-12-05 NOTE — DISCHARGE INSTRUCTIONS
Home Care Instructions after a Radio Frequency Ablation    A radiofrequency ablation refers to a procedure that destroys the functionality of the nerve using radiofrequency energy. There are two primary types of radiofrequency ablation: A medial branch neurotomy (ablation) affects the nerves carrying pain from the facet joints, while a lateral branch neurotomy (ablation) affects nerves that carry pain from the sacroiliac joints. The physician uses x-ray guidance (fluoroscopy) to direct a special (radiofrequency) needle alongside the medial or lateral branch nerves. A small amount of electrical current is often carefully passed through the needle to assure it is next to the target nerve and a safe distance from other nerves. This current should briefly recreate the usual pain and cause a muscle twitch in the neck or back. The targeted nerves will then be numbed to minimize pain while the lesion is being created. The radiofrequency waves are introduced to heat the tip of the needle and a heat lesion is created on the nerve to disrupt the nerve's ability to send pain signals. Please follow the aftercare instructions regarding your procedure.    Activity  -Rest today  -Do not work today  -Resume normal activity in 2-3 days  -No heavy lifting, turning or twisting for 24 hours  -DO NOT shower or soak in tub for 24 hours  -DO NOT remove bandaid for 24 hours    Pain  -You may experience soreness at the injection site for one or two days  -You may use an ice pack for 20 minutes every 2 hours for the first 24 hours, longer if needed for comfort, do not use heat  -You may use Tylenol (acetaminophen) every 4 hours or other pain medicines as     directed by your physician  -If other pain medications are prescribed by your physician, please follow dosing instructions carefully.    What to expect  You may experience numbness radiating into your legs or arms (depending on the procedure location). This numbness may last several  hours. Until sensation returns to normal, please use caution in walking, climbing stairs, and stepping out of your vehicle, etc. Relief of your initial symptoms can take up to 4 weeks to feel better, this is normal and due to the healing process. The procedure site may feel like a deep burn. Using ice will greatly minimize this discomfort. Do not use numbing creams or patches over your injection sites immediately following your procedure. Please keep injection sites covered, clean and dry for 24 hours.    DID YOU RECEIVE SEDATION TODAY?  No    Safety  Sedation medicine, if given, may remain active for many hours. It is important for the next 24 hours that you do not:  -Drive a car  -Operate machines or power tools  -Consume alcohol, including beer  -Sign any important papers or legal documents  -Please have a responsible adult with you for 24 hours following any sedation    DID YOU RECEIVE STEROIDS TODAY?  Yes    Common side effects of steroids:  Not everyone will experience corticosteroid side effects. If side effects are experienced, they will gradually subside in the 7-10 day period following an injection. Most common side effects include:  -Flushed face and/or chest  -Feeling of warmth, particularly in the face but could be an overall feeling of warmth  -Increased blood sugar in diabetic patients  -Menstrual irregularities my occur. If taking hormone-based birth control an alternate method of birth control is recommended  -Sleep disturbances and/or mood swings are possible  -Leg cramps      Please contact us if you have:  -Severe pain  -Fever more than 101.5 degrees Fahrenheit  -Signs of infection at the injection site (redness, swelling, or drainage)    If you have questions, please contact our office at [phone] between the hours of 7:00 am and 3:00 pm Monday through Friday. After office hours you can contact the on call provider by dialing [phone]. If you need immediate attention, we recommend that  you go to a hospital emergency room or dial 911.

## 2017-12-05 NOTE — IP AVS SNAPSHOT
[facility]    [address] [place] [address]    Phone:  [phone]    Fax:  [phone]                                       After Visit Summary   12/5/2017    Javi Celaya    MRN: [id number]           After Visit Summary Signature Page     I have received my discharge instructions, and my questions have been answered. I have discussed any challenges I see with this plan with the nurse or doctor.    ..........................................................................................................................................  Patient/Patient Representative Signature      ..........................................................................................................................................  Patient Representative Print Name and Relationship to Patient    ..................................................               ................................................  Date                                            Time    ..........................................................................................................................................  Reviewed by Signature/Title    ...................................................              ..............................................  Date                                                            Time

## 2017-12-07 ENCOUNTER — HOSPITAL ENCOUNTER (OUTPATIENT)
Dept: PHYSICAL THERAPY | Facility: CLINIC | Age: 67
Setting detail: THERAPIES SERIES
End: 2017-12-07
Attending: INTERNAL MEDICINE
Payer: COMMERCIAL

## 2017-12-07 PROCEDURE — 40000719 ZZHC STATISTIC PT DEPARTMENT NEURO VISIT: Performed by: PHYSICAL THERAPIST

## 2017-12-07 PROCEDURE — 97140 MANUAL THERAPY 1/> REGIONS: CPT | Mod: GP | Performed by: PHYSICAL THERAPIST

## 2017-12-07 PROCEDURE — 97110 THERAPEUTIC EXERCISES: CPT | Mod: GP | Performed by: PHYSICAL THERAPIST

## 2017-12-08 DIAGNOSIS — I10 HTN (HYPERTENSION): ICD-10-CM

## 2017-12-08 RX ORDER — LOSARTAN POTASSIUM 50 MG/1
TABLET ORAL
OUTPATIENT
Start: 2017-12-08

## 2017-12-11 ENCOUNTER — TELEPHONE (OUTPATIENT)
Dept: INTERNAL MEDICINE | Facility: CLINIC | Age: 67
End: 2017-12-11

## 2017-12-11 DIAGNOSIS — H53.9 VISION CHANGES: Primary | ICD-10-CM

## 2017-12-11 NOTE — TELEPHONE ENCOUNTER
Referral done , pt notified.  Provided phone # for pt to schedule.  Keiko Hinojosa RN      ----- Message from Jose Salgado sent at 12/8/2017 11:13 AM CST -----  Regarding: Pt Called Requesting Referral  Contact: [phone]  Pt of Dr. Barron called requesting a referral for an optometrist/opthamologist. Pt said his eye sight is bad, and would like to know who Dr. Barron would recommend to treat him.    Please call pt at [phone]    Thank you,  Aaron  Call Center    Please DO NOT send message and or reply back to sender. Call center Representatives DO NOT respond to Messages.

## 2017-12-12 DIAGNOSIS — R80.9 PROTEINURIA: Primary | ICD-10-CM

## 2017-12-14 ENCOUNTER — HOSPITAL ENCOUNTER (OUTPATIENT)
Dept: PHYSICAL THERAPY | Facility: CLINIC | Age: 67
Setting detail: THERAPIES SERIES
End: 2017-12-14
Attending: INTERNAL MEDICINE
Payer: COMMERCIAL

## 2017-12-14 ENCOUNTER — OFFICE VISIT (OUTPATIENT)
Dept: OPHTHALMOLOGY | Facility: CLINIC | Age: 67
End: 2017-12-14
Payer: COMMERCIAL

## 2017-12-14 DIAGNOSIS — E11.9 TYPE 2 DIABETES MELLITUS WITHOUT COMPLICATION, WITH LONG-TERM CURRENT USE OF INSULIN (H): Primary | ICD-10-CM

## 2017-12-14 DIAGNOSIS — H25.13 NUCLEAR SENILE CATARACT OF BOTH EYES: ICD-10-CM

## 2017-12-14 DIAGNOSIS — H04.123 INSUFFICIENCY OF TEAR FILM OF BOTH EYES: ICD-10-CM

## 2017-12-14 DIAGNOSIS — H26.8 PSEUDOEXFOLIATION (PXF) OF LENS CAPSULE: ICD-10-CM

## 2017-12-14 DIAGNOSIS — Z79.4 TYPE 2 DIABETES MELLITUS WITHOUT COMPLICATION, WITH LONG-TERM CURRENT USE OF INSULIN (H): Primary | ICD-10-CM

## 2017-12-14 DIAGNOSIS — R80.9 PROTEINURIA: ICD-10-CM

## 2017-12-14 LAB
ALBUMIN SERPL-MCNC: 2.9 G/DL (ref 3.4–5)
ALBUMIN UR-MCNC: 100 MG/DL
ANION GAP SERPL CALCULATED.3IONS-SCNC: 4 MMOL/L (ref 3–14)
APPEARANCE UR: ABNORMAL
BACTERIA #/AREA URNS HPF: ABNORMAL /HPF
BILIRUB UR QL STRIP: NEGATIVE
BUN SERPL-MCNC: 14 MG/DL (ref 7–30)
CALCIUM SERPL-MCNC: 9.1 MG/DL (ref 8.5–10.1)
CHLORIDE SERPL-SCNC: 108 MMOL/L (ref 94–109)
CO2 SERPL-SCNC: 30 MMOL/L (ref 20–32)
COLOR UR AUTO: YELLOW
CREAT SERPL-MCNC: 1.1 MG/DL (ref 0.66–1.25)
CREAT UR-MCNC: 98 MG/DL
ERYTHROCYTE [DISTWIDTH] IN BLOOD BY AUTOMATED COUNT: 13.2 % (ref 10–15)
FERRITIN SERPL-MCNC: 149 NG/ML (ref 26–388)
GFR SERPL CREATININE-BSD FRML MDRD: 67 ML/MIN/1.7M2
GLUCOSE SERPL-MCNC: 126 MG/DL (ref 70–99)
GLUCOSE UR STRIP-MCNC: NEGATIVE MG/DL
HCT VFR BLD AUTO: 41 % (ref 40–53)
HGB BLD-MCNC: 14.1 G/DL (ref 13.3–17.7)
HGB UR QL STRIP: ABNORMAL
IRON SATN MFR SERPL: 30 % (ref 15–46)
IRON SERPL-MCNC: 80 UG/DL (ref 35–180)
KETONES UR STRIP-MCNC: NEGATIVE MG/DL
LEUKOCYTE ESTERASE UR QL STRIP: ABNORMAL
MCH RBC QN AUTO: 31.4 PG (ref 26.5–33)
MCHC RBC AUTO-ENTMCNC: 34.4 G/DL (ref 31.5–36.5)
MCV RBC AUTO: 91 FL (ref 78–100)
MUCOUS THREADS #/AREA URNS LPF: PRESENT /LPF
NITRATE UR QL: POSITIVE
PH UR STRIP: 5 PH (ref 5–7)
PHOSPHATE SERPL-MCNC: 3.4 MG/DL (ref 2.5–4.5)
PLATELET # BLD AUTO: 112 10E9/L (ref 150–450)
POTASSIUM SERPL-SCNC: 4.1 MMOL/L (ref 3.4–5.3)
PROT UR-MCNC: 1.28 G/L
PROT/CREAT 24H UR: 1.31 G/G CR (ref 0–0.2)
PTH-INTACT SERPL-MCNC: 16 PG/ML (ref 12–72)
RBC # BLD AUTO: 4.49 10E12/L (ref 4.4–5.9)
RBC #/AREA URNS AUTO: 7 /HPF (ref 0–2)
SODIUM SERPL-SCNC: 142 MMOL/L (ref 133–144)
SOURCE: ABNORMAL
SP GR UR STRIP: 1.01 (ref 1–1.03)
SQUAMOUS #/AREA URNS AUTO: <1 /HPF (ref 0–1)
TIBC SERPL-MCNC: 267 UG/DL (ref 240–430)
UROBILINOGEN UR STRIP-MCNC: 4 MG/DL (ref 0–2)
WBC # BLD AUTO: 5.6 10E9/L (ref 4–11)
WBC #/AREA URNS AUTO: 104 /HPF (ref 0–2)
WBC CLUMPS #/AREA URNS HPF: PRESENT /HPF

## 2017-12-14 PROCEDURE — 40000719 ZZHC STATISTIC PT DEPARTMENT NEURO VISIT: Performed by: PHYSICAL THERAPIST

## 2017-12-14 PROCEDURE — 97110 THERAPEUTIC EXERCISES: CPT | Mod: GP | Performed by: PHYSICAL THERAPIST

## 2017-12-14 ASSESSMENT — CUP TO DISC RATIO
OS_RATIO: 0.3
OD_RATIO: 0.25

## 2017-12-14 ASSESSMENT — REFRACTION_WEARINGRX
OS_CYLINDER: +2.25
OD_ADD: +2.75
OS_SPHERE: -0.75
OD_CYLINDER: +1.50
OS_AXIS: 175
OD_AXIS: 005
OS_ADD: +2.75
OD_SPHERE: -0.25

## 2017-12-14 ASSESSMENT — VISUAL ACUITY
OS_CC: 20/25
CORRECTION_TYPE: GLASSES
OS_CC: J1+
OD_CC+: -1
OS_CC+: -1
METHOD: SNELLEN - LINEAR
OD_CC: 20/20
OD_CC: J1+

## 2017-12-14 ASSESSMENT — TONOMETRY
OS_IOP_MMHG: 14
IOP_METHOD: TONOPEN
OD_IOP_MMHG: 12

## 2017-12-14 ASSESSMENT — SLIT LAMP EXAM - LIDS
COMMENTS: NORMAL
COMMENTS: NORMAL

## 2017-12-14 ASSESSMENT — CONF VISUAL FIELD
OS_NORMAL: 1
METHOD: COUNTING FINGERS
OD_NORMAL: 1

## 2017-12-14 ASSESSMENT — REFRACTION_MANIFEST
OS_CYLINDER: +1.75
OS_SPHERE: -0.75
OD_ADD: +2.75
OD_AXIS: 180
OD_CYLINDER: +1.75
OD_SPHERE: -0.25
OS_ADD: +2.75
OS_AXIS: 176

## 2017-12-14 ASSESSMENT — EXTERNAL EXAM - LEFT EYE: OS_EXAM: NORMAL

## 2017-12-14 ASSESSMENT — EXTERNAL EXAM - RIGHT EYE: OD_EXAM: NORMAL

## 2017-12-14 NOTE — PROGRESS NOTES
Annual exam  Diabetic under good control A1c 5.7 in November  Vision hasn't been as sharp with glasses over the past year  Denies pain, new floaters, flashing lights, dryness or irritation.    OcHx: none    Gtts: none    A/P:  1. DMII   No retinopathy  Good diet, exercise, blood glucose and blood pressure control    2. Cataracts OU  Not visually significant  Observe    3. Dry eye both eyes  Mild  ATs as needed    4. Pseudoexfoliation OS  IOP good  No optic nerve changes  Monitor  Discussed risk of glaucoma and challenge with cataract surgery    MRx given today    F/u 1 year    Complete documentation of historical and exam elements from today's encounter can be found in the full encounter summary report (not reduplicated in this progress note). I personally obtained the chief complaint(s) and history of present illness.  I confirmed and edited as necessary the review of systems, past medical/surgical history, family history, social history, and examination findings as documented by others; and I examined the patient myself. I personally reviewed the relevant tests, images, and reports as documented above. I formulated and edited as necessary the assessment and plan and discussed the findings and management plan with the patient and family.     Rosales Perez, DO

## 2017-12-14 NOTE — NURSING NOTE
Chief Complaints and History of Present Illnesses   Patient presents with     Annual Eye Exam     HPI    Affected eye(s):  Both   Symptoms:     Blurred vision   Tearing      Frequency:  Constant       Do you have eye pain now?:  No      Comments:  Tearing when putting on glasses, then blurry when removing glasses. No eye pain, no eye drops currently, has uses in the past.      , 80 in AM  Lab Results       Component                Value               Date                       A1C                      5.7                 11/14/2017                 A1C                      6.0                 04/26/2017              Teresita Arboleda COT 4:43 PM December 14, 2017

## 2017-12-14 NOTE — MR AVS SNAPSHOT
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      Your next 10 appointments already scheduled     Dec 15, 2017  9:30 AM CST   Lab with  LAB   [facility] ([facility])    [address]  1st Floor  [facility] [address] [phone]            Dec 15, 2017 10:30 AM CST   (Arrive by 10:00 AM)   New Patient Visit with Chago Lloyd MD   [facility] Nephrology ([facility])    [address] [place] [address]  3rd Floor  [facility] [id number] [phone]            Dec 21, 2017 11:00 AM CST   Neuro Treatment with Mary Lou Valentine, PT   [facility], [place], Physical Therapy - Outpatient ([facility])    [address]  [place] [address]   [phone]            Dec 28, 2017 11:00 AM CST   Neuro Treatment with Mary Lou Valentine, PT   [facility] [place], Physical Therapy - Outpatient ([facility])    [address]  [place] [address]   [phone]            Jan 04, 2018 11:00 AM CST   Neuro Treatment with Mary Lou Valentine, PT   [facility] [place], Physical Therapy - Outpatient ([facility])    [address]  [place] [address]   [phone]            Jan 11, 2018 11:00 AM CST    Neuro Treatment with Mary Lou Valentine PT   [facility], [place], Physical Therapy - Outpatient ([facility])    [address]  [place] [address]   [phone]              Who to contact     Please call your clinic at [phone] to:    Ask questions about your health    Make or cancel appointments    Discuss your medicines    Learn about your test results    Speak to your doctor   If you have compliments or concerns about an experience at your clinic, or if you wish to file a complaint, please contact [facility] Patient Relations at [phone] or email us at [email or web address]         Additional Information About Your Visit        Last SizeharTealium Information     [employer] is an electronic gateway that provides easy, online access to your medical records. With [employer], you can request a clinic appointment, read your test results, renew a prescription or communicate with your care team.     To sign up for [employer] visit the website at [email or web address]   You will be asked to enter the access code listed below, as well as some personal information. Please follow the directions to create your username and password.     Your access code is: [email or web address]  Expires: 2018  5:30 AM     Your access code will  in 90 days. If you need help or a new code, please contact your [facility] Clinic or call [phone] for assistance.        Care EveryWhere ID     This is your Care EveryWhere ID. This could be used by other organizations to access your [place] medical records  [id number]         Blood Pressure from Last 3 Encounters:   17 (!) 182/99   17 171/81   17 138/78    Weight from Last 3 Encounters:   17 77.1 kg (170 lb)   17 78.5 kg (173 lb)   10/12/17 78.5 kg (173 lb)              Today, you had the following     No orders found for display       Primary Care  Provider Office Phone # Fax #    Carolynn Sariah Reardon [phone]       [address] [place] [address] 741  [facility] [address]        Equal Access to Services     JOSSELIN JENNINGS : Hadii jake pyle emeliao Sofrancoiseali, waaxda luqadaha, qaybta kaalmada adehoang, dickson martinez laAhsanyary baron. So [facility] [phone].    ATENCIÓN: Si habla español, tiene a julio disposición servicios gratuitos de asistencia lingüística. Llame al [phone].    We comply with applicable federal civil rights laws and [place] laws. We do not discriminate on the basis of race, color, national origin, age, disability, sex, sexual orientation, or gender identity.            Thank you!     Thank you for choosing [facility] OPHTH[facility]  for your care. Our goal is always to provide you with excellent care. Hearing back from our patients is one way we can continue to improve our services. Please take a few minutes to complete the written survey that you may receive in the mail after your visit with us. Thank you!             Your Updated Medication List - Protect others around you: Learn how to safely use, store and throw away your medicines at www.disposemymeds.org.          This list is accurate as of: 12/14/17 11:59 PM.  Always use your most recent med list.                   Brand Name Dispense Instructions for use Diagnosis    aspirin 81 MG EC tablet     90 tablet    Take 1 tablet (81 mg) by mouth daily    Type 2 diabetes mellitus with diabetic polyneuropathy, with long-term current use of insulin (H)       RAMIREZ CONTOUR NEXT test strip   Generic drug:  blood glucose monitoring      Use  to test three times a day. Use as directed. Pharmacy dispense brand based on insurance.        insulin glargine 100 UNIT/ML injection    LANTUS    30 mL    Inject 28 units at 10pm daily    Diabetes mellitus, type 2 (H)       lactulose 10 GM/15ML solution    CHRONULAC     Take 30 g by mouth        lisinopril 20 MG tablet     PRINIVIL/ZESTRIL    90 tablet    Take 1 tablet (20 mg) by mouth daily    Type 2 diabetes mellitus with diabetic polyneuropathy, with long-term current use of insulin (H)       meloxicam 7.5 MG tablet    MOBIC    30 tablet    Take 1 tablet (7.5 mg) by mouth daily    Facet arthropathy       metFORMIN 500 MG 24 hr tablet    GLUCOPHAGE-XR     Take 2,000 mg by mouth daily (with breakfast)        MICROLET LANCETS Misc      three times a day.        nicotine 21 MG/24HR 24 hr patch    NICODERM CQ    30 patch    Place 1 patch onto the skin every 24 hours    Cigarette nicotine dependence without complication       nortriptyline 25 MG capsule    PAMELOR    30 capsule    Take 1 capsule (25 mg) by mouth At Bedtime    Diabetic polyneuropathy associated with type 2 diabetes mellitus (H)       pregabalin 100 MG capsule    LYRICA    90 capsule    Take 1 capsule (100 mg) by mouth 3 times daily    Type 2 diabetes mellitus with diabetic polyneuropathy, with long-term current use of insulin (H), Spinal stenosis, unspecified spinal region, Chronic back pain, unspecified back location, unspecified back pain laterality       propranolol 20 MG tablet    INDERAL     Take 20 mg by mouth 2 times daily        psyllium 58.6 % Powd    METAMUCIL          STATIN NOT PRESCRIBED (INTENTIONAL)      Please choose reason not prescribed, below    Type 2 diabetes mellitus with diabetic polyneuropathy, with long-term current use of insulin (H)       XIFAXAN 550 MG Tabs tablet   Generic drug:  rifaximin      Take 200 mg by mouth 2 times daily

## 2017-12-15 LAB — DEPRECATED CALCIDIOL+CALCIFEROL SERPL-MC: 25 UG/L (ref 20–75)

## 2017-12-21 ENCOUNTER — HOSPITAL ENCOUNTER (OUTPATIENT)
Dept: PHYSICAL THERAPY | Facility: CLINIC | Age: 67
Setting detail: THERAPIES SERIES
End: 2017-12-21
Attending: INTERNAL MEDICINE
Payer: COMMERCIAL

## 2017-12-21 DIAGNOSIS — R26.2 DIFFICULTY WALKING: Primary | ICD-10-CM

## 2017-12-21 PROCEDURE — 40000719 ZZHC STATISTIC PT DEPARTMENT NEURO VISIT: Performed by: PHYSICAL THERAPIST

## 2017-12-21 PROCEDURE — 97110 THERAPEUTIC EXERCISES: CPT | Mod: GP | Performed by: PHYSICAL THERAPIST

## 2017-12-21 PROCEDURE — 97116 GAIT TRAINING THERAPY: CPT | Mod: GP | Performed by: PHYSICAL THERAPIST

## 2017-12-23 NOTE — PROCEDURES
Posterior Primary Ramus Radiofrequency Denervation    The patient s identity, the procedure to be performed and the specific site of the procedure was verified in accordance with [facility] Protocol.     Levels Blocked:  bilateral L4,5, ALA  Pre-Procedure Pain Score: 9/10  Procedure Note:  Informed consent was obtained.  The patient was positioned comfortably in the prone position.  There was no evidence of infection at the site of needle insertion.  The patient was prepped and draped in a sterile fashion.  Skeletal landmarks were identified under fluoroscopic guidance.  At all insertion sites the skin were anesthetized with 1% lidocaine.  Standard insulated radiofrequency probe needles were inserted at the sites indicated.  Proper placement was determined both fluoroscopically and with test stimulation at each primary site with 50hz and 2hz stimulation.  No radicular stimulation was identified and no distal motor activity was noted. Radiofrequency denervation was performed at each site for 135 seconds at 90 degrees Centigrade. The patient was then observed for 30 minutes.  No complications were noted. The patient tolerated the procedure well and was released with post-procedure instructions. The patient was given discharge instructions and verbalizes understanding, including understanding of those signs and symptoms that would require emergency care.      Medications injected at each site post radio frequency denervation:  1ml from a 10ml mixture 0.25% Marcaine and with 40 mg Methylprednisolone    Intravenous line placed no.  Standard non-invasive monitors placed yes.    The patient was given discharge instructions and verbalizes understanding, including understanding of those signs and symptoms that would require emergency care.   Post-Procedure Pain Score:0/10  Counseling: Greater than 50% of this patient visit was spent in counseling the patient regarding the treatment of their pain,  coordinating their overall treatment plan and assessing their progress.

## 2018-01-04 ENCOUNTER — HOSPITAL ENCOUNTER (OUTPATIENT)
Dept: PHYSICAL THERAPY | Facility: CLINIC | Age: 68
Setting detail: THERAPIES SERIES
End: 2018-01-04
Attending: INTERNAL MEDICINE
Payer: COMMERCIAL

## 2018-01-04 ENCOUNTER — CARE COORDINATION (OUTPATIENT)
Dept: ANESTHESIOLOGY | Facility: CLINIC | Age: 68
End: 2018-01-04

## 2018-01-04 PROCEDURE — 40000719 ZZHC STATISTIC PT DEPARTMENT NEURO VISIT: Performed by: PHYSICAL THERAPIST

## 2018-01-04 PROCEDURE — 97110 THERAPEUTIC EXERCISES: CPT | Mod: GP | Performed by: PHYSICAL THERAPIST

## 2018-01-04 PROCEDURE — 97116 GAIT TRAINING THERAPY: CPT | Mod: GP | Performed by: PHYSICAL THERAPIST

## 2018-01-04 NOTE — IP AVS SNAPSHOT
"                  MRN:[id number]                      After Visit Summary   2018    Javi Celaya    MRN: [id number]           Visit Information        Provider Department      2018 11:00 AM Mary Lou Valentine, PT [facility], [place], Physical Therapy - Outpatient        Your next 10 appointments already scheduled     2018 11:00 AM CST   Neuro Treatment with Mary Lou Valentine, PT   [facility] [place], Physical Therapy - Outpatient ([facility])    [address]  [place] [address]   [phone]                Further instructions from your care team       18    [facility] athletic club on [address]   [phone]    [employer] [address] .  [phone]  And  *******Another  [employer]:   Address: [address] [place] [address], [place] [address] Phone: [phone]  Call them.  See if they take your insurance.      Hope you can go to one of these places.      Do the blue foam block for hands.  Use the red band for arm strength      See you next time.  Mary Lou  PT  [phone]    MyChart Information     [employer] lets you send messages to your doctor, view your test results, renew your prescriptions, schedule appointments and more. To sign up, go to [email or web address] . Click on \"Log in\" on the left side of the screen, which will take you to the Welcome page. Then click on \"Sign up Now\" on the right side of the page.     You will be asked to enter the access code listed below, as well as some personal information. Please follow the directions to create your username and password.     Your access code is: [email or web address]  Expires: 2018  5:30 AM     Your access code will  in 90 days. If you need help or a new code, please call your [facility] clinic or [phone].        Care EveryWhere ID     This is your Care EveryWhere ID. This could be used by other organizations to access your [facility] medical records  [id number]      "   Equal Access to Services     JOSSELIN JENNINGS : Carlitos Guy, wavladimir thayer, qadickson nolan. So [facility] [phone].    ATENCIÓN: Si habla español, tiene a julio disposición servicios gratuitos de asistencia lingüística. Llame al [phone].    We comply with applicable federal civil rights laws and [place] laws. We do not discriminate on the basis of race, color, national origin, age, disability, sex, sexual orientation, or gender identity.

## 2018-01-04 NOTE — DISCHARGE INSTRUCTIONS
1/4/18    [facility] athletic club on [address]   [phone]    [employer] [address] .  [phone]  And  *******Another  [employer]:   Address: [address] [place] [address]  [place] [address], [place] [address] Phone: [phone]  Call them.  See if they take your insurance.      Hope you can go to one of these places.      Do the blue foam block for hands.  Use the red band for arm strength      See you next time.  Mayr Lou  PT  [phone]

## 2018-01-04 NOTE — PROGRESS NOTES
LPN called pt to follow up after procedure:    Procedure Performed: Bilateral Lumbar RFA  Doctor: Natalie  Diagnosis: Low back pain  Date of Procedure: 12/05/17     LPN attempted to reach pt 3 times and left voicemails for pt to return phone call.    Rose Shaver LPN

## 2018-01-11 ENCOUNTER — HOSPITAL ENCOUNTER (OUTPATIENT)
Dept: PHYSICAL THERAPY | Facility: CLINIC | Age: 68
Setting detail: THERAPIES SERIES
End: 2018-01-11
Attending: INTERNAL MEDICINE
Payer: COMMERCIAL

## 2018-01-11 PROCEDURE — 97116 GAIT TRAINING THERAPY: CPT | Mod: GP | Performed by: PHYSICAL THERAPIST

## 2018-01-11 PROCEDURE — 40000719 ZZHC STATISTIC PT DEPARTMENT NEURO VISIT: Performed by: PHYSICAL THERAPIST

## 2018-01-11 PROCEDURE — 97530 THERAPEUTIC ACTIVITIES: CPT | Mod: GP | Performed by: PHYSICAL THERAPIST

## 2018-01-12 ENCOUNTER — RECORDS - HEALTHEAST (OUTPATIENT)
Dept: LAB | Facility: CLINIC | Age: 68
End: 2018-01-12

## 2018-01-12 LAB
ALBUMIN SERPL-MCNC: 2.8 G/DL (ref 3.5–5)
ALP SERPL-CCNC: 106 U/L (ref 45–120)
ALT SERPL W P-5'-P-CCNC: 9 U/L (ref 0–45)
ANION GAP SERPL CALCULATED.3IONS-SCNC: 7 MMOL/L (ref 5–18)
AST SERPL W P-5'-P-CCNC: 18 U/L (ref 0–40)
BILIRUB SERPL-MCNC: 0.8 MG/DL (ref 0–1)
BUN SERPL-MCNC: 13 MG/DL (ref 8–22)
CALCIUM SERPL-MCNC: 9.5 MG/DL (ref 8.5–10.5)
CHLORIDE BLD-SCNC: 113 MMOL/L (ref 98–107)
CHOLEST SERPL-MCNC: 164 MG/DL
CO2 SERPL-SCNC: 24 MMOL/L (ref 22–31)
CREAT SERPL-MCNC: 1.18 MG/DL (ref 0.7–1.3)
FASTING STATUS PATIENT QL REPORTED: NO
GFR SERPL CREATININE-BSD FRML MDRD: >60 ML/MIN/1.73M2
GLUCOSE BLD-MCNC: 132 MG/DL (ref 70–125)
HDLC SERPL-MCNC: 55 MG/DL
LDLC SERPL CALC-MCNC: 93 MG/DL
POTASSIUM BLD-SCNC: 4.2 MMOL/L (ref 3.5–5)
PROT SERPL-MCNC: 6.4 G/DL (ref 6–8)
SODIUM SERPL-SCNC: 144 MMOL/L (ref 136–145)
TRIGL SERPL-MCNC: 79 MG/DL

## 2018-01-16 DIAGNOSIS — Z79.4 TYPE 2 DIABETES MELLITUS WITH DIABETIC POLYNEUROPATHY, WITH LONG-TERM CURRENT USE OF INSULIN (H): ICD-10-CM

## 2018-01-16 DIAGNOSIS — M54.9 CHRONIC BACK PAIN, UNSPECIFIED BACK LOCATION, UNSPECIFIED BACK PAIN LATERALITY: ICD-10-CM

## 2018-01-16 DIAGNOSIS — E11.42 TYPE 2 DIABETES MELLITUS WITH DIABETIC POLYNEUROPATHY, WITH LONG-TERM CURRENT USE OF INSULIN (H): ICD-10-CM

## 2018-01-16 DIAGNOSIS — G89.29 CHRONIC BACK PAIN, UNSPECIFIED BACK LOCATION, UNSPECIFIED BACK PAIN LATERALITY: ICD-10-CM

## 2018-01-16 DIAGNOSIS — M48.00 SPINAL STENOSIS, UNSPECIFIED SPINAL REGION: ICD-10-CM

## 2018-01-17 NOTE — TELEPHONE ENCOUNTER
pregabalin (LYRICA)  Last Written Prescription Date:  11/20/17  Last Fill Quantity: 90,   # refills: 1  Last Office Visit : 11/14/17  Future Office visit: none    Routing refill request to provider for review/approval because:  controlled      ---------------  Rx faxed to the pharmacy.  Keiko Hinojosa RN

## 2018-01-18 RX ORDER — PREGABALIN 100 MG/1
100 CAPSULE ORAL 3 TIMES DAILY
Qty: 90 CAPSULE | Refills: 1 | Status: SHIPPED | OUTPATIENT
Start: 2018-01-18 | End: 2018-08-21

## 2018-01-19 DIAGNOSIS — E11.9 DIABETES MELLITUS, TYPE 2 (H): ICD-10-CM

## 2018-01-19 RX ORDER — INSULIN GLARGINE 100 [IU]/ML
INJECTION, SOLUTION SUBCUTANEOUS
Qty: 30 ML | Refills: 0 | Status: SHIPPED | OUTPATIENT
Start: 2018-01-19 | End: 2019-06-21

## 2018-01-19 NOTE — TELEPHONE ENCOUNTER
amirah  Last Written Prescription Date:  9/20/17  Last Fill Quantity: 30,   # refills: 0  Last Office Visit : 11/14/17  Future Office visit:  No future appt  BP Readings from Last 3 Encounters:   12/05/17 (!) 182/99   11/14/17 171/81   11/06/17 138/78     Routed to RN due to med warning override needed

## 2018-01-29 ENCOUNTER — RECORDS - HEALTHEAST (OUTPATIENT)
Dept: LAB | Facility: CLINIC | Age: 68
End: 2018-01-29

## 2018-01-29 LAB
CREAT UR-MCNC: 66.6 MG/DL
MICROALBUMIN UR-MCNC: 46.98 MG/DL (ref 0–1.99)
MICROALBUMIN/CREAT UR: 705.4 MG/G

## 2018-02-01 ENCOUNTER — AMBULATORY - HEALTHEAST (OUTPATIENT)
Dept: VASCULAR SURGERY | Facility: CLINIC | Age: 68
End: 2018-02-01

## 2018-02-01 DIAGNOSIS — L89.90 PRESSURE ULCER: ICD-10-CM

## 2018-02-03 ENCOUNTER — MEDICAL CORRESPONDENCE (OUTPATIENT)
Dept: HEALTH INFORMATION MANAGEMENT | Facility: CLINIC | Age: 68
End: 2018-02-03

## 2018-02-05 ENCOUNTER — RECORDS - HEALTHEAST (OUTPATIENT)
Dept: LAB | Facility: CLINIC | Age: 68
End: 2018-02-05

## 2018-02-07 ENCOUNTER — TELEPHONE (OUTPATIENT)
Dept: UROLOGY | Facility: CLINIC | Age: 68
End: 2018-02-07

## 2018-02-07 LAB — BACTERIA SPEC CULT: ABNORMAL

## 2018-02-07 NOTE — TELEPHONE ENCOUNTER
----- Message from Lyssa Terrazas sent at 2/6/2018  4:15 PM CST -----  Regarding: self cath  Adeline from [facility] called. Pt has decided they want to self cath. [facility] is requesting a call back in what is needed for the pt to do that. They can be reached at [phone].    Thank you,    Lyssa    Please DO NOT send this message and/or reply back to sender. Call Center Representatives DO NOT respond to messages.

## 2018-02-15 ENCOUNTER — TELEPHONE (OUTPATIENT)
Dept: ANESTHESIOLOGY | Facility: CLINIC | Age: 68
End: 2018-02-15

## 2018-02-15 NOTE — TELEPHONE ENCOUNTER
----- Message from Ramona Rebolledo RN sent at 2/13/2018 12:42 PM CST -----  Regarding: FW: Post Procedure FU  Contact: [phone]      ----- Message -----     From: Michelle Patton     Sent: 2/13/2018  12:29 PM       To: Adult Chronic Pain Nurses-[facility]  Subject: Post Procedure FU                                Returning Lynne's call: Post Procedure still has pain bothering him on his right side.  Especially in the morning.  Please give him a call to follow up [phone].

## 2018-02-15 NOTE — TELEPHONE ENCOUNTER
LPN returned patient's phone call. VM left for them asking that they call back to schedule a follow up appointment in clinic with Dr. Estrada or the Nurse Practitioner, Cyndi Kendrick, for an evaluation.     Clinic phone number provided.     Rose Shaver LPN

## 2018-02-20 ENCOUNTER — AMBULATORY - HEALTHEAST (OUTPATIENT)
Dept: VASCULAR SURGERY | Facility: CLINIC | Age: 68
End: 2018-02-20

## 2018-02-21 ENCOUNTER — RECORDS - HEALTHEAST (OUTPATIENT)
Dept: ADMINISTRATIVE | Facility: OTHER | Age: 68
End: 2018-02-21

## 2018-02-21 ENCOUNTER — OFFICE VISIT - HEALTHEAST (OUTPATIENT)
Dept: VASCULAR SURGERY | Facility: CLINIC | Age: 68
End: 2018-02-21

## 2018-02-21 DIAGNOSIS — L89.159 DECUBITUS ULCER OF COCCYX, UNSPECIFIED PRESSURE ULCER STAGE: ICD-10-CM

## 2018-02-23 ENCOUNTER — ALLIED HEALTH/NURSE VISIT (OUTPATIENT)
Dept: UROLOGY | Facility: CLINIC | Age: 68
End: 2018-02-23
Payer: COMMERCIAL

## 2018-02-23 DIAGNOSIS — N31.9 NEUROGENIC BLADDER: Primary | ICD-10-CM

## 2018-02-23 NOTE — MR AVS SNAPSHOT
After Visit Summary   2018    Javi Celaya    MRN: [id number]           Patient Information     Date Of Birth          1950        Visit Information        Provider Department      2018 1:00 PM Nurse, [facility]        Care Instructions    Catheterize every 4-6 hours or as needed.      Step-by-Step:  Inserting a Reusable Catheter (Man)    Date Last Reviewed: 10/1/2016    [id number] [employer]. [address] [place] [address]. All rights reserved. This information is not intended as a substitute for professional medical care. Always follow your healthcare professional's instructions.                Follow-ups after your visit        Your next 10 appointments already scheduled     Mar 13, 2018 10:40 AM CDT   (Arrive by 10:25 AM)   Return Visit with Fransisco Estrada MD   [facility] ([facility])    [address] [place] [id number] [phone]              Who to contact     Please call your clinic at [phone] to:    Ask questions about your health    Make or cancel appointments    Discuss your medicines    Learn about your test results    Speak to your doctor            Additional Information About Your Visit        MyChart Information     Nextinithart is an electronic gateway that provides easy, online access to your medical records. With theAudiencet, you can request a clinic appointment, read your test results, renew a prescription or communicate with your care team.     To sign up for Nextinithart visit the website at [email or web address]   You will be asked to enter the access code listed below, as well as some personal information. Please follow the directions to create your username and password.     Your access code is: [email or web address]  Expires: 2018  6:30 AM     Your access code will  in 90  days. If you need help or a new code, please contact your [facility] or call [phone] for assistance.        Care EveryWhere ID     This is your Care EveryWhere ID. This could be used by other organizations to access your [facility] medical records  [id number]         Blood Pressure from Last 3 Encounters:   12/05/17 (!) 182/99   11/14/17 171/81   11/06/17 138/78    Weight from Last 3 Encounters:   12/05/17 77.1 kg (170 lb)   11/06/17 78.5 kg (173 lb)   10/12/17 78.5 kg (173 lb)              Today, you had the following     No orders found for display       Primary Care Provider Office Phone # Fax #    Carolynn Sariah Reardon [phone]       [address] [place] [address] [phone]  [facility] [address]        Equal Access to Services     [facility]: Hadii aad ku hadasho Soomaali, waaxda luqadaha, qaybta kaalmada adeegyada, waxay kipin hayaan florian leyva . So [facility] [phone].    ATENCIÓN: Si habla español, tiene a julio disposición servicios gratuitos de asistencia lingüística. Llame al [phone].    We comply with applicable federal civil rights laws and [place] laws. We do not discriminate on the basis of race, color, national origin, age, disability, sex, sexual orientation, or gender identity.            Thank you!     Thank you for choosing [facility]  for your care. Our goal is always to provide you with excellent care. Hearing back from our patients is one way we can continue to improve our services. Please take a few minutes to complete the written survey that you may receive in the mail after your visit with us. Thank you!             Your Updated Medication List - Protect others around you: Learn how to safely use, store and throw away your medicines at www.disposemymeds.org.          This list is accurate as of 2/23/18  1:28 PM.  Always use your most recent med list.                   Brand Name  Dispense Instructions for use Diagnosis    aspirin 81 MG EC tablet     90 tablet    Take 1 tablet (81 mg) by mouth daily    Type 2 diabetes mellitus with diabetic polyneuropathy, with long-term current use of insulin (H)       RAMIREZ CONTOUR NEXT test strip   Generic drug:  blood glucose monitoring      Use  to test three times a day. Use as directed. Pharmacy dispense brand based on insurance.        lactulose 10 GM/15ML solution    CHRONULAC     Take 30 g by mouth        LANTUS SOLOSTAR 100 UNIT/ML injection   Generic drug:  insulin glargine     30 mL    Inject 28 units at 10pm daily    Diabetes mellitus, type 2 (H)       lisinopril 20 MG tablet    PRINIVIL/ZESTRIL    90 tablet    Take 1 tablet (20 mg) by mouth daily    Type 2 diabetes mellitus with diabetic polyneuropathy, with long-term current use of insulin (H)       meloxicam 7.5 MG tablet    MOBIC    30 tablet    Take 1 tablet (7.5 mg) by mouth daily    Facet arthropathy       metFORMIN 500 MG 24 hr tablet    GLUCOPHAGE-XR     Take 2,000 mg by mouth daily (with breakfast)        MICROLET LANCETS Misc      three times a day.        nicotine 21 MG/24HR 24 hr patch    NICODERM CQ    30 patch    Place 1 patch onto the skin every 24 hours    Cigarette nicotine dependence without complication       nortriptyline 25 MG capsule    PAMELOR    30 capsule    Take 1 capsule (25 mg) by mouth At Bedtime    Diabetic polyneuropathy associated with type 2 diabetes mellitus (H)       order for DME     1 Device    Equipment being ordered: 1 pair walk easy forearm crutches    Difficulty walking       pregabalin 100 MG capsule    LYRICA    90 capsule    Take 1 capsule (100 mg) by mouth 3 times daily    Type 2 diabetes mellitus with diabetic polyneuropathy, with long-term current use of insulin (H), Spinal stenosis, unspecified spinal region, Chronic back pain, unspecified back location, unspecified back pain laterality       propranolol 20 MG tablet    INDERAL     Take 20 mg by  mouth 2 times daily        psyllium 58.6 % Powd    METAMUCIL          STATIN NOT PRESCRIBED (INTENTIONAL)      Please choose reason not prescribed, below    Type 2 diabetes mellitus with diabetic polyneuropathy, with long-term current use of insulin (H)       XIFAXAN 550 MG Tabs tablet   Generic drug:  rifaximin      Take 200 mg by mouth 2 times daily

## 2018-02-23 NOTE — PROGRESS NOTES
Javi Celaya comes into clinic today at the request of Dr. Em Ordering Provider for Pt Teaching CIC.    Following clinic protocol I instructed Javi Celaya in CIC. Javi was able to demonstrate good hand hygiene and genital hygiene. He was able to self catheterize without difficulty using a 14 Fr straight tip catheter.     Pt given a two day supply of catheters and instructed to self-catheterize every 4-6 hours or as often as is needed.    This service provided today was under the supervising provider of the day Dr. Kendrick, who was available if needed.    Catrachita SETHI

## 2018-02-23 NOTE — PROGRESS NOTES
[facility]    Once completed please fax to [phone] or [phone]    A. Please include patient demographics and chart notes    Name: Javi Celaya   : 1950   Phone: [phone]  Address: [address]  [place] [address]      B. Diagnosis    X Retention of urine (788.20/R33.9)   Urinary Incontinence (788.30/R30)   X Incomplete Bladder Emptying (788.21/R39-14)   Urge Incontinence (788.31/N39.41)    Other Specified Retention of Urine (788.29/R33.8)  X Other: Neurogenic bladder     C. Order Information    Number of Refills: 99  Length of Need: 12 months    X Patient may receive up to a 90-day supply at one time; therefore, quantity will be three (3) times amount below.    Type (check one): X Hydrophilic    Silicone    Red Rubber    PVC Clear                                  X Anti-Bacterial / Infection Control    CHECK PRODUCT M[place]ese FREQ OF USE QUANTITY PER STRAIGHT COUDE   ONE  SIZE PER DAY MONTH TO DISPESE      Intermittent Catheter        X Intermittent Catheter with 14 fr 6 240 x    X Insertion Supplies         Condom or External Catheters          ADDITIONAL PRODUCTS/ITEMS ORDERED (check all that apply)     PRODUCT FREQ OF USE QUANTITY PER  ___ Patient Choice     PER MONTH MONTH TO DISPENSE  ___ Bard   x Tube of Lubricant  4  ___ Coloplast    Leg Bags    ___ Cure Medical    Night Bags    ___ GentleCath    Penile Clamp (Cunningham)    ___ Hi-Slip    Disinfection/BZK-PDI Wipes    ___ Willi    Vargas Insertion Tray    ___Lo Fric    Vacuum Erection Device    ___Magic3    Vargas Catheters:    ___ MTG    Straight    ___ Juan-Teleflex    Coude    ___ SpediCath    Luxembourgish size        Balloon size         D. Physician's Information:    Physician's Name: Dr. Usman Em  Phone: [phone]  Date: 18     [facility]  [facility]  [address] [place] [address], [address]    Mariam  Emelia   Office: [phone]  Mobile: [phone]  Fax: [phone]  [email or web address]

## 2018-03-06 ENCOUNTER — TELEPHONE (OUTPATIENT)
Dept: UROLOGY | Facility: CLINIC | Age: 68
End: 2018-03-06

## 2018-03-06 NOTE — TELEPHONE ENCOUNTER
Patients orders to [facility] did not go thru due to insurance issues they sent it to [facility] at [phone] Melissa Valenzuela LPN Staff Nurse

## 2018-07-09 ENCOUNTER — TRANSFERRED RECORDS (OUTPATIENT)
Dept: HEALTH INFORMATION MANAGEMENT | Facility: CLINIC | Age: 68
End: 2018-07-09

## 2018-07-09 ENCOUNTER — RECORDS - HEALTHEAST (OUTPATIENT)
Dept: ADMINISTRATIVE | Facility: OTHER | Age: 68
End: 2018-07-09

## 2018-07-30 ENCOUNTER — RECORDS - HEALTHEAST (OUTPATIENT)
Dept: ADMINISTRATIVE | Facility: OTHER | Age: 68
End: 2018-07-30

## 2018-08-09 ENCOUNTER — HOSPITAL ENCOUNTER (OUTPATIENT)
Dept: CARDIOLOGY | Facility: CLINIC | Age: 68
Discharge: HOME | End: 2018-08-09
Attending: FAMILY MEDICINE

## 2018-08-09 DIAGNOSIS — Z86.73 HISTORY OF STROKE: ICD-10-CM

## 2018-08-09 LAB
AORTIC ROOT: 3.3 CM
AORTIC VALVE MEAN VELOCITY: 115 CM/S
AV DIMENSIONLESS INDEX VTI: 0.5
AV MEAN GRADIENT: 6 MMHG
AV PEAK GRADIENT: 12.1 MMHG
AV VALVE AREA: 2.3 CM2
AV VELOCITY RATIO: 0.5
BSA FOR ECHO PROCEDURE: 1.92 M2
CV BLOOD PRESSURE: NORMAL MMHG
CV ECHO HEIGHT: 68 IN
CV ECHO WEIGHT: 170 LBS
DOP CALC AO PEAK VEL: 174 CM/S
DOP CALC AO VTI: 44.6 CM
DOP CALC LVOT AREA: 4.91 CM2
DOP CALC LVOT DIAMETER: 2.5 CM
DOP CALC LVOT PEAK VEL: 79 CM/S
DOP CALC LVOT STROKE VOLUME: 101.1 CM3
DOP CALCLVOT PEAK VEL VTI: 20.6 CM
EJECTION FRACTION: 66 % (ref 55–75)
FRACTIONAL SHORTENING: 38.3 % (ref 28–44)
INTERVENTRICULAR SEPTUM IN END DIASTOLE: 1.2 CM (ref 0.6–1)
IVS/PW RATIO: 1
LA AREA 1: 20.4 CM2
LA AREA 2: 24.6 CM2
LEFT ATRIUM LENGTH: 5.71 CM
LEFT ATRIUM SIZE: 3.5 CM
LEFT ATRIUM VOLUME INDEX: 38.9 ML/M2
LEFT ATRIUM VOLUME: 74.7 ML
LEFT VENTRICLE CARDIAC INDEX: 2.7 L/MIN/M2
LEFT VENTRICLE CARDIAC OUTPUT: 5.2 L/MIN
LEFT VENTRICLE DIASTOLIC VOLUME INDEX: 53.1 CM3/M2 (ref 34–74)
LEFT VENTRICLE DIASTOLIC VOLUME: 102 CM3 (ref 62–150)
LEFT VENTRICLE HEART RATE: 51 BPM
LEFT VENTRICLE MASS INDEX: 110.4 G/M2
LEFT VENTRICLE SYSTOLIC VOLUME INDEX: 18.2 CM3/M2 (ref 11–31)
LEFT VENTRICLE SYSTOLIC VOLUME: 35 CM3 (ref 21–61)
LEFT VENTRICULAR INTERNAL DIMENSION IN DIASTOLE: 4.7 CM (ref 4.2–5.8)
LEFT VENTRICULAR INTERNAL DIMENSION IN SYSTOLE: 2.9 CM (ref 2.5–4)
LEFT VENTRICULAR MASS: 212 G
LEFT VENTRICULAR OUTFLOW TRACT MEAN GRADIENT: 1 MMHG
LEFT VENTRICULAR OUTFLOW TRACT MEAN VELOCITY: 52.7 CM/S
LEFT VENTRICULAR OUTFLOW TRACT PEAK GRADIENT: 2 MMHG
LEFT VENTRICULAR POSTERIOR WALL IN END DIASTOLE: 1.2 CM (ref 0.6–1)
LV STROKE VOLUME INDEX: 52.6 ML/M2
MITRAL VALVE E/A RATIO: 0.8
MV AVERAGE E/E' RATIO: 9.6 CM/S
MV DECELERATION TIME: 370 MS
MV E'TISSUE VEL-LAT: 8.48 CM/S
MV E'TISSUE VEL-MED: 6.04 CM/S
MV LATERAL E/E' RATIO: 8.2
MV MEDIAL E/E' RATIO: 11.5
MV PEAK A VELOCITY: 83.4 CM/S
MV PEAK E VELOCITY: 69.6 CM/S
NUC REST DIASTOLIC VOLUME INDEX: 2720 LBS
NUC REST SYSTOLIC VOLUME INDEX: 68 IN
TRICUSPID VALVE ANULAR PLANE SYSTOLIC EXCURSION: 2.3 CM

## 2018-08-09 ASSESSMENT — MIFFLIN-ST. JEOR: SCORE: 1505.61

## 2018-08-21 ENCOUNTER — TELEPHONE (OUTPATIENT)
Dept: INTERNAL MEDICINE | Facility: CLINIC | Age: 68
End: 2018-08-21

## 2018-08-21 DIAGNOSIS — M48.00 SPINAL STENOSIS, UNSPECIFIED SPINAL REGION: ICD-10-CM

## 2018-08-21 DIAGNOSIS — G89.29 CHRONIC BACK PAIN, UNSPECIFIED BACK LOCATION, UNSPECIFIED BACK PAIN LATERALITY: ICD-10-CM

## 2018-08-21 DIAGNOSIS — M54.9 CHRONIC BACK PAIN, UNSPECIFIED BACK LOCATION, UNSPECIFIED BACK PAIN LATERALITY: ICD-10-CM

## 2018-08-21 DIAGNOSIS — E11.42 TYPE 2 DIABETES MELLITUS WITH DIABETIC POLYNEUROPATHY, WITH LONG-TERM CURRENT USE OF INSULIN (H): ICD-10-CM

## 2018-08-21 DIAGNOSIS — Z79.4 TYPE 2 DIABETES MELLITUS WITH DIABETIC POLYNEUROPATHY, WITH LONG-TERM CURRENT USE OF INSULIN (H): ICD-10-CM

## 2018-08-22 NOTE — TELEPHONE ENCOUNTER
pregabalin (LYRICA) 100 MG   Last Written Prescription Date:  1/18/18  Last Fill Quantity: 90,   # refills: 1  Last Office Visit : 11/14/17  Future Office visit:  NONE    Routing refill request to provider for review/approval because:  Drug not on the refill protocol or controlled substance

## 2018-08-27 ENCOUNTER — RECORDS - HEALTHEAST (OUTPATIENT)
Dept: LAB | Facility: CLINIC | Age: 68
End: 2018-08-27

## 2018-08-28 DIAGNOSIS — E11.42 TYPE 2 DIABETES MELLITUS WITH DIABETIC POLYNEUROPATHY, WITH LONG-TERM CURRENT USE OF INSULIN (H): ICD-10-CM

## 2018-08-28 DIAGNOSIS — M54.9 CHRONIC BACK PAIN, UNSPECIFIED BACK LOCATION, UNSPECIFIED BACK PAIN LATERALITY: ICD-10-CM

## 2018-08-28 DIAGNOSIS — G89.29 CHRONIC BACK PAIN, UNSPECIFIED BACK LOCATION, UNSPECIFIED BACK PAIN LATERALITY: ICD-10-CM

## 2018-08-28 DIAGNOSIS — Z79.4 TYPE 2 DIABETES MELLITUS WITH DIABETIC POLYNEUROPATHY, WITH LONG-TERM CURRENT USE OF INSULIN (H): ICD-10-CM

## 2018-08-28 DIAGNOSIS — M48.00 SPINAL STENOSIS, UNSPECIFIED SPINAL REGION: ICD-10-CM

## 2018-08-28 RX ORDER — PREGABALIN 100 MG/1
100 CAPSULE ORAL 3 TIMES DAILY
Qty: 90 CAPSULE | Refills: 2
Start: 2018-08-28 | End: 2018-08-28

## 2018-08-28 RX ORDER — PREGABALIN 100 MG/1
100 CAPSULE ORAL 3 TIMES DAILY
Start: 2018-08-28 | End: 2019-06-21

## 2018-08-28 RX ORDER — PREGABALIN 100 MG/1
100 CAPSULE ORAL 3 TIMES DAILY
Qty: 90 CAPSULE | Refills: 2 | Status: SHIPPED | OUTPATIENT
Start: 2018-08-28 | End: 2018-08-28

## 2018-08-28 NOTE — TELEPHONE ENCOUNTER
Dear Ana Paula;    Refilled Lyrica this encounter; however, I recommend he see anyone in the [facility] for follow up.    ThanksSERAFIN

## 2018-08-28 NOTE — TELEPHONE ENCOUNTER
" checked.    Patient received Rx for Lyrica on 8/22/18 for # 90 with 0 refills from Dr. Natacha Payne at [facility] in [place].    Rx updated with \"no print\" to reflect that patient had this Rx filled by another provider.    Ada Duenas RN on 8/28/2018 at 9:29 AM    "

## 2018-08-29 LAB — BACTERIA SPEC CULT: ABNORMAL

## 2018-10-17 ENCOUNTER — RECORDS - HEALTHEAST (OUTPATIENT)
Dept: LAB | Facility: CLINIC | Age: 68
End: 2018-10-17

## 2018-10-20 LAB — BACTERIA SPEC CULT: ABNORMAL

## 2018-11-01 ENCOUNTER — RECORDS - HEALTHEAST (OUTPATIENT)
Dept: LAB | Facility: CLINIC | Age: 68
End: 2018-11-01

## 2018-11-01 LAB
ALBUMIN SERPL-MCNC: 3.4 G/DL (ref 3.5–5)
ALP SERPL-CCNC: 112 U/L (ref 45–120)
ALT SERPL W P-5'-P-CCNC: 14 U/L (ref 0–45)
ANION GAP SERPL CALCULATED.3IONS-SCNC: 10 MMOL/L (ref 5–18)
AST SERPL W P-5'-P-CCNC: 26 U/L (ref 0–40)
BILIRUB SERPL-MCNC: 0.4 MG/DL (ref 0–1)
BUN SERPL-MCNC: 27 MG/DL (ref 8–22)
CALCIUM SERPL-MCNC: 10.6 MG/DL (ref 8.5–10.5)
CHLORIDE BLD-SCNC: 110 MMOL/L (ref 98–107)
CO2 SERPL-SCNC: 19 MMOL/L (ref 22–31)
CREAT SERPL-MCNC: 2.27 MG/DL (ref 0.7–1.3)
GFR SERPL CREATININE-BSD FRML MDRD: 29 ML/MIN/1.73M2
GLUCOSE BLD-MCNC: 85 MG/DL (ref 70–125)
POTASSIUM BLD-SCNC: 5.2 MMOL/L (ref 3.5–5)
PROT SERPL-MCNC: 6.9 G/DL (ref 6–8)
SODIUM SERPL-SCNC: 139 MMOL/L (ref 136–145)

## 2018-11-21 ENCOUNTER — RECORDS - HEALTHEAST (OUTPATIENT)
Dept: LAB | Facility: CLINIC | Age: 68
End: 2018-11-21

## 2018-11-21 LAB
ALBUMIN SERPL-MCNC: 2.9 G/DL (ref 3.5–5)
ALP SERPL-CCNC: 118 U/L (ref 45–120)
ALT SERPL W P-5'-P-CCNC: 17 U/L (ref 0–45)
ANION GAP SERPL CALCULATED.3IONS-SCNC: 8 MMOL/L (ref 5–18)
AST SERPL W P-5'-P-CCNC: 26 U/L (ref 0–40)
BILIRUB SERPL-MCNC: 0.5 MG/DL (ref 0–1)
BUN SERPL-MCNC: 16 MG/DL (ref 8–22)
CALCIUM SERPL-MCNC: 9.8 MG/DL (ref 8.5–10.5)
CHLORIDE BLD-SCNC: 110 MMOL/L (ref 98–107)
CO2 SERPL-SCNC: 23 MMOL/L (ref 22–31)
CREAT SERPL-MCNC: 1.47 MG/DL (ref 0.7–1.3)
GFR SERPL CREATININE-BSD FRML MDRD: 48 ML/MIN/1.73M2
GLUCOSE BLD-MCNC: 106 MG/DL (ref 70–125)
POTASSIUM BLD-SCNC: 4.2 MMOL/L (ref 3.5–5)
PROT SERPL-MCNC: 6.2 G/DL (ref 6–8)
SODIUM SERPL-SCNC: 141 MMOL/L (ref 136–145)

## 2018-11-23 LAB — BACTERIA SPEC CULT: ABNORMAL

## 2018-12-19 ENCOUNTER — RECORDS - HEALTHEAST (OUTPATIENT)
Dept: LAB | Facility: CLINIC | Age: 68
End: 2018-12-19

## 2018-12-19 LAB
ALBUMIN SERPL-MCNC: 2.9 G/DL (ref 3.5–5)
ALP SERPL-CCNC: 85 U/L (ref 45–120)
ALT SERPL W P-5'-P-CCNC: 29 U/L (ref 0–45)
ANION GAP SERPL CALCULATED.3IONS-SCNC: 6 MMOL/L (ref 5–18)
AST SERPL W P-5'-P-CCNC: 40 U/L (ref 0–40)
BILIRUB SERPL-MCNC: 1 MG/DL (ref 0–1)
BUN SERPL-MCNC: 36 MG/DL (ref 8–22)
CALCIUM SERPL-MCNC: 9.5 MG/DL (ref 8.5–10.5)
CHLORIDE BLD-SCNC: 108 MMOL/L (ref 98–107)
CO2 SERPL-SCNC: 26 MMOL/L (ref 22–31)
CREAT SERPL-MCNC: 1.44 MG/DL (ref 0.7–1.3)
GFR SERPL CREATININE-BSD FRML MDRD: 49 ML/MIN/1.73M2
GLUCOSE BLD-MCNC: 96 MG/DL (ref 70–125)
POTASSIUM BLD-SCNC: 4.5 MMOL/L (ref 3.5–5)
PROT SERPL-MCNC: 5.9 G/DL (ref 6–8)
SODIUM SERPL-SCNC: 140 MMOL/L (ref 136–145)

## 2019-01-10 ENCOUNTER — RECORDS - HEALTHEAST (OUTPATIENT)
Dept: LAB | Facility: CLINIC | Age: 69
End: 2019-01-10

## 2019-01-10 LAB
BASOPHILS # BLD AUTO: 0 THOU/UL (ref 0–0.2)
BASOPHILS NFR BLD AUTO: 0 % (ref 0–2)
EOSINOPHIL # BLD AUTO: 0.1 THOU/UL (ref 0–0.4)
EOSINOPHIL NFR BLD AUTO: 1 % (ref 0–6)
ERYTHROCYTE [DISTWIDTH] IN BLOOD BY AUTOMATED COUNT: 14.2 % (ref 11–14.5)
FERRITIN SERPL-MCNC: 436 NG/ML (ref 27–300)
FOLATE SERPL-MCNC: >20 NG/ML
HCT VFR BLD AUTO: 34.9 % (ref 40–54)
HGB BLD-MCNC: 11.5 G/DL (ref 14–18)
LYMPHOCYTES # BLD AUTO: 1.5 THOU/UL (ref 0.8–4.4)
LYMPHOCYTES NFR BLD AUTO: 26 % (ref 20–40)
MCH RBC QN AUTO: 31.4 PG (ref 27–34)
MCHC RBC AUTO-ENTMCNC: 33 G/DL (ref 32–36)
MCV RBC AUTO: 95 FL (ref 80–100)
MONOCYTES # BLD AUTO: 0.6 THOU/UL (ref 0–0.9)
MONOCYTES NFR BLD AUTO: 10 % (ref 2–10)
NEUTROPHILS # BLD AUTO: 3.6 THOU/UL (ref 2–7.7)
NEUTROPHILS NFR BLD AUTO: 63 % (ref 50–70)
PLATELET # BLD AUTO: 82 THOU/UL (ref 140–440)
PMV BLD AUTO: ABNORMAL FL (ref 8.5–12.5)
RBC # BLD AUTO: 3.66 MILL/UL (ref 4.4–6.2)
VIT B12 SERPL-MCNC: >2000 PG/ML (ref 213–816)
WBC: 5.7 THOU/UL (ref 4–11)

## 2019-01-11 ENCOUNTER — RECORDS - HEALTHEAST (OUTPATIENT)
Dept: LAB | Facility: CLINIC | Age: 69
End: 2019-01-11

## 2019-01-11 LAB
BASOPHILS # BLD AUTO: 0 THOU/UL (ref 0–0.2)
BASOPHILS NFR BLD AUTO: 0 % (ref 0–2)
EOSINOPHIL # BLD AUTO: 0.1 THOU/UL (ref 0–0.4)
EOSINOPHIL NFR BLD AUTO: 1 % (ref 0–6)
ERYTHROCYTE [DISTWIDTH] IN BLOOD BY AUTOMATED COUNT: 13.5 % (ref 11–14.5)
HCT VFR BLD AUTO: 34.6 % (ref 40–54)
HGB BLD-MCNC: 12 G/DL (ref 14–18)
LYMPHOCYTES # BLD AUTO: 2.3 THOU/UL (ref 0.8–4.4)
LYMPHOCYTES NFR BLD AUTO: 18 % (ref 20–40)
MCH RBC QN AUTO: 31.8 PG (ref 27–34)
MCHC RBC AUTO-ENTMCNC: 34.7 G/DL (ref 32–36)
MCV RBC AUTO: 92 FL (ref 80–100)
MONOCYTES # BLD AUTO: 1.2 THOU/UL (ref 0–0.9)
MONOCYTES NFR BLD AUTO: 10 % (ref 2–10)
NEUTROPHILS # BLD AUTO: 8.7 THOU/UL (ref 2–7.7)
NEUTROPHILS NFR BLD AUTO: 71 % (ref 50–70)
PLATELET # BLD AUTO: 90 THOU/UL (ref 140–440)
PMV BLD AUTO: 13.9 FL (ref 8.5–12.5)
RBC # BLD AUTO: 3.77 MILL/UL (ref 4.4–6.2)
WBC: 12.3 THOU/UL (ref 4–11)

## 2019-01-12 ENCOUNTER — RECORDS - HEALTHEAST (OUTPATIENT)
Dept: ADMINISTRATIVE | Facility: OTHER | Age: 69
End: 2019-01-12

## 2019-01-12 ENCOUNTER — RECORDS - HEALTHEAST (OUTPATIENT)
Dept: LAB | Facility: CLINIC | Age: 69
End: 2019-01-12

## 2019-01-12 LAB
ALBUMIN SERPL-MCNC: 2.8 G/DL (ref 3.5–5)
ALP SERPL-CCNC: 101 U/L (ref 45–120)
ALT SERPL W P-5'-P-CCNC: 708 U/L (ref 0–45)
ANION GAP SERPL CALCULATED.3IONS-SCNC: 14 MMOL/L (ref 5–18)
AST SERPL W P-5'-P-CCNC: 500 U/L (ref 0–40)
BASOPHILS # BLD AUTO: 0 THOU/UL (ref 0–0.2)
BASOPHILS NFR BLD AUTO: 0 % (ref 0–2)
BILIRUB SERPL-MCNC: 1.4 MG/DL (ref 0–1)
BUN SERPL-MCNC: 21 MG/DL (ref 8–22)
CALCIUM SERPL-MCNC: 9.2 MG/DL (ref 8.5–10.5)
CHLORIDE BLD-SCNC: 113 MMOL/L (ref 98–107)
CO2 SERPL-SCNC: 17 MMOL/L (ref 22–31)
CREAT SERPL-MCNC: 1.35 MG/DL (ref 0.7–1.3)
EOSINOPHIL # BLD AUTO: 0.1 THOU/UL (ref 0–0.4)
EOSINOPHIL NFR BLD AUTO: 1 % (ref 0–6)
ERYTHROCYTE [DISTWIDTH] IN BLOOD BY AUTOMATED COUNT: 13.5 % (ref 11–14.5)
GFR SERPL CREATININE-BSD FRML MDRD: 53 ML/MIN/1.73M2
GLUCOSE BLD-MCNC: 206 MG/DL (ref 70–125)
HCT VFR BLD AUTO: 34.6 % (ref 40–54)
HGB BLD-MCNC: 12 G/DL (ref 14–18)
LYMPHOCYTES # BLD AUTO: 2.3 THOU/UL (ref 0.8–4.4)
LYMPHOCYTES NFR BLD AUTO: 18 % (ref 20–40)
MCH RBC QN AUTO: 31.8 PG (ref 27–34)
MCHC RBC AUTO-ENTMCNC: 34.7 G/DL (ref 32–36)
MCV RBC AUTO: 92 FL (ref 80–100)
MONOCYTES # BLD AUTO: 1.2 THOU/UL (ref 0–0.9)
MONOCYTES NFR BLD AUTO: 10 % (ref 2–10)
NEUTROPHILS # BLD AUTO: 8.7 THOU/UL (ref 2–7.7)
NEUTROPHILS NFR BLD AUTO: 70 % (ref 50–70)
PATH REPORT.MICROSCOPIC SPEC OTHER STN: ABNORMAL
PLATELET # BLD AUTO: 90 THOU/UL (ref 140–440)
PMV BLD AUTO: 13.9 FL (ref 8.5–12.5)
POTASSIUM BLD-SCNC: 4.5 MMOL/L (ref 3.5–5)
PROT SERPL-MCNC: 6.1 G/DL (ref 6–8)
RBC # BLD AUTO: 3.77 MILL/UL (ref 4.4–6.2)
SODIUM SERPL-SCNC: 144 MMOL/L (ref 136–145)
WBC: 12.3 THOU/UL (ref 4–11)

## 2019-01-14 ENCOUNTER — RECORDS - HEALTHEAST (OUTPATIENT)
Dept: LAB | Facility: CLINIC | Age: 69
End: 2019-01-14

## 2019-01-14 LAB
BACTERIA SPEC CULT: ABNORMAL
BACTERIA SPEC CULT: ABNORMAL
LAB AP CHARGES (HE HISTORICAL CONVERSION): NORMAL
PATH REPORT.COMMENTS IMP SPEC: NORMAL
PATH REPORT.COMMENTS IMP SPEC: NORMAL
PATH REPORT.FINAL DX SPEC: NORMAL
PATH REPORT.MICROSCOPIC SPEC OTHER STN: NORMAL
PATH REPORT.RELEVANT HX SPEC: NORMAL

## 2019-01-17 LAB
HCV RNA SERPL NAA+PROBE-ACNC: NORMAL [IU]/ML
HCV RNA SERPL NAA+PROBE-LOG IU: NORMAL LOG IU/ML

## 2019-01-30 ENCOUNTER — RECORDS - HEALTHEAST (OUTPATIENT)
Dept: LAB | Facility: CLINIC | Age: 69
End: 2019-01-30

## 2019-01-30 LAB
ALBUMIN SERPL-MCNC: 2.8 G/DL (ref 3.5–5)
ALP SERPL-CCNC: 102 U/L (ref 45–120)
ALT SERPL W P-5'-P-CCNC: 27 U/L (ref 0–45)
ANION GAP SERPL CALCULATED.3IONS-SCNC: 12 MMOL/L (ref 5–18)
AST SERPL W P-5'-P-CCNC: 29 U/L (ref 0–40)
BILIRUB SERPL-MCNC: 0.7 MG/DL (ref 0–1)
BUN SERPL-MCNC: 22 MG/DL (ref 8–22)
CALCIUM SERPL-MCNC: 9.6 MG/DL (ref 8.5–10.5)
CHLORIDE BLD-SCNC: 110 MMOL/L (ref 98–107)
CO2 SERPL-SCNC: 20 MMOL/L (ref 22–31)
CREAT SERPL-MCNC: 1.38 MG/DL (ref 0.7–1.3)
GFR SERPL CREATININE-BSD FRML MDRD: 51 ML/MIN/1.73M2
GLUCOSE BLD-MCNC: 109 MG/DL (ref 70–125)
HBV SURFACE AG SERPL QL IA: NEGATIVE
POTASSIUM BLD-SCNC: 4 MMOL/L (ref 3.5–5)
PROT SERPL-MCNC: 6.5 G/DL (ref 6–8)
SODIUM SERPL-SCNC: 142 MMOL/L (ref 136–145)

## 2019-01-31 LAB — BACTERIA SPEC CULT: NO GROWTH

## 2019-04-18 ENCOUNTER — RECORDS - HEALTHEAST (OUTPATIENT)
Dept: LAB | Facility: CLINIC | Age: 69
End: 2019-04-18

## 2019-04-18 LAB
ALBUMIN SERPL-MCNC: 2.6 G/DL (ref 3.5–5)
ALP SERPL-CCNC: 93 U/L (ref 45–120)
ALT SERPL W P-5'-P-CCNC: 14 U/L (ref 0–45)
ANION GAP SERPL CALCULATED.3IONS-SCNC: 10 MMOL/L (ref 5–18)
AST SERPL W P-5'-P-CCNC: 29 U/L (ref 0–40)
BASOPHILS # BLD AUTO: 0.1 THOU/UL (ref 0–0.2)
BASOPHILS NFR BLD AUTO: 1 % (ref 0–2)
BILIRUB SERPL-MCNC: 0.5 MG/DL (ref 0–1)
BUN SERPL-MCNC: 16 MG/DL (ref 8–22)
CALCIUM SERPL-MCNC: 9 MG/DL (ref 8.5–10.5)
CHLORIDE BLD-SCNC: 117 MMOL/L (ref 98–107)
CHOLEST SERPL-MCNC: 170 MG/DL
CO2 SERPL-SCNC: 18 MMOL/L (ref 22–31)
CREAT SERPL-MCNC: 1.46 MG/DL (ref 0.7–1.3)
EOSINOPHIL # BLD AUTO: 0.1 THOU/UL (ref 0–0.4)
EOSINOPHIL NFR BLD AUTO: 3 % (ref 0–6)
ERYTHROCYTE [DISTWIDTH] IN BLOOD BY AUTOMATED COUNT: 14.2 % (ref 11–14.5)
FASTING STATUS PATIENT QL REPORTED: NO
GFR SERPL CREATININE-BSD FRML MDRD: 48 ML/MIN/1.73M2
GLUCOSE BLD-MCNC: 90 MG/DL (ref 70–125)
HCT VFR BLD AUTO: 35.5 % (ref 40–54)
HDLC SERPL-MCNC: 78 MG/DL
HGB BLD-MCNC: 11.9 G/DL (ref 14–18)
LDLC SERPL CALC-MCNC: 80 MG/DL
LYMPHOCYTES # BLD AUTO: 1.8 THOU/UL (ref 0.8–4.4)
LYMPHOCYTES NFR BLD AUTO: 39 % (ref 20–40)
MCH RBC QN AUTO: 30.1 PG (ref 27–34)
MCHC RBC AUTO-ENTMCNC: 33.5 G/DL (ref 32–36)
MCV RBC AUTO: 90 FL (ref 80–100)
MONOCYTES # BLD AUTO: 0.6 THOU/UL (ref 0–0.9)
MONOCYTES NFR BLD AUTO: 13 % (ref 2–10)
NEUTROPHILS # BLD AUTO: 2 THOU/UL (ref 2–7.7)
NEUTROPHILS NFR BLD AUTO: 44 % (ref 50–70)
PLATELET # BLD AUTO: 112 THOU/UL (ref 140–440)
PMV BLD AUTO: 14 FL (ref 8.5–12.5)
POTASSIUM BLD-SCNC: 4.4 MMOL/L (ref 3.5–5)
PROT SERPL-MCNC: 5.9 G/DL (ref 6–8)
RBC # BLD AUTO: 3.96 MILL/UL (ref 4.4–6.2)
SODIUM SERPL-SCNC: 145 MMOL/L (ref 136–145)
T4 FREE SERPL-MCNC: 0.8 NG/DL (ref 0.7–1.8)
TRIGL SERPL-MCNC: 62 MG/DL
TSH SERPL DL<=0.005 MIU/L-ACNC: 0.43 UIU/ML (ref 0.3–5)
VIT B12 SERPL-MCNC: 492 PG/ML (ref 213–816)
WBC: 4.7 THOU/UL (ref 4–11)

## 2019-04-19 LAB
25(OH)D3 SERPL-MCNC: 31.1 NG/ML (ref 30–80)
RPR (REFLEX ORDER ONLY): ABNORMAL
T PALLIDUM AB SER QL: ABNORMAL

## 2019-04-21 LAB
BACTERIA SPEC CULT: ABNORMAL
BACTERIA SPEC CULT: ABNORMAL

## 2019-04-23 LAB — RPR SER-TITR: 1 {TITER}

## 2019-05-10 ENCOUNTER — RECORDS - HEALTHEAST (OUTPATIENT)
Dept: LAB | Facility: CLINIC | Age: 69
End: 2019-05-10

## 2019-05-13 ENCOUNTER — AMBULATORY - HEALTHEAST (OUTPATIENT)
Dept: NEUROLOGY | Facility: CLINIC | Age: 69
End: 2019-05-13

## 2019-05-13 DIAGNOSIS — R41.3 MEMORY CHANGES: ICD-10-CM

## 2019-05-13 LAB — BACTERIA SPEC CULT: ABNORMAL

## 2019-06-12 NOTE — PROGRESS NOTES
SUBJECTIVE/OBJECTIVE:                           aJvi Celaya is a 69 year old male coming in for an initial visit for Medication Therapy Management.  He was referred to me from Mary Banuelos.      Chief Complaint: Medication Review.      Allergies/ADRs: Minocycline, hepatotoxicity.  Tobacco: History of tobacco dependence - quit 1-3 months ago  Alcohol: not currently using  Caffeine: no caffeine  Activity: Is not able to routinely exercise, has mobility issues.  Uses a walker.    Medication Adherence/Access:  Adherence to medications as a concern.  Patient takes medications directly from bottles.  Patient takes medications a few times per week.  Patient has not been taking lactulose at all.  Patient did not bring in his pill bottles today, so cannot confirm this.  Asked him to bring in all of his prescription and OTC pill bottles at her next appointment      Hypertension:  Patient not currently taking any medications, but Dr. Payne noticed that he is supposed to be taking lisinopril and his blood pressure was high today in clinic.  She is going to restart this today.  Patient does not self-monitor BP.  Patient reports no current medication side effects. Pt had been on lisinopril in the past, but stopped taking it--unclear why.    Neuropathy:   Currently taking Lyrica 200mg BID.  His nerve pain is doing terrible.  It has been keeping him up at night.  Is having a lot of leg cramps and neuropathy at night.  Pt wanted to get a muscle relaxer.  Dr. Payne discussed with him that it would be unsafe at this time due to sedation.   Pt previously on oxycodone, but doesn't have right now.  Discussed trying to treat pain with lower risk options.    Diabetes:  Pt currently taking Lantus 12 units BID.  Increased today from 10 units BID By Dr. Payne. Pt is not experiencing side effects.  SMBG: three times daily.   Ranges (patient reported): Did not have glucometer with him today.  AM - 111   PM/before lunch - 137  HS -  "113  Patient is not experiencing hypoglycemia, Doesn't typically feel symptoms of it.  Once it was 63 and he didn't feel any symptoms.  Thinks it occurred twice in the last month.  Recent symptoms of high blood sugar? tingling and numbness    ACEi/ARB: No, but Dr. Payne is starting lisinopril again today.  Urine Albumin:   Lab Results   Component Value Date    UMALCR 1,174.35 (H) 11/14/2017      Aspirin: Taking 81mg daily and denies side effects  Diet/Exercise: limited mobility, uses a walker.      GLYCOSYLATED HGB A1C - 06/19/2019      NAME VALUE REFERENCE RANGE LAB   F HGB A1C 7.5 H 4.2-6.1 (%)          GLYCOSYLATED HGB A1C - 12/19/2018      NAME VALUE REFERENCE RANGE LAB   F HGB A1C 5.7 4.2-6.1 (%)        Constipation:  Currently taking Miralax powder 17g every other day.  BM's 3 times per week.  Pt is not taking Lactulose, and should be taking it QID.  Didn't get a chance to discuss barriers to taking this today as he had to leave to catch his bus.      Hyperlipidemia:   Current therapy includes Atorvastatin 40mg once daily.  Pt reports no significant myalgias or other side effects.  Pt had muscle cramps at night and neuropathy, but not likely from statin.    UTI Prophylaxis:    Patient does not currently taking anything for UTI prophylaxis.  His urologist recently recommended that he take methenamine.  Patient initially refused to take this because it has formaldehyde and it.  We discussed today in clinic the common risks and side effects with methenamine, and that the formaldehyde is only seen in small amounts in the urine.      Today's Vitals: /80   Pulse 60   Ht 5' 8.6\" (1.742 m)   Wt 171 lb (77.6 kg)   BMI 25.55 kg/m        ASSESSMENT:                             Current medications were reviewed today.     Medication Adherence:   Poor, needs improvement -advised use of pill boxes to help med adherence.  Patient is to bring his medications into the clinic at her next appointment.  Then we can ask " further assess how he is organizing his medications, and ways to make this easier.  We will plan to show him how to use a pillbox, and possibly use alarm reminders on his phone to help with adherence.    Hypertension:  Needs Improvement.   Patient is not meeting BP goal of < 140/90mmHg.  Lisinopril was restarted today.      Neuropathy:   Needs improvement.  Patient requested muscle relaxer, however would be preferred to avoid this due to risk of oversedation and risk for falls.  Patient currently only taking 2 capsules of Lyrica per day, could try taking this the third dose of Lyrica before bedtime to see if it helps.  Patient states he has done this in the past, but is willing to try this again.      Diabetes:   Needs Improvement. Patient is not meeting A1c goal of < 7%.  His dose of Lantus was increased today.    Constipation:  Needs improvement.    We discussed that is it important it is important for him to take his lactulose.  This will help him with constipation, but also his hepatotoxicity/ammonia levels.  We will continue to follow up on adherence to lactulose discuss in more detail follow-up if there are certain reasons he is not taking it.      Hyperlipidemia:   Stable. Pt is on high intensity statin which is indicated based on 2018 ACC/AHA guidelines for lipid management.        UTI Prophylaxis:    Patient was agreeable to trying methenamine    PLAN:                                1) Recommend increasing dose of Lyrica to TID.  Pt has only been using two capsules daily, and if taken before bedtime it might help with his nocturnal cramps.    Note -- we will discuss more on ways to help him remember his medications at his next appointment.  We talked about this a little bit, but he had to to leave to catch his bus.    I spent 60 minutes with this patient today. No recommendations for changing current therapy. A copy of the visit note was provided to the patient's primary care provider.    Will follow up on  7/3.    The patient declined a summary of these recommendations as an after visit summary.     Odette Godwin PharmD  Medication Therapy Management Pharmacist  Pager: [phone]

## 2019-06-19 ENCOUNTER — OFFICE VISIT (OUTPATIENT)
Dept: PHARMACY | Facility: PHYSICIAN GROUP | Age: 69
End: 2019-06-19
Payer: COMMERCIAL

## 2019-06-19 DIAGNOSIS — K59.00 CONSTIPATION, UNSPECIFIED CONSTIPATION TYPE: ICD-10-CM

## 2019-06-19 DIAGNOSIS — I10 BENIGN ESSENTIAL HYPERTENSION: Primary | ICD-10-CM

## 2019-06-19 DIAGNOSIS — Z29.89 NEED FOR PROPHYLAXIS AGAINST URINARY TRACT INFECTION: ICD-10-CM

## 2019-06-19 DIAGNOSIS — G62.9 NEUROPATHY: ICD-10-CM

## 2019-06-19 DIAGNOSIS — Z79.4 TYPE 2 DIABETES MELLITUS WITH COMPLICATION, WITH LONG-TERM CURRENT USE OF INSULIN (H): ICD-10-CM

## 2019-06-19 DIAGNOSIS — E78.5 HYPERLIPIDEMIA LDL GOAL <70: ICD-10-CM

## 2019-06-19 DIAGNOSIS — E11.8 TYPE 2 DIABETES MELLITUS WITH COMPLICATION, WITH LONG-TERM CURRENT USE OF INSULIN (H): ICD-10-CM

## 2019-06-19 PROCEDURE — 99605 MTMS BY PHARM NP 15 MIN: CPT | Performed by: PHARMACIST

## 2019-06-19 PROCEDURE — 99607 MTMS BY PHARM ADDL 15 MIN: CPT | Performed by: PHARMACIST

## 2019-06-21 VITALS
HEART RATE: 60 BPM | BODY MASS INDEX: 25.33 KG/M2 | HEIGHT: 69 IN | WEIGHT: 171 LBS | DIASTOLIC BLOOD PRESSURE: 80 MMHG | SYSTOLIC BLOOD PRESSURE: 180 MMHG

## 2019-06-21 RX ORDER — ATORVASTATIN CALCIUM 40 MG/1
40 TABLET, FILM COATED ORAL DAILY
COMMUNITY
End: 2019-07-10

## 2019-06-21 RX ORDER — BISACODYL 5 MG/1
5 TABLET, DELAYED RELEASE ORAL DAILY PRN
COMMUNITY

## 2019-06-21 RX ORDER — PREGABALIN 200 MG/1
200 CAPSULE ORAL 3 TIMES DAILY
COMMUNITY
End: 2022-08-15

## 2019-06-21 ASSESSMENT — MIFFLIN-ST. JEOR: SCORE: 1529.68

## 2019-07-10 ENCOUNTER — OFFICE VISIT (OUTPATIENT)
Dept: PHARMACY | Facility: PHYSICIAN GROUP | Age: 69
End: 2019-07-10
Payer: COMMERCIAL

## 2019-07-10 DIAGNOSIS — K59.00 CONSTIPATION, UNSPECIFIED CONSTIPATION TYPE: ICD-10-CM

## 2019-07-10 DIAGNOSIS — E11.8 TYPE 2 DIABETES MELLITUS WITH COMPLICATION, WITH LONG-TERM CURRENT USE OF INSULIN (H): ICD-10-CM

## 2019-07-10 DIAGNOSIS — I10 BENIGN ESSENTIAL HYPERTENSION: Primary | ICD-10-CM

## 2019-07-10 DIAGNOSIS — Z78.9 TAKES DIETARY SUPPLEMENTS: ICD-10-CM

## 2019-07-10 DIAGNOSIS — E78.5 HYPERLIPIDEMIA LDL GOAL <70: ICD-10-CM

## 2019-07-10 DIAGNOSIS — G62.9 NEUROPATHY: ICD-10-CM

## 2019-07-10 DIAGNOSIS — K76.82 ENCEPHALOPATHY, PORTAL SYSTEMIC (H): ICD-10-CM

## 2019-07-10 DIAGNOSIS — Z79.4 TYPE 2 DIABETES MELLITUS WITH COMPLICATION, WITH LONG-TERM CURRENT USE OF INSULIN (H): ICD-10-CM

## 2019-07-10 DIAGNOSIS — Z29.89 NEED FOR PROPHYLAXIS AGAINST URINARY TRACT INFECTION: ICD-10-CM

## 2019-07-10 PROCEDURE — 99607 MTMS BY PHARM ADDL 15 MIN: CPT | Performed by: PHARMACIST

## 2019-07-10 PROCEDURE — 99606 MTMS BY PHARM EST 15 MIN: CPT | Performed by: PHARMACIST

## 2019-07-10 RX ORDER — METHENAMINE HIPPURATE 1000 MG/1
1 TABLET ORAL 2 TIMES DAILY
COMMUNITY
End: 2020-10-29

## 2019-07-10 RX ORDER — LANOLIN ALCOHOL/MO/W.PET/CERES
1000 CREAM (GRAM) TOPICAL DAILY
COMMUNITY

## 2019-07-10 RX ORDER — POLYETHYLENE GLYCOL 3350 17 G/17G
1 POWDER, FOR SOLUTION ORAL 2 TIMES DAILY PRN
COMMUNITY
End: 2022-08-15

## 2019-07-10 RX ORDER — ZINC GLUCONATE 50 MG
50 TABLET ORAL DAILY
COMMUNITY
End: 2020-10-29

## 2019-07-10 RX ORDER — LECITHIN/GINKGO/S.GINSENG/GOTU 50-150-250
100 TABLET ORAL DAILY
COMMUNITY
End: 2020-10-29

## 2019-07-10 RX ORDER — MULTIVIT WITH MINERALS/LUTEIN
1000 TABLET ORAL DAILY
COMMUNITY
End: 2022-08-15
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
## 2019-07-12 VITALS
SYSTOLIC BLOOD PRESSURE: 170 MMHG | WEIGHT: 178 LBS | HEART RATE: 58 BPM | BODY MASS INDEX: 26.36 KG/M2 | HEIGHT: 69 IN | DIASTOLIC BLOOD PRESSURE: 84 MMHG

## 2019-07-12 RX ORDER — HYDROCHLOROTHIAZIDE 12.5 MG/1
25 TABLET ORAL DAILY
COMMUNITY
End: 2022-08-15

## 2019-07-12 ASSESSMENT — MIFFLIN-ST. JEOR: SCORE: 1559.84

## 2019-07-13 NOTE — PATIENT INSTRUCTIONS
"Recommendations from today's MTM visit:                                                    MTM (medication therapy management) is a service provided by a clinical pharmacist designed to help you get the most of out of your medicines.   Today we reviewed what your medicines are for, how to know if they are working, that your medicines are safe and how to make your medicine regimen as easy as possible.     1) Increase how often you take the lactulose liquid to 1 capful 3 times a day.      2) A new prescription of bisacodyl was sent in.  If this is not covered by her insurance, you should be able to pay the cash price for it which which should be fairly inexpensive or a few dollars.      3) Stop taking aspirin 325 mg (orange pills), and to start taking aspirin 81 mg (yellow pill).    4) I would recommend stopping Garcinia Cambogia due to the potential harm it can cause to your liver.  I also found a few other supplements that have possible risks of liver failure or damage.  I would also recommend stopping Liver Defense, Bee Pollen, Brain and Memory liquid.      5) There are a few others of your supplements that are likely safe to take, but in the studies that they have done they have not been shown to help with symptoms.  This would include Oil of Oregano liquid, Cats Claw liquid, and \"Green\" Black [place] complex liquid.  I think you could consider stopping these liquids.  It would be fine for you to continue using what you have left at home, but you could always consider a trial off of them and see if you notice any difference in how you feel.      To schedule another MTM appointment, please call the clinic directly or you may call the MTM scheduling line at [phone] or toll-free at [phone].     My Clinical Pharmacist's contact information:                                                      It was a pleasure talking with you today!  Please feel free to contact me with any questions or concerns " you have.      Odette Godwin PharmD  Medication Therapy Management Pharmacist  Pager: [phone]      You may receive a survey about the MTM services you received by email and/or US Mail.  I would appreciate your feedback to help me serve you better in the future. Your comments will be anonymous.

## 2019-07-15 ENCOUNTER — HOME CARE/HOSPICE - HEALTHEAST (OUTPATIENT)
Dept: HOME HEALTH SERVICES | Facility: HOME HEALTH | Age: 69
End: 2019-07-15

## 2019-07-17 ENCOUNTER — HOME CARE/HOSPICE - HEALTHEAST (OUTPATIENT)
Dept: HOME HEALTH SERVICES | Facility: HOME HEALTH | Age: 69
End: 2019-07-17

## 2019-07-19 ENCOUNTER — HOME CARE/HOSPICE - HEALTHEAST (OUTPATIENT)
Dept: HOME HEALTH SERVICES | Facility: HOME HEALTH | Age: 69
End: 2019-07-19

## 2019-07-22 ENCOUNTER — HOME CARE/HOSPICE - HEALTHEAST (OUTPATIENT)
Dept: HOME HEALTH SERVICES | Facility: HOME HEALTH | Age: 69
End: 2019-07-22

## 2019-07-24 ENCOUNTER — HOME CARE/HOSPICE - HEALTHEAST (OUTPATIENT)
Dept: HOME HEALTH SERVICES | Facility: HOME HEALTH | Age: 69
End: 2019-07-24

## 2019-07-25 ENCOUNTER — HOME CARE/HOSPICE - HEALTHEAST (OUTPATIENT)
Dept: HOME HEALTH SERVICES | Facility: HOME HEALTH | Age: 69
End: 2019-07-25

## 2019-07-25 ENCOUNTER — AMBULATORY - HEALTHEAST (OUTPATIENT)
Dept: HOME HEALTH SERVICES | Facility: HOME HEALTH | Age: 69
End: 2019-07-25

## 2019-07-26 ENCOUNTER — HOME CARE/HOSPICE - HEALTHEAST (OUTPATIENT)
Dept: HOME HEALTH SERVICES | Facility: HOME HEALTH | Age: 69
End: 2019-07-26

## 2019-07-29 ENCOUNTER — HOME CARE/HOSPICE - HEALTHEAST (OUTPATIENT)
Dept: HOME HEALTH SERVICES | Facility: HOME HEALTH | Age: 69
End: 2019-07-29

## 2019-07-31 ENCOUNTER — RECORDS - HEALTHEAST (OUTPATIENT)
Dept: LAB | Facility: CLINIC | Age: 69
End: 2019-07-31

## 2019-07-31 LAB
ANION GAP SERPL CALCULATED.3IONS-SCNC: 7 MMOL/L (ref 5–18)
BUN SERPL-MCNC: 25 MG/DL (ref 8–22)
CALCIUM SERPL-MCNC: 9 MG/DL (ref 8.5–10.5)
CHLORIDE BLD-SCNC: 109 MMOL/L (ref 98–107)
CO2 SERPL-SCNC: 26 MMOL/L (ref 22–31)
CREAT SERPL-MCNC: 2.03 MG/DL (ref 0.7–1.3)
GFR SERPL CREATININE-BSD FRML MDRD: 33 ML/MIN/1.73M2
GLUCOSE BLD-MCNC: 229 MG/DL (ref 70–125)
POTASSIUM BLD-SCNC: 4.4 MMOL/L (ref 3.5–5)
SODIUM SERPL-SCNC: 142 MMOL/L (ref 136–145)

## 2019-08-01 ENCOUNTER — RECORDS - HEALTHEAST (OUTPATIENT)
Dept: ADMINISTRATIVE | Facility: OTHER | Age: 69
End: 2019-08-01

## 2019-08-02 ENCOUNTER — HOME CARE/HOSPICE - HEALTHEAST (OUTPATIENT)
Dept: HOME HEALTH SERVICES | Facility: HOME HEALTH | Age: 69
End: 2019-08-02

## 2019-08-03 ENCOUNTER — HOME CARE/HOSPICE - HEALTHEAST (OUTPATIENT)
Dept: HOME HEALTH SERVICES | Facility: HOME HEALTH | Age: 69
End: 2019-08-03

## 2019-08-06 ENCOUNTER — HOME CARE/HOSPICE - HEALTHEAST (OUTPATIENT)
Dept: HOME HEALTH SERVICES | Facility: HOME HEALTH | Age: 69
End: 2019-08-06

## 2019-08-07 ENCOUNTER — HOME CARE/HOSPICE - HEALTHEAST (OUTPATIENT)
Dept: HOME HEALTH SERVICES | Facility: HOME HEALTH | Age: 69
End: 2019-08-07

## 2019-08-08 ENCOUNTER — HOME CARE/HOSPICE - HEALTHEAST (OUTPATIENT)
Dept: HOME HEALTH SERVICES | Facility: HOME HEALTH | Age: 69
End: 2019-08-08

## 2019-08-11 ENCOUNTER — HOME CARE/HOSPICE - HEALTHEAST (OUTPATIENT)
Dept: ADMINISTRATIVE | Facility: OTHER | Age: 69
End: 2019-08-11

## 2019-08-29 ENCOUNTER — RECORDS - HEALTHEAST (OUTPATIENT)
Dept: LAB | Facility: CLINIC | Age: 69
End: 2019-08-29

## 2019-08-29 LAB
ANION GAP SERPL CALCULATED.3IONS-SCNC: 8 MMOL/L (ref 5–18)
BUN SERPL-MCNC: 13 MG/DL (ref 8–22)
CALCIUM SERPL-MCNC: 9 MG/DL (ref 8.5–10.5)
CHLORIDE BLD-SCNC: 110 MMOL/L (ref 98–107)
CO2 SERPL-SCNC: 25 MMOL/L (ref 22–31)
CREAT SERPL-MCNC: 1.35 MG/DL (ref 0.7–1.3)
GFR SERPL CREATININE-BSD FRML MDRD: 53 ML/MIN/1.73M2
GLUCOSE BLD-MCNC: 169 MG/DL (ref 70–125)
POTASSIUM BLD-SCNC: 4 MMOL/L (ref 3.5–5)
SODIUM SERPL-SCNC: 143 MMOL/L (ref 136–145)

## 2019-09-23 ENCOUNTER — RECORDS - HEALTHEAST (OUTPATIENT)
Dept: LAB | Facility: CLINIC | Age: 69
End: 2019-09-23

## 2019-09-23 ENCOUNTER — TRANSFERRED RECORDS (OUTPATIENT)
Dept: HEALTH INFORMATION MANAGEMENT | Facility: CLINIC | Age: 69
End: 2019-09-23

## 2019-09-23 LAB — HBA1C MFR BLD: 6.5 % (ref 4.2–6.1)

## 2019-09-24 LAB
ALBUMIN SERPL-MCNC: 2.7 G/DL (ref 3.5–5)
ALP SERPL-CCNC: 126 U/L (ref 45–120)
ALT SERPL W P-5'-P-CCNC: <9 U/L (ref 0–45)
ANION GAP SERPL CALCULATED.3IONS-SCNC: 5 MMOL/L (ref 5–18)
AST SERPL W P-5'-P-CCNC: 19 U/L (ref 0–40)
BILIRUB SERPL-MCNC: 0.4 MG/DL (ref 0–1)
BUN SERPL-MCNC: 18 MG/DL (ref 8–22)
CALCIUM SERPL-MCNC: 9.1 MG/DL (ref 8.5–10.5)
CHLORIDE BLD-SCNC: 112 MMOL/L (ref 98–107)
CO2 SERPL-SCNC: 21 MMOL/L (ref 22–31)
CREAT SERPL-MCNC: 2 MG/DL (ref 0.7–1.3)
GFR SERPL CREATININE-BSD FRML MDRD: 33 ML/MIN/1.73M2
GLUCOSE BLD-MCNC: 142 MG/DL (ref 70–125)
POTASSIUM BLD-SCNC: 5 MMOL/L (ref 3.5–5)
PROT SERPL-MCNC: 6.3 G/DL (ref 6–8)
SODIUM SERPL-SCNC: 138 MMOL/L (ref 136–145)

## 2020-01-30 ENCOUNTER — RECORDS - HEALTHEAST (OUTPATIENT)
Dept: LAB | Facility: CLINIC | Age: 70
End: 2020-01-30

## 2020-02-02 LAB — BACTERIA SPEC CULT: ABNORMAL

## 2020-03-10 ENCOUNTER — HEALTH MAINTENANCE LETTER (OUTPATIENT)
Age: 70
End: 2020-03-10

## 2020-05-20 ENCOUNTER — RECORDS - HEALTHEAST (OUTPATIENT)
Dept: LAB | Facility: CLINIC | Age: 70
End: 2020-05-20

## 2020-05-20 LAB
ALBUMIN SERPL-MCNC: 2.1 G/DL (ref 3.5–5)
ALP SERPL-CCNC: 125 U/L (ref 45–120)
ALT SERPL W P-5'-P-CCNC: 11 U/L (ref 0–45)
ANION GAP SERPL CALCULATED.3IONS-SCNC: 10 MMOL/L (ref 5–18)
AST SERPL W P-5'-P-CCNC: 17 U/L (ref 0–40)
BASOPHILS # BLD AUTO: 0 THOU/UL (ref 0–0.2)
BASOPHILS NFR BLD AUTO: 1 % (ref 0–2)
BILIRUB SERPL-MCNC: 0.5 MG/DL (ref 0–1)
BUN SERPL-MCNC: 16 MG/DL (ref 8–22)
CALCIUM SERPL-MCNC: 9.8 MG/DL (ref 8.5–10.5)
CHLORIDE BLD-SCNC: 114 MMOL/L (ref 98–107)
CO2 SERPL-SCNC: 21 MMOL/L (ref 22–31)
CREAT SERPL-MCNC: 1.62 MG/DL (ref 0.7–1.3)
EOSINOPHIL # BLD AUTO: 0.1 THOU/UL (ref 0–0.4)
EOSINOPHIL NFR BLD AUTO: 2 % (ref 0–6)
ERYTHROCYTE [DISTWIDTH] IN BLOOD BY AUTOMATED COUNT: 15.3 % (ref 11–14.5)
GFR SERPL CREATININE-BSD FRML MDRD: 42 ML/MIN/1.73M2
GLUCOSE BLD-MCNC: 281 MG/DL (ref 70–125)
HCT VFR BLD AUTO: 39.1 % (ref 40–54)
HGB BLD-MCNC: 13.3 G/DL (ref 14–18)
LYMPHOCYTES # BLD AUTO: 1.4 THOU/UL (ref 0.8–4.4)
LYMPHOCYTES NFR BLD AUTO: 37 % (ref 20–40)
MCH RBC QN AUTO: 30.1 PG (ref 27–34)
MCHC RBC AUTO-ENTMCNC: 34 G/DL (ref 32–36)
MCV RBC AUTO: 89 FL (ref 80–100)
MONOCYTES # BLD AUTO: 0.5 THOU/UL (ref 0–0.9)
MONOCYTES NFR BLD AUTO: 14 % (ref 2–10)
NEUTROPHILS # BLD AUTO: 1.7 THOU/UL (ref 2–7.7)
NEUTROPHILS NFR BLD AUTO: 45 % (ref 50–70)
PLATELET # BLD AUTO: 94 THOU/UL (ref 140–440)
PMV BLD AUTO: 13.6 FL (ref 8.5–12.5)
POTASSIUM BLD-SCNC: 4.4 MMOL/L (ref 3.5–5)
PROT SERPL-MCNC: 5.4 G/DL (ref 6–8)
PSA SERPL-MCNC: 0.2 NG/ML (ref 0–4.5)
RBC # BLD AUTO: 4.42 MILL/UL (ref 4.4–6.2)
SODIUM SERPL-SCNC: 145 MMOL/L (ref 136–145)
WBC: 3.7 THOU/UL (ref 4–11)

## 2020-05-21 ENCOUNTER — RECORDS - HEALTHEAST (OUTPATIENT)
Dept: LAB | Facility: CLINIC | Age: 70
End: 2020-05-21

## 2020-05-21 LAB
CHOLEST SERPL-MCNC: 187 MG/DL
FASTING STATUS PATIENT QL REPORTED: NORMAL
HDLC SERPL-MCNC: 77 MG/DL
HEPATITIS B SURFACE ANTIBODY LHE- HISTORICAL: POSITIVE
LDLC SERPL CALC-MCNC: 102 MG/DL
TRIGL SERPL-MCNC: 41 MG/DL

## 2020-07-29 ENCOUNTER — TRANSFERRED RECORDS (OUTPATIENT)
Dept: HEALTH INFORMATION MANAGEMENT | Facility: CLINIC | Age: 70
End: 2020-07-29

## 2020-07-29 ENCOUNTER — RECORDS - HEALTHEAST (OUTPATIENT)
Dept: LAB | Facility: CLINIC | Age: 70
End: 2020-07-29

## 2020-07-29 LAB
ALBUMIN SERPL-MCNC: 2.3 G/DL (ref 3.5–5)
ALP SERPL-CCNC: 140 U/L (ref 45–120)
ALT SERPL W P-5'-P-CCNC: 14 U/L (ref 0–45)
ANION GAP SERPL CALCULATED.3IONS-SCNC: 8 MMOL/L (ref 5–18)
AST SERPL W P-5'-P-CCNC: 31 U/L (ref 0–40)
BASOPHILS # BLD AUTO: 0.1 THOU/UL (ref 0–0.2)
BASOPHILS NFR BLD AUTO: 1 % (ref 0–2)
BILIRUB SERPL-MCNC: 0.9 MG/DL (ref 0–1)
BUN SERPL-MCNC: 15 MG/DL (ref 8–22)
CALCIUM SERPL-MCNC: 8.5 MG/DL (ref 8.5–10.5)
CHLORIDE BLD-SCNC: 111 MMOL/L (ref 98–107)
CO2 SERPL-SCNC: 25 MMOL/L (ref 22–31)
CREAT SERPL-MCNC: 1.74 MG/DL (ref 0.7–1.3)
EOSINOPHIL # BLD AUTO: 0.1 THOU/UL (ref 0–0.4)
EOSINOPHIL NFR BLD AUTO: 2 % (ref 0–6)
ERYTHROCYTE [DISTWIDTH] IN BLOOD BY AUTOMATED COUNT: 14.2 % (ref 11–14.5)
GFR SERPL CREATININE-BSD FRML MDRD: 39 ML/MIN/1.73M2
GLUCOSE BLD-MCNC: 152 MG/DL (ref 70–125)
HBA1C MFR BLD: 6.8 % (ref 4.2–6.1)
HCT VFR BLD AUTO: 41.4 % (ref 40–54)
HGB BLD-MCNC: 14.3 G/DL (ref 14–18)
LYMPHOCYTES # BLD AUTO: 1.5 THOU/UL (ref 0.8–4.4)
LYMPHOCYTES NFR BLD AUTO: 28 % (ref 20–40)
MCH RBC QN AUTO: 31.2 PG (ref 27–34)
MCHC RBC AUTO-ENTMCNC: 34.5 G/DL (ref 32–36)
MCV RBC AUTO: 90 FL (ref 80–100)
MONOCYTES # BLD AUTO: 0.6 THOU/UL (ref 0–0.9)
MONOCYTES NFR BLD AUTO: 11 % (ref 2–10)
NEUTROPHILS # BLD AUTO: 3.1 THOU/UL (ref 2–7.7)
NEUTROPHILS NFR BLD AUTO: 58 % (ref 50–70)
PLATELET # BLD AUTO: 92 THOU/UL (ref 140–440)
PMV BLD AUTO: ABNORMAL FL
POTASSIUM BLD-SCNC: 4.1 MMOL/L (ref 3.5–5)
PROT SERPL-MCNC: 5.7 G/DL (ref 6–8)
RBC # BLD AUTO: 4.58 MILL/UL (ref 4.4–6.2)
SODIUM SERPL-SCNC: 144 MMOL/L (ref 136–145)
WBC: 5.3 THOU/UL (ref 4–11)

## 2020-08-10 ENCOUNTER — AMBULATORY - HEALTHEAST (OUTPATIENT)
Dept: PHYSICAL MEDICINE AND REHAB | Facility: CLINIC | Age: 70
End: 2020-08-10

## 2020-08-10 DIAGNOSIS — M54.2 CHRONIC NECK PAIN: ICD-10-CM

## 2020-08-10 DIAGNOSIS — G89.29 CHRONIC NECK PAIN: ICD-10-CM

## 2020-08-10 DIAGNOSIS — M54.50 CHRONIC LOW BACK PAIN: ICD-10-CM

## 2020-08-10 DIAGNOSIS — G89.29 CHRONIC LOW BACK PAIN: ICD-10-CM

## 2020-08-18 ENCOUNTER — RECORDS - HEALTHEAST (OUTPATIENT)
Dept: RADIOLOGY | Facility: CLINIC | Age: 70
End: 2020-08-18

## 2020-08-31 ENCOUNTER — HOSPITAL ENCOUNTER (OUTPATIENT)
Dept: PHYSICAL MEDICINE AND REHAB | Facility: CLINIC | Age: 70
Discharge: HOME | End: 2020-08-31
Attending: FAMILY MEDICINE

## 2020-08-31 DIAGNOSIS — R26.89 IMBALANCE: ICD-10-CM

## 2020-08-31 DIAGNOSIS — M54.16 LUMBAR RADICULITIS: ICD-10-CM

## 2020-08-31 DIAGNOSIS — M48.02 CERVICAL STENOSIS OF SPINE: ICD-10-CM

## 2020-08-31 DIAGNOSIS — Z98.890 H/O CERVICAL SPINE SURGERY: ICD-10-CM

## 2020-08-31 DIAGNOSIS — M48.061 SPINAL STENOSIS OF LUMBAR REGION WITHOUT NEUROGENIC CLAUDICATION: ICD-10-CM

## 2020-08-31 DIAGNOSIS — R29.2 HYPERREFLEXIA: ICD-10-CM

## 2020-08-31 ASSESSMENT — MIFFLIN-ST. JEOR: SCORE: 1573.21

## 2020-09-02 ENCOUNTER — COMMUNICATION - HEALTHEAST (OUTPATIENT)
Dept: PHYSICAL MEDICINE AND REHAB | Facility: CLINIC | Age: 70
End: 2020-09-02

## 2020-09-23 ENCOUNTER — HOSPITAL ENCOUNTER (OUTPATIENT)
Dept: CT IMAGING | Facility: CLINIC | Age: 70
Discharge: HOME | End: 2020-09-23
Attending: PAIN MEDICINE

## 2020-09-23 DIAGNOSIS — R29.2 HYPERREFLEXIA: ICD-10-CM

## 2020-09-23 DIAGNOSIS — M48.061 SPINAL STENOSIS OF LUMBAR REGION WITHOUT NEUROGENIC CLAUDICATION: ICD-10-CM

## 2020-09-23 DIAGNOSIS — R26.89 IMBALANCE: ICD-10-CM

## 2020-09-23 DIAGNOSIS — Z98.890 H/O CERVICAL SPINE SURGERY: ICD-10-CM

## 2020-09-23 DIAGNOSIS — M54.16 LUMBAR RADICULITIS: ICD-10-CM

## 2020-09-23 DIAGNOSIS — M48.02 CERVICAL STENOSIS OF SPINE: ICD-10-CM

## 2020-09-25 ENCOUNTER — HOSPITAL ENCOUNTER (OUTPATIENT)
Dept: PHYSICAL MEDICINE AND REHAB | Facility: CLINIC | Age: 70
Discharge: HOME | End: 2020-09-25
Attending: PAIN MEDICINE

## 2020-09-25 DIAGNOSIS — M54.16 LUMBAR RADICULITIS: ICD-10-CM

## 2020-09-25 DIAGNOSIS — M48.02 CERVICAL STENOSIS OF SPINE: ICD-10-CM

## 2020-09-25 DIAGNOSIS — R29.2 HYPERREFLEXIA: ICD-10-CM

## 2020-09-25 DIAGNOSIS — Z98.890 H/O CERVICAL SPINE SURGERY: ICD-10-CM

## 2020-09-25 DIAGNOSIS — R26.89 IMBALANCE: ICD-10-CM

## 2020-09-25 DIAGNOSIS — M48.061 SPINAL STENOSIS OF LUMBAR REGION WITHOUT NEUROGENIC CLAUDICATION: ICD-10-CM

## 2020-10-29 ENCOUNTER — OFFICE VISIT (OUTPATIENT)
Dept: NEUROLOGY | Facility: CLINIC | Age: 70
End: 2020-10-29
Payer: COMMERCIAL

## 2020-10-29 VITALS
WEIGHT: 180 LBS | DIASTOLIC BLOOD PRESSURE: 88 MMHG | BODY MASS INDEX: 26.66 KG/M2 | HEART RATE: 60 BPM | SYSTOLIC BLOOD PRESSURE: 165 MMHG | HEIGHT: 69 IN

## 2020-10-29 DIAGNOSIS — M48.062 SPINAL STENOSIS OF LUMBAR REGION WITH NEUROGENIC CLAUDICATION: ICD-10-CM

## 2020-10-29 DIAGNOSIS — M48.02 CERVICAL STENOSIS OF SPINAL CANAL: ICD-10-CM

## 2020-10-29 DIAGNOSIS — M54.16 LUMBAR RADICULOPATHY: ICD-10-CM

## 2020-10-29 DIAGNOSIS — G62.9 NEUROPATHY: Primary | ICD-10-CM

## 2020-10-29 DIAGNOSIS — G56.83: ICD-10-CM

## 2020-10-29 PROCEDURE — 99205 OFFICE O/P NEW HI 60 MIN: CPT | Performed by: PSYCHIATRY & NEUROLOGY

## 2020-10-29 RX ORDER — LISINOPRIL 20 MG/1
TABLET ORAL
COMMUNITY
Start: 2020-05-21 | End: 2022-08-15

## 2020-10-29 RX ORDER — ATORVASTATIN CALCIUM 40 MG/1
40 TABLET, FILM COATED ORAL
COMMUNITY
End: 2022-08-15

## 2020-10-29 RX ORDER — GLIPIZIDE 5 MG/1
TABLET, FILM COATED, EXTENDED RELEASE ORAL
COMMUNITY
Start: 2020-05-20 | End: 2022-08-15

## 2020-10-29 RX ORDER — LATANOPROST 50 UG/ML
1 SOLUTION/ DROPS OPHTHALMIC AT BEDTIME
COMMUNITY
Start: 2020-10-23

## 2020-10-29 RX ORDER — DONEPEZIL HYDROCHLORIDE 5 MG/1
5 TABLET, FILM COATED ORAL
COMMUNITY
End: 2022-08-15

## 2020-10-29 ASSESSMENT — MIFFLIN-ST. JEOR: SCORE: 1563.91

## 2020-10-29 NOTE — NURSING NOTE
Chief Complaint   Patient presents with     Nerve damage     Nini Watson CMA on 10/29/2020 at 10:59 AM
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
## 2020-11-09 ENCOUNTER — RECORDS - HEALTHEAST (OUTPATIENT)
Dept: LAB | Facility: HOSPITAL | Age: 70
End: 2020-11-09

## 2020-11-09 LAB
BASOPHILS # BLD AUTO: 0 THOU/UL (ref 0–0.2)
BASOPHILS NFR BLD AUTO: 1 % (ref 0–2)
EOSINOPHIL # BLD AUTO: 0.2 THOU/UL (ref 0–0.4)
EOSINOPHIL NFR BLD AUTO: 3 % (ref 0–6)
ERYTHROCYTE [DISTWIDTH] IN BLOOD BY AUTOMATED COUNT: 14.6 % (ref 11–14.5)
ERYTHROCYTE [SEDIMENTATION RATE] IN BLOOD BY WESTERGREN METHOD: 16 MM/HR (ref 0–15)
HBA1C MFR BLD: 8.4 %
HCT VFR BLD AUTO: 40.9 % (ref 40–54)
HGB BLD-MCNC: 14.2 G/DL (ref 14–18)
IMM GRANULOCYTES # BLD: 0 THOU/UL
IMM GRANULOCYTES NFR BLD: 0 %
LYMPHOCYTES # BLD AUTO: 1.9 THOU/UL (ref 0.8–4.4)
LYMPHOCYTES NFR BLD AUTO: 34 % (ref 20–40)
MCH RBC QN AUTO: 30.7 PG (ref 27–34)
MCHC RBC AUTO-ENTMCNC: 34.7 G/DL (ref 32–36)
MCV RBC AUTO: 88 FL (ref 80–100)
MONOCYTES # BLD AUTO: 0.6 THOU/UL (ref 0–0.9)
MONOCYTES NFR BLD AUTO: 11 % (ref 2–10)
NEUTROPHILS # BLD AUTO: 2.8 THOU/UL (ref 2–7.7)
NEUTROPHILS NFR BLD AUTO: 51 % (ref 50–70)
PLATELET # BLD AUTO: 113 THOU/UL (ref 140–440)
PMV BLD AUTO: ABNORMAL FL
RBC # BLD AUTO: 4.63 MILL/UL (ref 4.4–6.2)
TSH SERPL DL<=0.005 MIU/L-ACNC: 0.56 UIU/ML (ref 0.3–5)
VIT B12 SERPL-MCNC: 412 PG/ML (ref 213–816)
WBC: 5.4 THOU/UL (ref 4–11)

## 2020-11-10 LAB
ANA SER QL: 0.6 U
B BURGDOR IGG+IGM SER QL: 0.22 INDEX VALUE

## 2020-11-11 LAB
ALBUMIN PERCENT: 40 % (ref 51–67)
ALBUMIN SERPL ELPH-MCNC: 2.2 G/DL (ref 3.2–4.7)
ALPHA 1 PERCENT: 3.2 % (ref 2–4)
ALPHA 2 PERCENT: 14.5 % (ref 5–13)
ALPHA1 GLOB SERPL ELPH-MCNC: 0.2 G/DL (ref 0.1–0.3)
ALPHA2 GLOB SERPL ELPH-MCNC: 0.8 G/DL (ref 0.4–0.9)
B-GLOBULIN SERPL ELPH-MCNC: 0.7 G/DL (ref 0.7–1.2)
BETA PERCENT: 13.2 % (ref 10–17)
GAMMA GLOB SERPL ELPH-MCNC: 1.6 G/DL (ref 0.6–1.4)
GAMMA GLOBULIN PERCENT: 29.1 % (ref 9–20)
M PROTEIN SERPL ELPH-MCNC: 0.2 G/DL
METHYLMALONATE SERPL-SCNC: 0.22 UMOL/L (ref 0–0.4)
PATH ICD:: ABNORMAL
PROT PATTERN SERPL ELPH-IMP: ABNORMAL
PROT SERPL-MCNC: 5.4 G/DL (ref 6–8)
REVIEWING PATHOLOGIST: ABNORMAL

## 2020-11-12 LAB
CERULOPLASMIN SERPL-MCNC: 27 MG/DL (ref 20–60)
COPPER SERPL-MCNC: 112.1 UG/DL (ref 70–140)
VIT B1 PYROPHOSHATE BLD-SCNC: 78 NMOL/L (ref 70–180)

## 2020-12-07 ENCOUNTER — HOSPITAL ENCOUNTER (OUTPATIENT)
Dept: PHYSICAL MEDICINE AND REHAB | Facility: CLINIC | Age: 70
Discharge: HOME | End: 2020-12-07
Attending: PAIN MEDICINE

## 2020-12-07 DIAGNOSIS — M47.817 LUMBOSACRAL SPONDYLOSIS WITHOUT MYELOPATHY: ICD-10-CM

## 2020-12-07 DIAGNOSIS — M48.061 SPINAL STENOSIS OF LUMBAR REGION WITHOUT NEUROGENIC CLAUDICATION: ICD-10-CM

## 2020-12-07 DIAGNOSIS — R26.89 IMBALANCE: ICD-10-CM

## 2020-12-07 DIAGNOSIS — M54.16 LUMBAR RADICULITIS: ICD-10-CM

## 2020-12-07 DIAGNOSIS — Z98.890 H/O CERVICAL SPINE SURGERY: ICD-10-CM

## 2020-12-21 ENCOUNTER — HOSPITAL ENCOUNTER (OUTPATIENT)
Dept: PHYSICAL MEDICINE AND REHAB | Facility: CLINIC | Age: 70
Discharge: HOME | End: 2020-12-21
Attending: PAIN MEDICINE

## 2020-12-21 DIAGNOSIS — M47.817 LUMBOSACRAL SPONDYLOSIS WITHOUT MYELOPATHY: ICD-10-CM

## 2020-12-27 ENCOUNTER — HEALTH MAINTENANCE LETTER (OUTPATIENT)
Age: 70
End: 2020-12-27

## 2020-12-30 ENCOUNTER — COMMUNICATION - HEALTHEAST (OUTPATIENT)
Dept: PHYSICAL MEDICINE AND REHAB | Facility: CLINIC | Age: 70
End: 2020-12-30

## 2020-12-30 ENCOUNTER — AMBULATORY - HEALTHEAST (OUTPATIENT)
Dept: PHYSICAL MEDICINE AND REHAB | Facility: CLINIC | Age: 70
End: 2020-12-30

## 2020-12-30 ENCOUNTER — RECORDS - HEALTHEAST (OUTPATIENT)
Dept: LAB | Facility: CLINIC | Age: 70
End: 2020-12-30

## 2020-12-30 DIAGNOSIS — M47.816 SPONDYLOSIS OF LUMBAR REGION WITHOUT MYELOPATHY OR RADICULOPATHY: ICD-10-CM

## 2020-12-30 LAB
ALBUMIN SERPL-MCNC: 2.3 G/DL (ref 3.5–5)
ALP SERPL-CCNC: 125 U/L (ref 45–120)
ALT SERPL W P-5'-P-CCNC: 10 U/L (ref 0–45)
ANION GAP SERPL CALCULATED.3IONS-SCNC: 9 MMOL/L (ref 5–18)
AST SERPL W P-5'-P-CCNC: 18 U/L (ref 0–40)
BILIRUB SERPL-MCNC: 0.7 MG/DL (ref 0–1)
BUN SERPL-MCNC: 19 MG/DL (ref 8–22)
CALCIUM SERPL-MCNC: 8.7 MG/DL (ref 8.5–10.5)
CHLORIDE BLD-SCNC: 115 MMOL/L (ref 98–107)
CO2 SERPL-SCNC: 19 MMOL/L (ref 22–31)
CREAT SERPL-MCNC: 1.79 MG/DL (ref 0.7–1.3)
GFR SERPL CREATININE-BSD FRML MDRD: 38 ML/MIN/1.73M2
GLUCOSE BLD-MCNC: 147 MG/DL (ref 70–125)
POTASSIUM BLD-SCNC: 4.2 MMOL/L (ref 3.5–5)
PROT SERPL-MCNC: 6.1 G/DL (ref 6–8)
SODIUM SERPL-SCNC: 143 MMOL/L (ref 136–145)

## 2021-01-04 ENCOUNTER — TELEPHONE (OUTPATIENT)
Dept: NEUROLOGY | Facility: CLINIC | Age: 71
End: 2021-01-04

## 2021-01-04 DIAGNOSIS — G62.9 NEUROPATHY: Primary | ICD-10-CM

## 2021-01-04 NOTE — TELEPHONE ENCOUNTER
Patient still need to schedule EMG and follow up.    Component      Latest Ref Rng & Units 11/9/2020   WBC      4.0 - 11.0 thou/uL 5.4   RBC      4.40 - 6.20 mill/uL 4.63   Hemoglobin      14.0 - 18.0 g/dL 14.2   Hematocrit      40.0 - 54.0 % 40.9   MCV      80 - 100 fL 88   MCH      27.0 - 34.0 pg 30.7   MCHC      32.0 - 36.0 g/dL 34.7   RDW      11.0 - 14.5 % 14.6 (H)   Platelets      140 - 440 thou/uL 113 (L)   Neutrophils %      50 - 70 % 51   Lymphocytes %      20 - 40 % 34   Monocytes %      2 - 10 % 11 (H)   Eosinophils %      0 - 6 % 3   Basophils %      0 - 2 % 1   Immature Granulocyte %      <=0 % 0   Neutrophils Absolute      2.0 - 7.7 thou/uL 2.8   Lymphocytes Absolute      0.8 - 4.4 thou/uL 1.9   Monocytes Absolute      0.0 - 0.9 thou/uL 0.6   Eosinophils Absolute      0.0 - 0.4 thou/uL 0.2   Basophils Absolute      0.0 - 0.2 thou/uL 0.0   Immature Granulocyte Absolute      <=0.0 thou/uL 0.0   Albumin %      51.0 - 67.0 % 40.0 (L)   Albumin       3.2 - 4.7 g/dL 2.2 (L)   Alpha 1 %      2.0 - 4.0 % 3.2   Alpha 1      0.1 - 0.3 g/dL 0.2   Alpha 2 %      5.0 - 13.0 % 14.5 (H)   Alpha 2      0.4 - 0.9 g/dL 0.8   % Beta      10.0 - 17.0 % 13.2   Beta      0.7 - 1.2 g/dL 0.7   Gamma Globulin %      9.0 - 20.0 % 29.1 (H)   Gamma Globulin      0.6 - 1.4 g/dL 1.6 (H)   ELP Comment       Monoclonal peak detected in gamma region. Suggest immunofixation electrophoresis to further characterize.   Protein, Total      6.0 - 8.0 g/dL 5.4 (L)   Monoclonal Peak      g/dL 0.2   Path ICD:       G58.9   Interpreted By:       Jaena J Babs, MD   TSH      0.30 - 5.00 uIU/mL 0.56   Vitamin B-12      213 - 816 pg/mL 412   Sed Rate      0 - 15 mm/hr 16 (H)   Ceruloplasmin      20 - 60 mg/dL 27   Copper, Serum/Plasma      70.0 - 140.0 ug/dL 112.1   YENY Screen Mobile      <=2.9 U 0.6   Hemoglobin A1c      <=5.6 % 8.4 (H)   Vitamin B1, Whole Blood      70 - 180 nmol/L 78   Lyme Antibody Cascade      <0.90 Index Value 0.22    MMA Serum/Plasma, Vitamin B12 Status      0.00 - 0.40 umol/L 0.22

## 2021-01-08 NOTE — TELEPHONE ENCOUNTER
Pt is scheduled for EMG and follow up on 2/11/21. Informed patient he need to do another lab. Orders faxed to [facility].

## 2021-01-11 ENCOUNTER — RECORDS - HEALTHEAST (OUTPATIENT)
Dept: ADMINISTRATIVE | Facility: OTHER | Age: 71
End: 2021-01-11

## 2021-01-11 ENCOUNTER — TRANSFERRED RECORDS (OUTPATIENT)
Dept: HEALTH INFORMATION MANAGEMENT | Facility: CLINIC | Age: 71
End: 2021-01-11

## 2021-01-20 ENCOUNTER — RECORDS - HEALTHEAST (OUTPATIENT)
Dept: LAB | Facility: CLINIC | Age: 71
End: 2021-01-20

## 2021-01-20 LAB
SARS-COV-2 PCR COMMENT: NORMAL
SARS-COV-2 RNA SPEC QL NAA+PROBE: NEGATIVE
SARS-COV-2 VIRUS SPECIMEN SOURCE: NORMAL

## 2021-02-11 ENCOUNTER — RECORDS - HEALTHEAST (OUTPATIENT)
Dept: ADMINISTRATIVE | Facility: OTHER | Age: 71
End: 2021-02-11

## 2021-02-11 ENCOUNTER — OFFICE VISIT (OUTPATIENT)
Dept: NEUROLOGY | Facility: CLINIC | Age: 71
End: 2021-02-11
Attending: PSYCHIATRY & NEUROLOGY
Payer: COMMERCIAL

## 2021-02-11 ENCOUNTER — RECORDS - HEALTHEAST (OUTPATIENT)
Dept: SCHEDULING | Facility: CLINIC | Age: 71
End: 2021-02-11

## 2021-02-11 VITALS
HEART RATE: 61 BPM | WEIGHT: 180 LBS | BODY MASS INDEX: 26.66 KG/M2 | DIASTOLIC BLOOD PRESSURE: 116 MMHG | SYSTOLIC BLOOD PRESSURE: 211 MMHG | HEIGHT: 69 IN

## 2021-02-11 DIAGNOSIS — G62.9 NEUROPATHY: ICD-10-CM

## 2021-02-11 DIAGNOSIS — M48.02 CERVICAL STENOSIS OF SPINAL CANAL: ICD-10-CM

## 2021-02-11 DIAGNOSIS — G62.9 NEUROPATHY: Primary | ICD-10-CM

## 2021-02-11 DIAGNOSIS — M48.062 SPINAL STENOSIS OF LUMBAR REGION WITH NEUROGENIC CLAUDICATION: ICD-10-CM

## 2021-02-11 PROCEDURE — 95910 NRV CNDJ TEST 7-8 STUDIES: CPT | Performed by: PSYCHIATRY & NEUROLOGY

## 2021-02-11 PROCEDURE — 95886 MUSC TEST DONE W/N TEST COMP: CPT | Mod: RT | Performed by: PSYCHIATRY & NEUROLOGY

## 2021-02-11 PROCEDURE — 99215 OFFICE O/P EST HI 40 MIN: CPT | Performed by: PSYCHIATRY & NEUROLOGY

## 2021-02-11 ASSESSMENT — MIFFLIN-ST. JEOR: SCORE: 1558.91
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
## 2021-02-11 NOTE — LETTER
2/11/2021         RE: Javi Celaya  [address]  [place] [address]        Dear Colleague,    Thank you for referring your patient, Javi Celaya, to the [facility] [place]. Please see a copy of my visit note below.    See procedure note.       Again, thank you for allowing me to participate in the care of your patient.        Sincerely,        Jesus Abrams MD

## 2021-02-11 NOTE — NURSING NOTE
Chief Complaint   Patient presents with     Follow Up     NEUROPATHY     Nini Watson CMA on 2/11/2021 at 9:00 AM

## 2021-02-11 NOTE — PROCEDURES
[facility]     [facility]ly [facility]  [address], [address]  [place] [address]  Tel: [phone]  Fax: [phone]          Full Name: Javi Celaya Gender: Male  Patient ID: [id number] YOB: 1950      Visit Date: 2/11/2021 08:46  Age: 71 Years 0 Months Old  Interpreted By: Jesus Abrams MD   Ref Dr.: Natacha Payne MD  Tech: ST   Height: 5 feet 8 inch  Reason for referral: Evaluate bilateral lowers. c/o burning, pain, tingling, numbness in both legs/feet > 2 years. Diabetic > 30 years.       Motor NCS      Nerve / Sites Lat Amp Dist Jareth    ms mV cm m/s   L Peroneal - EDB * Atrophy      Ankle NR NR 8       Fib head NR NR 27 NR      Pop fossa NR NR 10 NR   R Peroneal - EDB *Atrophy      Ankle 5.16 1.2 8       Fib head 12.34 1.2 27 38      Pop fossa 15.42 1.2 10 33   L Tibial - AH      Ankle NR NR 8       Pop fossa NR NR 38 NR   R Tibial - AH      Ankle NR NR 8       Pop fossa NR NR 39 NR       Sensory NCS      Nerve / Sites Onset Lat Pk Lat Amp.2-3 Dist Jareth    ms ms  V cm m/s   L Sural - Ankle (Calf)      Calf NR NR NR 14 NR   R Sural - Ankle (Calf)      Calf NR NR NR 14 NR   L Superficial peroneal - Ankle      Lat leg NR NR NR 12 NR   R Superficial peroneal - Ankle      Lat leg NR NR NR 12 NR       EMG Summary Table     Spontaneous MUAP Rcmt Note   Muscle Fib PSW Fasc IA # Amp Dur PPP Rate Type   L. Gluteus medius None None None N N N N N N N   L. Gluteus felipe None None None N N N N N N N   L. L3 paraspinal None None None N N N N N N N   L. L4 paraspinal None None None N N N N N N N   L. L5 paraspinal None None None N N N N N N N   L. S1 paraspinal None None None N N N N N N N   L. Quadriceps None None None N N N N N N N   L. Adductor gold None None None N N N N N N N   L. Gastrocnemius (Medial head) None None None N - - - - - No units seen   L. Tibialis anterior None None None N 1 Lrg Unit Incr Incr N N N   L. Tibialis posterior  None None None  - - - - - No units seen   R. Adductor gold None None None N N N N N N N   R. Quadriceps None None None N N N N N N N   R. Gastrocnemius (Medial head) 1+ tiny 1+ tiny None N - - - - - No units seen   R. Tibialis anterior 1+ tiny 1+ tiny None N 1 Lrg Unit Incr Incr N N N   R. Tibialis posterior 1+ tiny 1+ tiny None N - - - - - No units seen     SUMMARY  Nerve conduction and EMG study of bilateral lower extremities shows:    Absent left peroneal CMAP.  Normal right peroneal distal motor latency with decreased amplitude and decreased conduction velocity.  Absent bilateral tibial CMAP.  Absent bilateral sural and bilateral superficial peroneal SNAP.  Monopolar needle exam as above.    CLINICAL INTERPRETATION:  This is an abnormal nerve conduction and EMG study.  The study suggestive of a severe sensorimotor polyneuropathy.  The study is unable to rule out an active radiculopathy due to the severity of the neuropathy.  Further clinical correlation is needed.     Jesus Abrams MD  Neurologist  [facility]  Tel:- [phone]

## 2021-02-17 ENCOUNTER — RECORDS - HEALTHEAST (OUTPATIENT)
Dept: LAB | Facility: CLINIC | Age: 71
End: 2021-02-17

## 2021-04-24 ENCOUNTER — HEALTH MAINTENANCE LETTER (OUTPATIENT)
Age: 71
End: 2021-04-24

## 2021-04-30 ENCOUNTER — RECORDS - HEALTHEAST (OUTPATIENT)
Dept: LAB | Facility: CLINIC | Age: 71
End: 2021-04-30

## 2021-05-04 LAB
BACTERIA SPEC CULT: ABNORMAL
BACTERIA SPEC CULT: ABNORMAL

## 2021-06-01 VITALS — BODY MASS INDEX: 25.76 KG/M2 | WEIGHT: 170 LBS | HEIGHT: 68 IN

## 2021-06-04 VITALS — BODY MASS INDEX: 29.57 KG/M2 | HEIGHT: 67 IN | WEIGHT: 188.4 LBS

## 2021-06-11 NOTE — PATIENT INSTRUCTIONS - HE
I have ordered a consult to neurology for you.    We will call physical therapy and asked them to give you a call to get you set up for physical therapy sessions.    ~Please call Nurse Navigation line [phone] with any questions or concerns about your treatment plan, if symptoms worsen and you would like to be seen urgently, or if you have problems controlling bladder and bowel function.
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
## 2021-06-11 NOTE — TELEPHONE ENCOUNTER
Central PA team  [phone]  Pool: HE PA MED ([id number])          PA has been initiated.       PA form completed and faxed insurance via Cover My Meds     Key:  [id number] - PA Case ID: [id number]     Medication:  Cyclobenzaprine HCl 5MG tablets    Insurance:  [employer]         Response will be received via fax and may take up to 5-10 business days depending on plan

## 2021-06-11 NOTE — PATIENT INSTRUCTIONS - HE
Discussed the importance of core strengthening, ROM, stretching exercises with the patient and how each of these entities is important in decreasing pain.  Explained to the patient that the purpose of physical therapy is to teach the patient a home exercise program.  These exercises need to be performed every day in order to decrease pain and prevent future occurrences of pain.        I have ordered CTs of your lumbar and cervical spine.  Once I reviewed these images I will have 1 of our nurses call you to schedule you so we can look at these again together.    I refilled your cyclobenzaprine.    ~Please call Nurse Navigation line [phone] with any questions or concerns about your treatment plan, if symptoms worsen and you would like to be seen urgently, or if you have problems controlling bladder and bowel function.

## 2021-06-11 NOTE — TELEPHONE ENCOUNTER
Prior Authorization Request  Who s requesting:  Pharmacy  Pharmacy Name and Location: [facility]  [address] [place] [id number]  Medication Name: cyclobenzaprine 5mg tabs  Insurance Plan: [facility] MEDICARE   Insurance Member ID Number:  [id number]  CoverMyMeds Key:   Informed patient that prior authorizations can take up to 10 business days for response:    Okay to leave a detailed message:

## 2021-06-13 NOTE — PATIENT INSTRUCTIONS - HE
I ordered medial branch blocks for you.  You will need a  for these injections.    Please continue to do physical therapy.    ~Please call Nurse Navigation line [phone] with any questions or concerns about your treatment plan, if symptoms worsen and you would like to be seen urgently, or if you have problems controlling bladder and bowel function.

## 2021-06-13 NOTE — PATIENT INSTRUCTIONS - HE
Please complete your pain diary and return the diary to the Spine Center at your earliest convenience.  The [facility] Center will contact you to schedule your next appointment after your pain diary is reviewed by your doctor.  Thank you.    DISCHARGE INSTRUCTIONS    During office hours (8:00 a.m.- 4:00 p.m.) questions or concerns may be answered  by calling [facility]ine Center Navigation Nurses at  [phone].  Messages received after hours will be returned the following business day.      In the case of an emergency, please dial 911 or seek assistance at the nearest Emergency Room/Urgent Care facility.     All Patients:  ? You may experience an increase in your symptoms for the first 2 days, once the numbing medication wears off.    ? You may resume your regular medication, no pain medication until you have completed your diary    ? You may shower. No swimming, tub bath or hot tub for 24 hours; remove bandage after 4 hours    ? Continue your activities that can cause you pain to test the blocks.                         ? You should not drive for the next 3 to 5 hours (have someone drive you)           POSSIBLE PROCEDURE SIDE EFFECTS  -Call Spine Center if concerned-    Increased Pain  Increased numbness/tingling     Nausea/Vomiting  Bruising/bleeding at site (hematoma)             Swelling at site (edema) Headache  Difficulty walking  Infection        Fever greater than 100.5
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
## 2021-06-14 NOTE — TELEPHONE ENCOUNTER
Attempted to contact patient, but he was unreachable at this time.  A detailed message was left with recommendations by Dr. Hernandez to move forward with confirmatory bilateral L3, L4, L5 medial branch blocks based on his positive response to the initial [facility] that [facility]    The patient was encouraged to contact the [facility] care navigation team if he had any questions regarding this recommendation or the scheduling team if he was ready to move forward with scheduling.    Procedure requirements were detailed in the message that was left by this author.

## 2021-06-17 NOTE — TELEPHONE ENCOUNTER
Telephone Encounter by Lyssa Marlow at 9/3/2020  1:33 PM     Author: Lyssa Marlow Service: -- Author Type: --    Filed: 9/3/2020  1:34 PM Encounter Date: 9/2/2020 Status: Signed    : Lyssa Marlow APPROVED:    Approval start date: 08/04/2020  Approval end date:  09/03/2021    Pharmacy has been notified of approval and will contact patient when medication is ready for pickup.

## 2021-06-19 ENCOUNTER — HEALTH MAINTENANCE LETTER (OUTPATIENT)
Age: 71
End: 2021-06-19
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
## 2021-08-02 ENCOUNTER — LAB REQUISITION (OUTPATIENT)
Dept: LAB | Facility: CLINIC | Age: 71
End: 2021-08-02
Payer: COMMERCIAL

## 2021-08-02 LAB
ALBUMIN SERPL-MCNC: 2.9 G/DL (ref 3.5–5)
ALP SERPL-CCNC: 137 U/L (ref 45–120)
ALT SERPL W P-5'-P-CCNC: 28 U/L (ref 0–45)
ANION GAP SERPL CALCULATED.3IONS-SCNC: 8 MMOL/L (ref 5–18)
AST SERPL W P-5'-P-CCNC: 31 U/L (ref 0–40)
BILIRUB SERPL-MCNC: 1.6 MG/DL (ref 0–1)
BUN SERPL-MCNC: 36 MG/DL (ref 8–28)
CALCIUM SERPL-MCNC: 9.2 MG/DL (ref 8.5–10.5)
CHLORIDE BLD-SCNC: 112 MMOL/L (ref 98–107)
CHOLEST SERPL-MCNC: 172 MG/DL
CO2 SERPL-SCNC: 23 MMOL/L (ref 22–31)
CREAT SERPL-MCNC: 2.24 MG/DL (ref 0.7–1.3)
GFR SERPL CREATININE-BSD FRML MDRD: 29 ML/MIN/1.73M2
GLUCOSE BLD-MCNC: 110 MG/DL (ref 70–125)
HDLC SERPL-MCNC: 81 MG/DL
LDLC SERPL CALC-MCNC: 79 MG/DL
POTASSIUM BLD-SCNC: 4.7 MMOL/L (ref 3.5–5)
PROT SERPL-MCNC: 6.7 G/DL (ref 6–8)
SODIUM SERPL-SCNC: 143 MMOL/L (ref 136–145)
TRIGL SERPL-MCNC: 59 MG/DL

## 2021-08-02 PROCEDURE — 80061 LIPID PANEL: CPT | Performed by: FAMILY MEDICINE

## 2021-08-02 PROCEDURE — 87186 SC STD MICRODIL/AGAR DIL: CPT | Mod: ORL | Performed by: FAMILY MEDICINE

## 2021-08-02 PROCEDURE — 80053 COMPREHEN METABOLIC PANEL: CPT | Performed by: FAMILY MEDICINE

## 2021-08-06 LAB
BACTERIA UR CULT: ABNORMAL
BACTERIA UR CULT: ABNORMAL

## 2021-10-09 ENCOUNTER — HEALTH MAINTENANCE LETTER (OUTPATIENT)
Age: 71
End: 2021-10-09

## 2022-01-29 ENCOUNTER — HEALTH MAINTENANCE LETTER (OUTPATIENT)
Age: 72
End: 2022-01-29

## 2022-03-14 ENCOUNTER — LAB REQUISITION (OUTPATIENT)
Dept: LAB | Facility: CLINIC | Age: 72
End: 2022-03-14
Payer: COMMERCIAL

## 2022-03-14 LAB
ALBUMIN SERPL-MCNC: 2.5 G/DL (ref 3.5–5)
ALP SERPL-CCNC: 109 U/L (ref 45–120)
ALT SERPL W P-5'-P-CCNC: <9 U/L (ref 0–45)
ANION GAP SERPL CALCULATED.3IONS-SCNC: 6 MMOL/L (ref 5–18)
AST SERPL W P-5'-P-CCNC: 15 U/L (ref 0–40)
BILIRUB SERPL-MCNC: 0.6 MG/DL (ref 0–1)
BNP SERPL-MCNC: 353 PG/ML (ref 0–69)
BUN SERPL-MCNC: 24 MG/DL (ref 8–28)
CALCIUM SERPL-MCNC: 8.6 MG/DL (ref 8.5–10.5)
CHLORIDE BLD-SCNC: 114 MMOL/L (ref 98–107)
CO2 SERPL-SCNC: 21 MMOL/L (ref 22–31)
CREAT SERPL-MCNC: 2.8 MG/DL (ref 0.7–1.3)
GFR SERPL CREATININE-BSD FRML MDRD: 23 ML/MIN/1.73M2
GLUCOSE BLD-MCNC: 98 MG/DL (ref 70–125)
POTASSIUM BLD-SCNC: 5.7 MMOL/L (ref 3.5–5)
PROT SERPL-MCNC: 5.9 G/DL (ref 6–8)
SODIUM SERPL-SCNC: 141 MMOL/L (ref 136–145)
TSH SERPL DL<=0.005 MIU/L-ACNC: 0.68 UIU/ML (ref 0.3–5)

## 2022-03-14 PROCEDURE — 83880 ASSAY OF NATRIURETIC PEPTIDE: CPT | Mod: ORL | Performed by: FAMILY MEDICINE

## 2022-03-14 PROCEDURE — 87081 CULTURE SCREEN ONLY: CPT | Mod: ORL | Performed by: FAMILY MEDICINE

## 2022-03-14 PROCEDURE — 80053 COMPREHEN METABOLIC PANEL: CPT | Mod: ORL | Performed by: FAMILY MEDICINE

## 2022-03-14 PROCEDURE — 84443 ASSAY THYROID STIM HORMONE: CPT | Mod: ORL | Performed by: FAMILY MEDICINE

## 2022-03-17 LAB — BACTERIA SPEC CULT: NORMAL

## 2022-05-21 ENCOUNTER — HEALTH MAINTENANCE LETTER (OUTPATIENT)
Age: 72
End: 2022-05-21

## 2022-07-13 ENCOUNTER — LAB REQUISITION (OUTPATIENT)
Dept: LAB | Facility: CLINIC | Age: 72
End: 2022-07-13
Payer: COMMERCIAL

## 2022-07-13 DIAGNOSIS — E11.22 TYPE 2 DIABETES MELLITUS WITH DIABETIC CHRONIC KIDNEY DISEASE (H): ICD-10-CM

## 2022-07-13 LAB
ANION GAP SERPL CALCULATED.3IONS-SCNC: 11 MMOL/L (ref 7–15)
BUN SERPL-MCNC: 26.2 MG/DL (ref 8–23)
CALCIUM SERPL-MCNC: 9.4 MG/DL (ref 8.8–10.2)
CHLORIDE SERPL-SCNC: 114 MMOL/L (ref 98–107)
CREAT SERPL-MCNC: 2.33 MG/DL (ref 0.67–1.17)
DEPRECATED HCO3 PLAS-SCNC: 15 MMOL/L (ref 22–29)
GFR SERPL CREATININE-BSD FRML MDRD: 29 ML/MIN/1.73M2
GLUCOSE SERPL-MCNC: 144 MG/DL (ref 70–99)
POTASSIUM SERPL-SCNC: 5 MMOL/L (ref 3.4–5.3)
SODIUM SERPL-SCNC: 140 MMOL/L (ref 136–145)

## 2022-07-13 PROCEDURE — 80048 BASIC METABOLIC PNL TOTAL CA: CPT | Mod: ORL | Performed by: NURSE PRACTITIONER

## 2022-08-04 ENCOUNTER — LAB REQUISITION (OUTPATIENT)
Dept: LAB | Facility: CLINIC | Age: 72
End: 2022-08-04
Payer: COMMERCIAL

## 2022-08-04 DIAGNOSIS — Z01.818 ENCOUNTER FOR OTHER PREPROCEDURAL EXAMINATION: ICD-10-CM

## 2022-08-04 LAB
ALBUMIN SERPL BCG-MCNC: 3.9 G/DL (ref 3.5–5.2)
ALP SERPL-CCNC: 106 U/L (ref 40–129)
ALT SERPL W P-5'-P-CCNC: 12 U/L (ref 10–50)
ANION GAP SERPL CALCULATED.3IONS-SCNC: 9 MMOL/L (ref 7–15)
AST SERPL W P-5'-P-CCNC: 21 U/L (ref 10–50)
BILIRUB SERPL-MCNC: 0.5 MG/DL
BUN SERPL-MCNC: 23 MG/DL (ref 8–23)
CALCIUM SERPL-MCNC: 9.8 MG/DL (ref 8.8–10.2)
CHLORIDE SERPL-SCNC: 110 MMOL/L (ref 98–107)
CREAT SERPL-MCNC: 2.96 MG/DL (ref 0.67–1.17)
DEPRECATED HCO3 PLAS-SCNC: 20 MMOL/L (ref 22–29)
GFR SERPL CREATININE-BSD FRML MDRD: 22 ML/MIN/1.73M2
GLUCOSE SERPL-MCNC: 122 MG/DL (ref 70–99)
POTASSIUM SERPL-SCNC: 5 MMOL/L (ref 3.4–5.3)
PROT SERPL-MCNC: 7.1 G/DL (ref 6.4–8.3)
SODIUM SERPL-SCNC: 139 MMOL/L (ref 136–145)

## 2022-08-04 PROCEDURE — 80053 COMPREHEN METABOLIC PANEL: CPT | Mod: ORL | Performed by: FAMILY MEDICINE

## 2022-08-05 ENCOUNTER — LAB REQUISITION (OUTPATIENT)
Dept: LAB | Facility: CLINIC | Age: 72
End: 2022-08-05
Payer: COMMERCIAL

## 2022-08-05 DIAGNOSIS — Z01.812 ENCOUNTER FOR PREPROCEDURAL LABORATORY EXAMINATION: ICD-10-CM

## 2022-08-05 PROCEDURE — U0003 INFECTIOUS AGENT DETECTION BY NUCLEIC ACID (DNA OR RNA); SEVERE ACUTE RESPIRATORY SYNDROME CORONAVIRUS 2 (SARS-COV-2) (CORONAVIRUS DISEASE [COVID-19]), AMPLIFIED PROBE TECHNIQUE, MAKING USE OF HIGH THROUGHPUT TECHNOLOGIES AS DESCRIBED BY CMS-2020-01-R: HCPCS | Mod: ORL | Performed by: FAMILY MEDICINE

## 2022-08-06 LAB — SARS-COV-2 RNA RESP QL NAA+PROBE: NEGATIVE

## 2022-08-13 DIAGNOSIS — M54.16 LUMBAR RADICULOPATHY: Primary | ICD-10-CM

## 2022-08-13 RX ORDER — OXYCODONE HYDROCHLORIDE 5 MG/1
5-10 TABLET ORAL EVERY 4 HOURS PRN
Qty: 24 TABLET | Refills: 0 | Status: SHIPPED | OUTPATIENT
Start: 2022-08-13 | End: 2022-08-16

## 2022-08-13 NOTE — TELEPHONE ENCOUNTER
Newer resident for nursing home with recent decompression to spine from [facility].    Resident asking for the oxycodone as staff have used other PRNs.      No script from the hospital?    Sent 24 tab script over to [facility] pharmacy:  Oxycodone 5-10mg po every 4 hours prn    Electronically signed by Marcelle Sorenson RN, CNP

## 2022-08-14 VITALS
RESPIRATION RATE: 19 BRPM | HEART RATE: 100 BPM | SYSTOLIC BLOOD PRESSURE: 139 MMHG | HEIGHT: 69 IN | BODY MASS INDEX: 26.58 KG/M2 | DIASTOLIC BLOOD PRESSURE: 83 MMHG | TEMPERATURE: 98.3 F | OXYGEN SATURATION: 98 %

## 2022-08-14 PROBLEM — G56.23 ULNAR NEUROPATHY OF BOTH UPPER EXTREMITIES: Status: ACTIVE | Noted: 2018-12-04

## 2022-08-14 PROBLEM — G89.29 CHRONIC NECK PAIN: Status: ACTIVE | Noted: 2018-07-09

## 2022-08-14 PROBLEM — G56.02 CARPAL TUNNEL SYNDROME OF LEFT WRIST: Status: ACTIVE | Noted: 2018-12-04

## 2022-08-14 PROBLEM — M54.2 CHRONIC NECK PAIN: Status: ACTIVE | Noted: 2018-07-09

## 2022-08-14 PROBLEM — M54.42 CHRONIC BILATERAL LOW BACK PAIN WITH BILATERAL SCIATICA: Status: ACTIVE | Noted: 2017-04-06

## 2022-08-14 PROBLEM — R78.81 BACTEREMIA: Status: ACTIVE | Noted: 2022-05-15

## 2022-08-14 PROBLEM — M54.41 CHRONIC BILATERAL LOW BACK PAIN WITH BILATERAL SCIATICA: Status: ACTIVE | Noted: 2017-04-06

## 2022-08-14 PROBLEM — N10 ACUTE PYELONEPHRITIS: Status: ACTIVE | Noted: 2019-04-08

## 2022-08-15 ENCOUNTER — TRANSITIONAL CARE UNIT VISIT (OUTPATIENT)
Dept: GERIATRICS | Facility: CLINIC | Age: 72
End: 2022-08-15
Payer: COMMERCIAL

## 2022-08-15 ENCOUNTER — LAB REQUISITION (OUTPATIENT)
Dept: LAB | Facility: CLINIC | Age: 72
End: 2022-08-15
Payer: COMMERCIAL

## 2022-08-15 DIAGNOSIS — M47.896 OTHER OSTEOARTHRITIS OF SPINE, LUMBAR REGION: Primary | ICD-10-CM

## 2022-08-15 DIAGNOSIS — Z48.811 ENCOUNTER FOR SURGICAL AFTERCARE FOLLOWING SURGERY ON THE NERVOUS SYSTEM: ICD-10-CM

## 2022-08-15 DIAGNOSIS — M54.16 LUMBAR RADICULOPATHY: ICD-10-CM

## 2022-08-15 DIAGNOSIS — Z79.4 TYPE 2 DIABETES MELLITUS WITHOUT COMPLICATION, WITH LONG-TERM CURRENT USE OF INSULIN (H): ICD-10-CM

## 2022-08-15 DIAGNOSIS — E11.9 TYPE 2 DIABETES MELLITUS WITHOUT COMPLICATION, WITH LONG-TERM CURRENT USE OF INSULIN (H): ICD-10-CM

## 2022-08-15 DIAGNOSIS — N18.30 STAGE 3 CHRONIC KIDNEY DISEASE, UNSPECIFIED WHETHER STAGE 3A OR 3B CKD (H): ICD-10-CM

## 2022-08-15 DIAGNOSIS — Z98.890 HX OF DECOMPRESSIVE LUMBAR LAMINECTOMY: ICD-10-CM

## 2022-08-15 PROCEDURE — 99310 SBSQ NF CARE HIGH MDM 45: CPT | Performed by: FAMILY MEDICINE

## 2022-08-15 RX ORDER — LACTULOSE 10 G/15ML
30 SOLUTION ORAL 3 TIMES DAILY
COMMUNITY

## 2022-08-15 RX ORDER — AMLODIPINE BESYLATE 10 MG/1
10 TABLET ORAL DAILY
COMMUNITY
End: 2023-09-16

## 2022-08-15 RX ORDER — METOPROLOL SUCCINATE 25 MG/1
12.5 TABLET, EXTENDED RELEASE ORAL DAILY
COMMUNITY
End: 2023-09-16

## 2022-08-15 RX ORDER — FUROSEMIDE 20 MG
20 TABLET ORAL DAILY
COMMUNITY
End: 2023-09-16

## 2022-08-15 RX ORDER — GLIPIZIDE 5 MG/1
2.5 TABLET ORAL
COMMUNITY

## 2022-08-15 RX ORDER — CALCIUM CARBONATE/VITAMIN D3 500-10/5ML
400 LIQUID (ML) ORAL
COMMUNITY
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
## 2022-08-16 RX ORDER — OXYCODONE HYDROCHLORIDE 5 MG/1
5-10 TABLET ORAL EVERY 4 HOURS PRN
Qty: 24 TABLET | Refills: 0 | Status: SHIPPED | OUTPATIENT
Start: 2022-08-16 | End: 2022-08-18

## 2022-08-17 VITALS
HEIGHT: 69 IN | DIASTOLIC BLOOD PRESSURE: 57 MMHG | RESPIRATION RATE: 19 BRPM | SYSTOLIC BLOOD PRESSURE: 106 MMHG | BODY MASS INDEX: 24.38 KG/M2 | HEART RATE: 59 BPM | WEIGHT: 164.6 LBS | OXYGEN SATURATION: 98 % | TEMPERATURE: 98.3 F

## 2022-08-18 ENCOUNTER — TRANSITIONAL CARE UNIT VISIT (OUTPATIENT)
Dept: GERIATRICS | Facility: CLINIC | Age: 72
End: 2022-08-18

## 2022-08-18 DIAGNOSIS — I10 ESSENTIAL HYPERTENSION: ICD-10-CM

## 2022-08-18 DIAGNOSIS — K59.09 CHRONIC CONSTIPATION: ICD-10-CM

## 2022-08-18 DIAGNOSIS — E11.9 TYPE 2 DIABETES MELLITUS WITHOUT COMPLICATION, WITH LONG-TERM CURRENT USE OF INSULIN (H): ICD-10-CM

## 2022-08-18 DIAGNOSIS — Z79.4 TYPE 2 DIABETES MELLITUS WITHOUT COMPLICATION, WITH LONG-TERM CURRENT USE OF INSULIN (H): ICD-10-CM

## 2022-08-18 DIAGNOSIS — Z86.19 HISTORY OF HEPATITIS C: Primary | ICD-10-CM

## 2022-08-18 LAB
ALBUMIN SERPL BCG-MCNC: 3 G/DL (ref 3.5–5.2)
ALP SERPL-CCNC: 86 U/L (ref 40–129)
ALT SERPL W P-5'-P-CCNC: 9 U/L (ref 10–50)
ANION GAP SERPL CALCULATED.3IONS-SCNC: 11 MMOL/L (ref 7–15)
AST SERPL W P-5'-P-CCNC: 32 U/L (ref 10–50)
BILIRUB SERPL-MCNC: 0.6 MG/DL
BUN SERPL-MCNC: 25.2 MG/DL (ref 8–23)
CALCIUM SERPL-MCNC: 9.3 MG/DL (ref 8.8–10.2)
CHLORIDE SERPL-SCNC: 112 MMOL/L (ref 98–107)
CREAT SERPL-MCNC: 2.32 MG/DL (ref 0.67–1.17)
DEPRECATED CALCIDIOL+CALCIFEROL SERPL-MC: 48 UG/L (ref 20–75)
DEPRECATED HCO3 PLAS-SCNC: 20 MMOL/L (ref 22–29)
GFR SERPL CREATININE-BSD FRML MDRD: 29 ML/MIN/1.73M2
GLUCOSE SERPL-MCNC: 97 MG/DL (ref 70–99)
HBA1C MFR BLD: 5.6 %
HGB BLD-MCNC: 10.2 G/DL (ref 13.3–17.7)
POTASSIUM SERPL-SCNC: 5.1 MMOL/L (ref 3.4–5.3)
PROT SERPL-MCNC: 6.1 G/DL (ref 6.4–8.3)
SODIUM SERPL-SCNC: 143 MMOL/L (ref 136–145)
VIT B12 SERPL-MCNC: 1820 PG/ML (ref 232–1245)

## 2022-08-18 PROCEDURE — 80053 COMPREHEN METABOLIC PANEL: CPT | Mod: ORL

## 2022-08-18 PROCEDURE — P9604 ONE-WAY ALLOW PRORATED TRIP: HCPCS | Mod: ORL | Performed by: FAMILY MEDICINE

## 2022-08-18 PROCEDURE — 99316 NF DSCHRG MGMT 30 MIN+: CPT | Performed by: FAMILY MEDICINE

## 2022-08-18 PROCEDURE — P9604 ONE-WAY ALLOW PRORATED TRIP: HCPCS | Mod: ORL

## 2022-08-18 PROCEDURE — 82607 VITAMIN B-12: CPT | Mod: ORL | Performed by: FAMILY MEDICINE

## 2022-08-18 PROCEDURE — 83036 HEMOGLOBIN GLYCOSYLATED A1C: CPT | Mod: ORL | Performed by: FAMILY MEDICINE

## 2022-08-18 PROCEDURE — 36415 COLL VENOUS BLD VENIPUNCTURE: CPT | Mod: ORL

## 2022-08-18 PROCEDURE — 83036 HEMOGLOBIN GLYCOSYLATED A1C: CPT | Mod: ORL

## 2022-08-18 PROCEDURE — 82306 VITAMIN D 25 HYDROXY: CPT | Mod: ORL | Performed by: FAMILY MEDICINE

## 2022-08-18 PROCEDURE — 82607 VITAMIN B-12: CPT | Mod: ORL

## 2022-08-18 PROCEDURE — 82306 VITAMIN D 25 HYDROXY: CPT | Mod: ORL

## 2022-08-18 PROCEDURE — 80053 COMPREHEN METABOLIC PANEL: CPT | Mod: ORL | Performed by: FAMILY MEDICINE

## 2022-08-18 PROCEDURE — 85018 HEMOGLOBIN: CPT | Mod: ORL

## 2022-08-18 PROCEDURE — 85018 HEMOGLOBIN: CPT | Mod: ORL | Performed by: FAMILY MEDICINE

## 2022-08-18 PROCEDURE — 36415 COLL VENOUS BLD VENIPUNCTURE: CPT | Mod: ORL | Performed by: FAMILY MEDICINE
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
## 2022-08-24 ENCOUNTER — LAB REQUISITION (OUTPATIENT)
Dept: LAB | Facility: CLINIC | Age: 72
End: 2022-08-24
Payer: COMMERCIAL

## 2022-08-24 DIAGNOSIS — M48.062 SPINAL STENOSIS, LUMBAR REGION WITH NEUROGENIC CLAUDICATION: ICD-10-CM

## 2022-08-24 LAB
ALBUMIN SERPL BCG-MCNC: 3.3 G/DL (ref 3.5–5.2)
ALP SERPL-CCNC: 118 U/L (ref 40–129)
ALT SERPL W P-5'-P-CCNC: 12 U/L (ref 10–50)
ANION GAP SERPL CALCULATED.3IONS-SCNC: 15 MMOL/L (ref 7–15)
AST SERPL W P-5'-P-CCNC: 16 U/L (ref 10–50)
BILIRUB SERPL-MCNC: 0.4 MG/DL
BUN SERPL-MCNC: 21.4 MG/DL (ref 8–23)
CALCIUM SERPL-MCNC: 9 MG/DL (ref 8.8–10.2)
CHLORIDE SERPL-SCNC: 107 MMOL/L (ref 98–107)
CREAT SERPL-MCNC: 2.73 MG/DL (ref 0.67–1.17)
DEPRECATED HCO3 PLAS-SCNC: 16 MMOL/L (ref 22–29)
GFR SERPL CREATININE-BSD FRML MDRD: 24 ML/MIN/1.73M2
GLUCOSE SERPL-MCNC: 190 MG/DL (ref 70–99)
POTASSIUM SERPL-SCNC: 4.8 MMOL/L (ref 3.4–5.3)
PROT SERPL-MCNC: 6.3 G/DL (ref 6.4–8.3)
SODIUM SERPL-SCNC: 138 MMOL/L (ref 136–145)
VIT B12 SERPL-MCNC: 945 PG/ML (ref 232–1245)

## 2022-08-24 PROCEDURE — 82306 VITAMIN D 25 HYDROXY: CPT | Mod: ORL | Performed by: FAMILY MEDICINE

## 2022-08-24 PROCEDURE — 82607 VITAMIN B-12: CPT | Mod: ORL | Performed by: FAMILY MEDICINE

## 2022-08-24 PROCEDURE — 80053 COMPREHEN METABOLIC PANEL: CPT | Mod: ORL | Performed by: FAMILY MEDICINE

## 2022-08-25 LAB — DEPRECATED CALCIDIOL+CALCIFEROL SERPL-MC: 47 UG/L (ref 20–75)

## 2022-09-11 ENCOUNTER — HEALTH MAINTENANCE LETTER (OUTPATIENT)
Age: 72
End: 2022-09-11

## 2022-10-21 ENCOUNTER — LAB REQUISITION (OUTPATIENT)
Dept: LAB | Facility: CLINIC | Age: 72
End: 2022-10-21
Payer: COMMERCIAL

## 2022-10-21 DIAGNOSIS — N18.32 CHRONIC KIDNEY DISEASE, STAGE 3B (H): ICD-10-CM

## 2022-10-21 LAB
ANION GAP SERPL CALCULATED.3IONS-SCNC: 10 MMOL/L (ref 7–15)
BUN SERPL-MCNC: 38 MG/DL (ref 8–23)
CALCIUM SERPL-MCNC: 9.6 MG/DL (ref 8.8–10.2)
CHLORIDE SERPL-SCNC: 114 MMOL/L (ref 98–107)
CREAT SERPL-MCNC: 3.01 MG/DL (ref 0.67–1.17)
DEPRECATED HCO3 PLAS-SCNC: 14 MMOL/L (ref 22–29)
GFR SERPL CREATININE-BSD FRML MDRD: 21 ML/MIN/1.73M2
GLUCOSE SERPL-MCNC: 122 MG/DL (ref 70–99)
POTASSIUM SERPL-SCNC: 4.7 MMOL/L (ref 3.4–5.3)
SODIUM SERPL-SCNC: 138 MMOL/L (ref 136–145)

## 2022-10-21 PROCEDURE — 80048 BASIC METABOLIC PNL TOTAL CA: CPT | Mod: ORL | Performed by: NURSE PRACTITIONER

## 2022-10-31 ENCOUNTER — LAB REQUISITION (OUTPATIENT)
Dept: LAB | Facility: CLINIC | Age: 72
End: 2022-10-31
Payer: COMMERCIAL

## 2022-10-31 DIAGNOSIS — I10 ESSENTIAL (PRIMARY) HYPERTENSION: ICD-10-CM

## 2022-10-31 LAB
ANION GAP SERPL CALCULATED.3IONS-SCNC: 12 MMOL/L (ref 7–15)
BUN SERPL-MCNC: 38.6 MG/DL (ref 8–23)
CALCIUM SERPL-MCNC: 9.6 MG/DL (ref 8.8–10.2)
CHLORIDE SERPL-SCNC: 116 MMOL/L (ref 98–107)
CREAT SERPL-MCNC: 2.55 MG/DL (ref 0.67–1.17)
DEPRECATED HCO3 PLAS-SCNC: 17 MMOL/L (ref 22–29)
GFR SERPL CREATININE-BSD FRML MDRD: 26 ML/MIN/1.73M2
GLUCOSE SERPL-MCNC: 121 MG/DL (ref 70–99)
POTASSIUM SERPL-SCNC: 4.6 MMOL/L (ref 3.4–5.3)
SODIUM SERPL-SCNC: 145 MMOL/L (ref 136–145)

## 2022-10-31 PROCEDURE — 80048 BASIC METABOLIC PNL TOTAL CA: CPT | Mod: ORL | Performed by: NURSE PRACTITIONER

## 2023-02-22 ENCOUNTER — LAB REQUISITION (OUTPATIENT)
Dept: LAB | Facility: CLINIC | Age: 73
End: 2023-02-22
Payer: COMMERCIAL

## 2023-02-22 LAB
ALBUMIN SERPL BCG-MCNC: 3.3 G/DL (ref 3.5–5.2)
ALP SERPL-CCNC: 119 U/L (ref 40–129)
ALT SERPL W P-5'-P-CCNC: 13 U/L (ref 10–50)
ANION GAP SERPL CALCULATED.3IONS-SCNC: 11 MMOL/L (ref 7–15)
AST SERPL W P-5'-P-CCNC: 20 U/L (ref 10–50)
BASOPHILS # BLD AUTO: 0 10E3/UL (ref 0–0.2)
BASOPHILS NFR BLD AUTO: 1 %
BILIRUB SERPL-MCNC: 0.4 MG/DL
BUN SERPL-MCNC: 45.5 MG/DL (ref 8–23)
CALCIUM SERPL-MCNC: 9.2 MG/DL (ref 8.8–10.2)
CHLORIDE SERPL-SCNC: 116 MMOL/L (ref 98–107)
CREAT SERPL-MCNC: 3.48 MG/DL (ref 0.67–1.17)
DACRYOCYTES BLD QL SMEAR: SLIGHT
DEPRECATED HCO3 PLAS-SCNC: 17 MMOL/L (ref 22–29)
EOSINOPHIL # BLD AUTO: 0.1 10E3/UL (ref 0–0.7)
EOSINOPHIL NFR BLD AUTO: 2 %
ERYTHROCYTE [DISTWIDTH] IN BLOOD BY AUTOMATED COUNT: 15.3 % (ref 10–15)
GFR SERPL CREATININE-BSD FRML MDRD: 18 ML/MIN/1.73M2
GLUCOSE SERPL-MCNC: 70 MG/DL (ref 70–99)
HCT VFR BLD AUTO: 34.9 % (ref 40–53)
HGB BLD-MCNC: 11.8 G/DL (ref 13.3–17.7)
IMM GRANULOCYTES # BLD: 0 10E3/UL
IMM GRANULOCYTES NFR BLD: 0 %
LYMPHOCYTES # BLD AUTO: 2.1 10E3/UL (ref 0.8–5.3)
LYMPHOCYTES NFR BLD AUTO: 39 %
MCH RBC QN AUTO: 30.8 PG (ref 26.5–33)
MCHC RBC AUTO-ENTMCNC: 33.8 G/DL (ref 31.5–36.5)
MCV RBC AUTO: 91 FL (ref 78–100)
MONOCYTES # BLD AUTO: 0.6 10E3/UL (ref 0–1.3)
MONOCYTES NFR BLD AUTO: 11 %
NEUTROPHILS # BLD AUTO: 2.6 10E3/UL (ref 1.6–8.3)
NEUTROPHILS NFR BLD AUTO: 47 %
NRBC # BLD AUTO: 0 10E3/UL
NRBC BLD AUTO-RTO: 0 /100
PLAT MORPH BLD: ABNORMAL
PLATELET # BLD AUTO: 87 10E3/UL (ref 150–450)
POTASSIUM SERPL-SCNC: 5.6 MMOL/L (ref 3.4–5.3)
PROT SERPL-MCNC: 6.3 G/DL (ref 6.4–8.3)
RBC # BLD AUTO: 3.83 10E6/UL (ref 4.4–5.9)
RBC MORPH BLD: ABNORMAL
SODIUM SERPL-SCNC: 144 MMOL/L (ref 136–145)
WBC # BLD AUTO: 5.5 10E3/UL (ref 4–11)

## 2023-02-22 PROCEDURE — 80053 COMPREHEN METABOLIC PANEL: CPT | Mod: ORL | Performed by: FAMILY MEDICINE

## 2023-02-22 PROCEDURE — 85025 COMPLETE CBC W/AUTO DIFF WBC: CPT | Mod: ORL | Performed by: FAMILY MEDICINE

## 2023-05-06 ENCOUNTER — HEALTH MAINTENANCE LETTER (OUTPATIENT)
Age: 73
End: 2023-05-06

## 2023-06-03 ENCOUNTER — HEALTH MAINTENANCE LETTER (OUTPATIENT)
Age: 73
End: 2023-06-03

## 2023-07-07 ENCOUNTER — LAB REQUISITION (OUTPATIENT)
Dept: LAB | Facility: CLINIC | Age: 73
End: 2023-07-07
Payer: COMMERCIAL

## 2023-07-07 DIAGNOSIS — R76.12 NONSPECIFIC REACTION TO CELL MEDIATED IMMUNITY MEASUREMENT OF GAMMA INTERFERON ANTIGEN RESPONSE WITHOUT ACTIVE TUBERCULOSIS: ICD-10-CM

## 2023-07-09 PROCEDURE — 36415 COLL VENOUS BLD VENIPUNCTURE: CPT | Mod: ORL | Performed by: NURSE PRACTITIONER

## 2023-07-09 PROCEDURE — P9603 ONE-WAY ALLOW PRORATED MILES: HCPCS | Mod: ORL | Performed by: NURSE PRACTITIONER

## 2023-07-09 PROCEDURE — 86481 TB AG RESPONSE T-CELL SUSP: CPT | Mod: ORL | Performed by: NURSE PRACTITIONER

## 2023-07-10 LAB
GAMMA INTERFERON BACKGROUND BLD IA-ACNC: 0.08 IU/ML
M TB IFN-G BLD-IMP: NEGATIVE
M TB IFN-G CD4+ BCKGRND COR BLD-ACNC: 5.83 IU/ML
MITOGEN IGNF BCKGRD COR BLD-ACNC: 0.07 IU/ML
MITOGEN IGNF BCKGRD COR BLD-ACNC: 0.16 IU/ML
QUANTIFERON MITOGEN: 5.91 IU/ML
QUANTIFERON NIL TUBE: 0.08 IU/ML
QUANTIFERON TB1 TUBE: 0.15 IU/ML
QUANTIFERON TB2 TUBE: 0.24

## 2023-07-13 ENCOUNTER — LAB REQUISITION (OUTPATIENT)
Dept: LAB | Facility: CLINIC | Age: 73
End: 2023-07-13
Payer: COMMERCIAL

## 2023-07-13 DIAGNOSIS — E11.9 TYPE 2 DIABETES MELLITUS WITHOUT COMPLICATIONS (H): ICD-10-CM

## 2023-07-14 ENCOUNTER — LAB REQUISITION (OUTPATIENT)
Dept: LAB | Facility: CLINIC | Age: 73
End: 2023-07-14
Payer: COMMERCIAL

## 2023-07-14 DIAGNOSIS — N18.4 CHRONIC KIDNEY DISEASE, STAGE 4 (SEVERE) (H): ICD-10-CM

## 2023-07-14 DIAGNOSIS — Z48.00 ENCOUNTER FOR CHANGE OR REMOVAL OF NONSURGICAL WOUND DRESSING: ICD-10-CM

## 2023-07-14 DIAGNOSIS — K74.60 UNSPECIFIED CIRRHOSIS OF LIVER (H): ICD-10-CM

## 2023-07-14 LAB
ANION GAP SERPL CALCULATED.3IONS-SCNC: 10 MMOL/L (ref 7–15)
BUN SERPL-MCNC: 33.4 MG/DL (ref 8–23)
CALCIUM SERPL-MCNC: 8.8 MG/DL (ref 8.8–10.2)
CHLORIDE SERPL-SCNC: 110 MMOL/L (ref 98–107)
CREAT SERPL-MCNC: 3.46 MG/DL (ref 0.67–1.17)
DEPRECATED HCO3 PLAS-SCNC: 21 MMOL/L (ref 22–29)
ERYTHROCYTE [DISTWIDTH] IN BLOOD BY AUTOMATED COUNT: 13.6 % (ref 10–15)
GFR SERPL CREATININE-BSD FRML MDRD: 18 ML/MIN/1.73M2
GLUCOSE SERPL-MCNC: 206 MG/DL (ref 70–99)
HCT VFR BLD AUTO: 30.5 % (ref 40–53)
HGB BLD-MCNC: 10.1 G/DL (ref 13.3–17.7)
MCH RBC QN AUTO: 29.7 PG (ref 26.5–33)
MCHC RBC AUTO-ENTMCNC: 33.1 G/DL (ref 31.5–36.5)
MCV RBC AUTO: 90 FL (ref 78–100)
PLATELET # BLD AUTO: 137 10E3/UL (ref 150–450)
POTASSIUM SERPL-SCNC: 4.9 MMOL/L (ref 3.4–5.3)
RBC # BLD AUTO: 3.4 10E6/UL (ref 4.4–5.9)
SODIUM SERPL-SCNC: 141 MMOL/L (ref 136–145)
WBC # BLD AUTO: 6 10E3/UL (ref 4–11)

## 2023-07-14 PROCEDURE — 36415 COLL VENOUS BLD VENIPUNCTURE: CPT | Mod: ORL | Performed by: NURSE PRACTITIONER

## 2023-07-14 PROCEDURE — 85027 COMPLETE CBC AUTOMATED: CPT | Mod: ORL | Performed by: NURSE PRACTITIONER

## 2023-07-14 PROCEDURE — 80048 BASIC METABOLIC PNL TOTAL CA: CPT | Mod: ORL | Performed by: NURSE PRACTITIONER

## 2023-07-14 PROCEDURE — P9604 ONE-WAY ALLOW PRORATED TRIP: HCPCS | Mod: ORL | Performed by: NURSE PRACTITIONER

## 2023-07-17 LAB
ALBUMIN SERPL BCG-MCNC: 2.7 G/DL (ref 3.5–5.2)
ALP SERPL-CCNC: 140 U/L (ref 40–129)
ALT SERPL W P-5'-P-CCNC: 22 U/L (ref 0–70)
ANION GAP SERPL CALCULATED.3IONS-SCNC: 8 MMOL/L (ref 7–15)
AST SERPL W P-5'-P-CCNC: 18 U/L (ref 0–45)
BILIRUB DIRECT SERPL-MCNC: <0.2 MG/DL (ref 0–0.3)
BILIRUB SERPL-MCNC: 0.3 MG/DL
BUN SERPL-MCNC: 37.4 MG/DL (ref 8–23)
CALCIUM SERPL-MCNC: 8.7 MG/DL (ref 8.8–10.2)
CHLORIDE SERPL-SCNC: 113 MMOL/L (ref 98–107)
CREAT SERPL-MCNC: 3.83 MG/DL (ref 0.67–1.17)
DEPRECATED HCO3 PLAS-SCNC: 20 MMOL/L (ref 22–29)
ERYTHROCYTE [DISTWIDTH] IN BLOOD BY AUTOMATED COUNT: 13.9 % (ref 10–15)
GFR SERPL CREATININE-BSD FRML MDRD: 16 ML/MIN/1.73M2
GLUCOSE SERPL-MCNC: 199 MG/DL (ref 70–99)
HCT VFR BLD AUTO: 33.1 % (ref 40–53)
HGB BLD-MCNC: 10.7 G/DL (ref 13.3–17.7)
MCH RBC QN AUTO: 29.2 PG (ref 26.5–33)
MCHC RBC AUTO-ENTMCNC: 32.3 G/DL (ref 31.5–36.5)
MCV RBC AUTO: 90 FL (ref 78–100)
PLATELET # BLD AUTO: 134 10E3/UL (ref 150–450)
POTASSIUM SERPL-SCNC: 4.5 MMOL/L (ref 3.4–5.3)
PROT SERPL-MCNC: 6.2 G/DL (ref 6.4–8.3)
RBC # BLD AUTO: 3.66 10E6/UL (ref 4.4–5.9)
SODIUM SERPL-SCNC: 141 MMOL/L (ref 136–145)
WBC # BLD AUTO: 6.6 10E3/UL (ref 4–11)

## 2023-07-17 PROCEDURE — 85027 COMPLETE CBC AUTOMATED: CPT | Mod: ORL | Performed by: INTERNAL MEDICINE

## 2023-07-17 PROCEDURE — 36415 COLL VENOUS BLD VENIPUNCTURE: CPT | Mod: ORL | Performed by: INTERNAL MEDICINE

## 2023-07-17 PROCEDURE — P9604 ONE-WAY ALLOW PRORATED TRIP: HCPCS | Mod: ORL | Performed by: INTERNAL MEDICINE

## 2023-07-17 PROCEDURE — 82248 BILIRUBIN DIRECT: CPT | Mod: ORL | Performed by: INTERNAL MEDICINE

## 2023-07-26 ENCOUNTER — LAB REQUISITION (OUTPATIENT)
Dept: LAB | Facility: CLINIC | Age: 73
End: 2023-07-26
Payer: COMMERCIAL

## 2023-07-26 DIAGNOSIS — I10 ESSENTIAL (PRIMARY) HYPERTENSION: ICD-10-CM

## 2023-07-26 DIAGNOSIS — N17.9 ACUTE KIDNEY FAILURE, UNSPECIFIED (H): ICD-10-CM

## 2023-07-26 DIAGNOSIS — R73.9 HYPERGLYCEMIA, UNSPECIFIED: ICD-10-CM

## 2023-07-27 LAB
ANION GAP SERPL CALCULATED.3IONS-SCNC: 11 MMOL/L (ref 7–15)
BASOPHILS # BLD AUTO: 0 10E3/UL (ref 0–0.2)
BASOPHILS NFR BLD AUTO: 0 %
BUN SERPL-MCNC: 28 MG/DL (ref 8–23)
CALCIUM SERPL-MCNC: 9.2 MG/DL (ref 8.8–10.2)
CHLORIDE SERPL-SCNC: 111 MMOL/L (ref 98–107)
CREAT SERPL-MCNC: 3.01 MG/DL (ref 0.67–1.17)
DEPRECATED HCO3 PLAS-SCNC: 18 MMOL/L (ref 22–29)
EOSINOPHIL # BLD AUTO: 0.2 10E3/UL (ref 0–0.7)
EOSINOPHIL NFR BLD AUTO: 3 %
ERYTHROCYTE [DISTWIDTH] IN BLOOD BY AUTOMATED COUNT: 13.8 % (ref 10–15)
GFR SERPL CREATININE-BSD FRML MDRD: 21 ML/MIN/1.73M2
GLUCOSE SERPL-MCNC: 123 MG/DL (ref 70–99)
HCT VFR BLD AUTO: 32.2 % (ref 40–53)
HGB BLD-MCNC: 10.6 G/DL (ref 13.3–17.7)
IMM GRANULOCYTES # BLD: 0 10E3/UL
IMM GRANULOCYTES NFR BLD: 0 %
LYMPHOCYTES # BLD AUTO: 2.5 10E3/UL (ref 0.8–5.3)
LYMPHOCYTES NFR BLD AUTO: 37 %
MCH RBC QN AUTO: 29 PG (ref 26.5–33)
MCHC RBC AUTO-ENTMCNC: 32.9 G/DL (ref 31.5–36.5)
MCV RBC AUTO: 88 FL (ref 78–100)
MONOCYTES # BLD AUTO: 0.7 10E3/UL (ref 0–1.3)
MONOCYTES NFR BLD AUTO: 10 %
NEUTROPHILS # BLD AUTO: 3.3 10E3/UL (ref 1.6–8.3)
NEUTROPHILS NFR BLD AUTO: 50 %
NRBC # BLD AUTO: 0 10E3/UL
NRBC BLD AUTO-RTO: 0 /100
PLATELET # BLD AUTO: 145 10E3/UL (ref 150–450)
POTASSIUM SERPL-SCNC: 4.5 MMOL/L (ref 3.4–5.3)
RBC # BLD AUTO: 3.65 10E6/UL (ref 4.4–5.9)
SODIUM SERPL-SCNC: 140 MMOL/L (ref 136–145)
WBC # BLD AUTO: 6.7 10E3/UL (ref 4–11)

## 2023-07-27 PROCEDURE — P9604 ONE-WAY ALLOW PRORATED TRIP: HCPCS | Mod: ORL | Performed by: NURSE PRACTITIONER

## 2023-07-27 PROCEDURE — 36415 COLL VENOUS BLD VENIPUNCTURE: CPT | Mod: ORL | Performed by: NURSE PRACTITIONER

## 2023-07-27 PROCEDURE — 85025 COMPLETE CBC W/AUTO DIFF WBC: CPT | Mod: ORL | Performed by: NURSE PRACTITIONER

## 2023-07-27 PROCEDURE — 80048 BASIC METABOLIC PNL TOTAL CA: CPT | Mod: ORL | Performed by: NURSE PRACTITIONER

## 2023-07-28 ENCOUNTER — LAB REQUISITION (OUTPATIENT)
Dept: LAB | Facility: CLINIC | Age: 73
End: 2023-07-28
Payer: COMMERCIAL

## 2023-07-28 DIAGNOSIS — N39.0 URINARY TRACT INFECTION, SITE NOT SPECIFIED: ICD-10-CM

## 2023-07-28 LAB
ALBUMIN UR-MCNC: 600 MG/DL
APPEARANCE UR: ABNORMAL
BILIRUB UR QL STRIP: NEGATIVE
COLOR UR AUTO: ABNORMAL
GLUCOSE UR STRIP-MCNC: 70 MG/DL
HGB UR QL STRIP: ABNORMAL
KETONES UR STRIP-MCNC: NEGATIVE MG/DL
LEUKOCYTE ESTERASE UR QL STRIP: ABNORMAL
NITRATE UR QL: NEGATIVE
PH UR STRIP: 6.5 [PH] (ref 5–7)
RBC URINE: >182 /HPF
SP GR UR STRIP: 1.02 (ref 1–1.03)
UROBILINOGEN UR STRIP-MCNC: NORMAL MG/DL
WBC URINE: 0 /HPF

## 2023-07-28 PROCEDURE — 87086 URINE CULTURE/COLONY COUNT: CPT | Mod: ORL | Performed by: NURSE PRACTITIONER

## 2023-07-28 PROCEDURE — 81001 URINALYSIS AUTO W/SCOPE: CPT | Mod: ORL | Performed by: NURSE PRACTITIONER

## 2023-07-29 ENCOUNTER — LAB REQUISITION (OUTPATIENT)
Dept: LAB | Facility: CLINIC | Age: 73
End: 2023-07-29
Payer: COMMERCIAL

## 2023-07-29 DIAGNOSIS — I10 ESSENTIAL (PRIMARY) HYPERTENSION: ICD-10-CM

## 2023-07-29 DIAGNOSIS — R73.9 HYPERGLYCEMIA, UNSPECIFIED: ICD-10-CM

## 2023-07-29 DIAGNOSIS — N17.9 ACUTE KIDNEY FAILURE, UNSPECIFIED (H): ICD-10-CM

## 2023-07-29 LAB
ANION GAP SERPL CALCULATED.3IONS-SCNC: 11 MMOL/L (ref 7–15)
BUN SERPL-MCNC: 30.4 MG/DL (ref 8–23)
CALCIUM SERPL-MCNC: 9.2 MG/DL (ref 8.8–10.2)
CHLORIDE SERPL-SCNC: 110 MMOL/L (ref 98–107)
CREAT SERPL-MCNC: 3.1 MG/DL (ref 0.67–1.17)
DEPRECATED HCO3 PLAS-SCNC: 18 MMOL/L (ref 22–29)
ERYTHROCYTE [DISTWIDTH] IN BLOOD BY AUTOMATED COUNT: 13.9 % (ref 10–15)
GFR SERPL CREATININE-BSD FRML MDRD: 21 ML/MIN/1.73M2
GLUCOSE SERPL-MCNC: 273 MG/DL (ref 70–99)
HCT VFR BLD AUTO: 29.6 % (ref 40–53)
HGB BLD-MCNC: 10 G/DL (ref 13.3–17.7)
MAGNESIUM SERPL-MCNC: 2.2 MG/DL (ref 1.7–2.3)
MCH RBC QN AUTO: 30 PG (ref 26.5–33)
MCHC RBC AUTO-ENTMCNC: 33.8 G/DL (ref 31.5–36.5)
MCV RBC AUTO: 89 FL (ref 78–100)
PHOSPHATE SERPL-MCNC: 3.9 MG/DL (ref 2.5–4.5)
PLATELET # BLD AUTO: 155 10E3/UL (ref 150–450)
POTASSIUM SERPL-SCNC: 4.6 MMOL/L (ref 3.4–5.3)
RBC # BLD AUTO: 3.33 10E6/UL (ref 4.4–5.9)
SODIUM SERPL-SCNC: 139 MMOL/L (ref 136–145)
WBC # BLD AUTO: 6.4 10E3/UL (ref 4–11)

## 2023-07-29 PROCEDURE — 84100 ASSAY OF PHOSPHORUS: CPT | Mod: ORL | Performed by: NURSE PRACTITIONER

## 2023-07-29 PROCEDURE — 80048 BASIC METABOLIC PNL TOTAL CA: CPT | Mod: ORL | Performed by: NURSE PRACTITIONER

## 2023-07-29 PROCEDURE — 85027 COMPLETE CBC AUTOMATED: CPT | Mod: ORL | Performed by: NURSE PRACTITIONER

## 2023-07-29 PROCEDURE — 83735 ASSAY OF MAGNESIUM: CPT | Mod: ORL | Performed by: NURSE PRACTITIONER

## 2023-07-29 PROCEDURE — 36415 COLL VENOUS BLD VENIPUNCTURE: CPT | Mod: ORL | Performed by: NURSE PRACTITIONER

## 2023-07-29 PROCEDURE — P9603 ONE-WAY ALLOW PRORATED MILES: HCPCS | Mod: ORL | Performed by: NURSE PRACTITIONER

## 2023-07-30 LAB — BACTERIA UR CULT: NO GROWTH

## 2023-07-31 ENCOUNTER — LAB REQUISITION (OUTPATIENT)
Dept: LAB | Facility: CLINIC | Age: 73
End: 2023-07-31
Payer: COMMERCIAL

## 2023-07-31 DIAGNOSIS — D50.0 IRON DEFICIENCY ANEMIA SECONDARY TO BLOOD LOSS (CHRONIC): ICD-10-CM

## 2023-07-31 LAB
ANION GAP SERPL CALCULATED.3IONS-SCNC: 10 MMOL/L (ref 7–15)
BUN SERPL-MCNC: 29.2 MG/DL (ref 8–23)
CALCIUM SERPL-MCNC: 8.9 MG/DL (ref 8.8–10.2)
CHLORIDE SERPL-SCNC: 111 MMOL/L (ref 98–107)
CREAT SERPL-MCNC: 2.98 MG/DL (ref 0.67–1.17)
DEPRECATED HCO3 PLAS-SCNC: 19 MMOL/L (ref 22–29)
ERYTHROCYTE [DISTWIDTH] IN BLOOD BY AUTOMATED COUNT: 14 % (ref 10–15)
GFR SERPL CREATININE-BSD FRML MDRD: 22 ML/MIN/1.73M2
GLUCOSE SERPL-MCNC: 121 MG/DL (ref 70–99)
HCT VFR BLD AUTO: 27.4 % (ref 40–53)
HGB BLD-MCNC: 8.8 G/DL (ref 13.3–17.7)
MCH RBC QN AUTO: 29.1 PG (ref 26.5–33)
MCHC RBC AUTO-ENTMCNC: 32.1 G/DL (ref 31.5–36.5)
MCV RBC AUTO: 91 FL (ref 78–100)
PHOSPHATE SERPL-MCNC: 3.7 MG/DL (ref 2.5–4.5)
PLATELET # BLD AUTO: 113 10E3/UL (ref 150–450)
POTASSIUM SERPL-SCNC: 4.5 MMOL/L (ref 3.4–5.3)
RBC # BLD AUTO: 3.02 10E6/UL (ref 4.4–5.9)
SODIUM SERPL-SCNC: 140 MMOL/L (ref 136–145)
WBC # BLD AUTO: 6 10E3/UL (ref 4–11)

## 2023-07-31 PROCEDURE — 80048 BASIC METABOLIC PNL TOTAL CA: CPT | Mod: ORL | Performed by: INTERNAL MEDICINE

## 2023-07-31 PROCEDURE — 85027 COMPLETE CBC AUTOMATED: CPT | Mod: ORL | Performed by: INTERNAL MEDICINE

## 2023-07-31 PROCEDURE — 36415 COLL VENOUS BLD VENIPUNCTURE: CPT | Mod: ORL | Performed by: INTERNAL MEDICINE

## 2023-07-31 PROCEDURE — 84100 ASSAY OF PHOSPHORUS: CPT | Mod: ORL | Performed by: INTERNAL MEDICINE

## 2023-07-31 PROCEDURE — P9604 ONE-WAY ALLOW PRORATED TRIP: HCPCS | Mod: ORL | Performed by: INTERNAL MEDICINE

## 2023-08-01 LAB
ALBUMIN SERPL BCG-MCNC: 2.7 G/DL (ref 3.5–5.2)
ALP SERPL-CCNC: 102 U/L (ref 40–129)
ALT SERPL W P-5'-P-CCNC: 9 U/L (ref 0–70)
AST SERPL W P-5'-P-CCNC: 24 U/L (ref 0–45)
BILIRUB DIRECT SERPL-MCNC: <0.2 MG/DL (ref 0–0.3)
BILIRUB SERPL-MCNC: 0.3 MG/DL
ERYTHROCYTE [DISTWIDTH] IN BLOOD BY AUTOMATED COUNT: 13.9 % (ref 10–15)
FERRITIN SERPL-MCNC: 101 NG/ML (ref 31–409)
FOLATE SERPL-MCNC: 28.6 NG/ML (ref 4.6–34.8)
HCT VFR BLD AUTO: 24.9 % (ref 40–53)
HGB BLD-MCNC: 8.3 G/DL (ref 13.3–17.7)
IRON BINDING CAPACITY (ROCHE): 210 UG/DL (ref 240–430)
IRON SATN MFR SERPL: 28 % (ref 15–46)
IRON SERPL-MCNC: 58 UG/DL (ref 61–157)
MCH RBC QN AUTO: 30 PG (ref 26.5–33)
MCHC RBC AUTO-ENTMCNC: 33.3 G/DL (ref 31.5–36.5)
MCV RBC AUTO: 90 FL (ref 78–100)
PLATELET # BLD AUTO: 108 10E3/UL (ref 150–450)
PROT SERPL-MCNC: 5.4 G/DL (ref 6.4–8.3)
RBC # BLD AUTO: 2.77 10E6/UL (ref 4.4–5.9)
RETICS # AUTO: 0.05 10E6/UL (ref 0.03–0.1)
RETICS/RBC NFR AUTO: 1.7 % (ref 0.5–2)
WBC # BLD AUTO: 4.7 10E3/UL (ref 4–11)

## 2023-08-01 PROCEDURE — 82728 ASSAY OF FERRITIN: CPT | Mod: ORL | Performed by: INTERNAL MEDICINE

## 2023-08-01 PROCEDURE — P9604 ONE-WAY ALLOW PRORATED TRIP: HCPCS | Mod: ORL | Performed by: INTERNAL MEDICINE

## 2023-08-01 PROCEDURE — 82746 ASSAY OF FOLIC ACID SERUM: CPT | Mod: ORL | Performed by: INTERNAL MEDICINE

## 2023-08-01 PROCEDURE — 83550 IRON BINDING TEST: CPT | Mod: ORL | Performed by: INTERNAL MEDICINE

## 2023-08-01 PROCEDURE — 85027 COMPLETE CBC AUTOMATED: CPT | Mod: ORL | Performed by: INTERNAL MEDICINE

## 2023-08-01 PROCEDURE — 85045 AUTOMATED RETICULOCYTE COUNT: CPT | Mod: ORL | Performed by: INTERNAL MEDICINE

## 2023-08-01 PROCEDURE — 36415 COLL VENOUS BLD VENIPUNCTURE: CPT | Mod: ORL | Performed by: INTERNAL MEDICINE

## 2023-08-01 PROCEDURE — 80076 HEPATIC FUNCTION PANEL: CPT | Mod: ORL | Performed by: INTERNAL MEDICINE

## 2023-08-02 ENCOUNTER — LAB REQUISITION (OUTPATIENT)
Dept: LAB | Facility: CLINIC | Age: 73
End: 2023-08-02
Payer: COMMERCIAL

## 2023-08-02 DIAGNOSIS — D64.9 ANEMIA, UNSPECIFIED: ICD-10-CM

## 2023-08-03 LAB — HGB BLD-MCNC: 7.5 G/DL (ref 13.3–17.7)

## 2023-08-03 PROCEDURE — P9604 ONE-WAY ALLOW PRORATED TRIP: HCPCS | Mod: ORL | Performed by: INTERNAL MEDICINE

## 2023-08-03 PROCEDURE — 36415 COLL VENOUS BLD VENIPUNCTURE: CPT | Mod: ORL | Performed by: INTERNAL MEDICINE

## 2023-08-03 PROCEDURE — 85018 HEMOGLOBIN: CPT | Mod: ORL | Performed by: INTERNAL MEDICINE

## 2023-08-07 ENCOUNTER — LAB REQUISITION (OUTPATIENT)
Dept: LAB | Facility: CLINIC | Age: 73
End: 2023-08-07
Payer: COMMERCIAL

## 2023-08-07 DIAGNOSIS — G93.40 ENCEPHALOPATHY, UNSPECIFIED: ICD-10-CM

## 2023-08-08 LAB — HGB BLD-MCNC: 9.2 G/DL (ref 13.3–17.7)

## 2023-08-08 PROCEDURE — 85018 HEMOGLOBIN: CPT | Mod: ORL | Performed by: INTERNAL MEDICINE

## 2023-08-08 PROCEDURE — P9604 ONE-WAY ALLOW PRORATED TRIP: HCPCS | Mod: ORL | Performed by: INTERNAL MEDICINE

## 2023-08-08 PROCEDURE — 36415 COLL VENOUS BLD VENIPUNCTURE: CPT | Mod: ORL | Performed by: INTERNAL MEDICINE

## 2023-08-09 ENCOUNTER — LAB REQUISITION (OUTPATIENT)
Dept: LAB | Facility: CLINIC | Age: 73
End: 2023-08-09
Payer: COMMERCIAL

## 2023-08-09 DIAGNOSIS — G93.40 ENCEPHALOPATHY, UNSPECIFIED: ICD-10-CM

## 2023-08-10 LAB
ALBUMIN SERPL BCG-MCNC: 3.2 G/DL (ref 3.5–5.2)
ALP SERPL-CCNC: 120 U/L (ref 40–129)
ALT SERPL W P-5'-P-CCNC: <5 U/L (ref 0–70)
AST SERPL W P-5'-P-CCNC: 21 U/L (ref 0–45)
BILIRUB DIRECT SERPL-MCNC: <0.2 MG/DL (ref 0–0.3)
BILIRUB SERPL-MCNC: 0.3 MG/DL
NT-PROBNP SERPL-MCNC: 145 PG/ML (ref 0–900)
PROT SERPL-MCNC: 6.8 G/DL (ref 6.4–8.3)

## 2023-08-10 PROCEDURE — 80076 HEPATIC FUNCTION PANEL: CPT | Mod: ORL | Performed by: INTERNAL MEDICINE

## 2023-08-10 PROCEDURE — 36415 COLL VENOUS BLD VENIPUNCTURE: CPT | Mod: ORL | Performed by: INTERNAL MEDICINE

## 2023-08-10 PROCEDURE — 83880 ASSAY OF NATRIURETIC PEPTIDE: CPT | Mod: ORL | Performed by: INTERNAL MEDICINE

## 2023-08-12 ENCOUNTER — LAB REQUISITION (OUTPATIENT)
Dept: LAB | Facility: CLINIC | Age: 73
End: 2023-08-12
Payer: COMMERCIAL

## 2023-08-12 DIAGNOSIS — K70.30 ALCOHOLIC CIRRHOSIS OF LIVER WITHOUT ASCITES (H): ICD-10-CM

## 2023-08-12 DIAGNOSIS — D50.0 IRON DEFICIENCY ANEMIA SECONDARY TO BLOOD LOSS (CHRONIC): ICD-10-CM

## 2023-08-12 DIAGNOSIS — N18.4 CHRONIC KIDNEY DISEASE, STAGE 4 (SEVERE) (H): ICD-10-CM

## 2023-08-14 LAB
ALBUMIN SERPL BCG-MCNC: 3.2 G/DL (ref 3.5–5.2)
ALP SERPL-CCNC: 106 U/L (ref 40–129)
ALT SERPL W P-5'-P-CCNC: <5 U/L (ref 0–70)
ANION GAP SERPL CALCULATED.3IONS-SCNC: 11 MMOL/L (ref 7–15)
AST SERPL W P-5'-P-CCNC: 17 U/L (ref 0–45)
BILIRUB DIRECT SERPL-MCNC: <0.2 MG/DL (ref 0–0.3)
BILIRUB SERPL-MCNC: 0.3 MG/DL
BUN SERPL-MCNC: 41.6 MG/DL (ref 8–23)
CALCIUM SERPL-MCNC: 9.1 MG/DL (ref 8.8–10.2)
CHLORIDE SERPL-SCNC: 118 MMOL/L (ref 98–107)
CREAT SERPL-MCNC: 3.98 MG/DL (ref 0.67–1.17)
DEPRECATED HCO3 PLAS-SCNC: 17 MMOL/L (ref 22–29)
ERYTHROCYTE [DISTWIDTH] IN BLOOD BY AUTOMATED COUNT: 14.3 % (ref 10–15)
GFR SERPL CREATININE-BSD FRML MDRD: 15 ML/MIN/1.73M2
GLUCOSE SERPL-MCNC: 116 MG/DL (ref 70–99)
HCT VFR BLD AUTO: 26.8 % (ref 40–53)
HGB BLD-MCNC: 8.9 G/DL (ref 13.3–17.7)
MCH RBC QN AUTO: 29.8 PG (ref 26.5–33)
MCHC RBC AUTO-ENTMCNC: 33.2 G/DL (ref 31.5–36.5)
MCV RBC AUTO: 90 FL (ref 78–100)
PLATELET # BLD AUTO: 138 10E3/UL (ref 150–450)
POTASSIUM SERPL-SCNC: 4.6 MMOL/L (ref 3.4–5.3)
PROT SERPL-MCNC: 6.3 G/DL (ref 6.4–8.3)
RBC # BLD AUTO: 2.99 10E6/UL (ref 4.4–5.9)
SODIUM SERPL-SCNC: 146 MMOL/L (ref 136–145)
WBC # BLD AUTO: 6 10E3/UL (ref 4–11)

## 2023-08-14 PROCEDURE — 85027 COMPLETE CBC AUTOMATED: CPT | Mod: ORL | Performed by: INTERNAL MEDICINE

## 2023-08-14 PROCEDURE — P9603 ONE-WAY ALLOW PRORATED MILES: HCPCS | Mod: ORL | Performed by: INTERNAL MEDICINE

## 2023-08-14 PROCEDURE — 82248 BILIRUBIN DIRECT: CPT | Mod: ORL | Performed by: INTERNAL MEDICINE

## 2023-08-14 PROCEDURE — 36415 COLL VENOUS BLD VENIPUNCTURE: CPT | Mod: ORL | Performed by: INTERNAL MEDICINE

## 2023-08-25 ENCOUNTER — LAB REQUISITION (OUTPATIENT)
Dept: LAB | Facility: CLINIC | Age: 73
End: 2023-08-25
Payer: COMMERCIAL

## 2023-08-25 DIAGNOSIS — U07.1 COVID-19: ICD-10-CM

## 2023-08-25 LAB
ANION GAP SERPL CALCULATED.3IONS-SCNC: 10 MMOL/L (ref 7–15)
BUN SERPL-MCNC: 24.7 MG/DL (ref 8–23)
CALCIUM SERPL-MCNC: 8.3 MG/DL (ref 8.8–10.2)
CHLORIDE SERPL-SCNC: 111 MMOL/L (ref 98–107)
CREAT SERPL-MCNC: 3.43 MG/DL (ref 0.67–1.17)
DEPRECATED HCO3 PLAS-SCNC: 20 MMOL/L (ref 22–29)
ERYTHROCYTE [DISTWIDTH] IN BLOOD BY AUTOMATED COUNT: 14 % (ref 10–15)
GFR SERPL CREATININE-BSD FRML MDRD: 18 ML/MIN/1.73M2
GLUCOSE SERPL-MCNC: 146 MG/DL (ref 70–99)
HCT VFR BLD AUTO: 25.1 % (ref 40–53)
HGB BLD-MCNC: 8.3 G/DL (ref 13.3–17.7)
MCH RBC QN AUTO: 29.4 PG (ref 26.5–33)
MCHC RBC AUTO-ENTMCNC: 33.1 G/DL (ref 31.5–36.5)
MCV RBC AUTO: 89 FL (ref 78–100)
PLATELET # BLD AUTO: 109 10E3/UL (ref 150–450)
POTASSIUM SERPL-SCNC: 4.6 MMOL/L (ref 3.4–5.3)
RBC # BLD AUTO: 2.82 10E6/UL (ref 4.4–5.9)
SODIUM SERPL-SCNC: 141 MMOL/L (ref 136–145)
WBC # BLD AUTO: 5.2 10E3/UL (ref 4–11)

## 2023-08-25 PROCEDURE — P9603 ONE-WAY ALLOW PRORATED MILES: HCPCS | Mod: ORL | Performed by: NURSE PRACTITIONER

## 2023-08-25 PROCEDURE — 85027 COMPLETE CBC AUTOMATED: CPT | Mod: ORL | Performed by: NURSE PRACTITIONER

## 2023-08-25 PROCEDURE — 36415 COLL VENOUS BLD VENIPUNCTURE: CPT | Mod: ORL | Performed by: NURSE PRACTITIONER

## 2023-08-25 PROCEDURE — 80048 BASIC METABOLIC PNL TOTAL CA: CPT | Mod: ORL | Performed by: NURSE PRACTITIONER

## 2023-08-26 ENCOUNTER — LAB REQUISITION (OUTPATIENT)
Dept: LAB | Facility: CLINIC | Age: 73
End: 2023-08-26
Payer: COMMERCIAL

## 2023-08-26 DIAGNOSIS — U07.1 COVID-19: ICD-10-CM

## 2023-09-15 ENCOUNTER — HOSPITAL ENCOUNTER (INPATIENT)
Facility: CLINIC | Age: 73
LOS: 3 days | Discharge: HOME HEALTH | DRG: 699 | End: 2023-09-19
Attending: EMERGENCY MEDICINE | Admitting: INTERNAL MEDICINE
Payer: COMMERCIAL

## 2023-09-15 DIAGNOSIS — N39.0 URINARY TRACT INFECTION ASSOCIATED WITH INDWELLING URETHRAL CATHETER, INITIAL ENCOUNTER (H): ICD-10-CM

## 2023-09-15 DIAGNOSIS — N18.9 CHRONIC KIDNEY DISEASE, UNSPECIFIED CKD STAGE: ICD-10-CM

## 2023-09-15 DIAGNOSIS — L89.322 PRESSURE INJURY OF LEFT BUTTOCK, STAGE 2 (H): Primary | ICD-10-CM

## 2023-09-15 DIAGNOSIS — T83.511A URINARY TRACT INFECTION ASSOCIATED WITH INDWELLING URETHRAL CATHETER, INITIAL ENCOUNTER (H): ICD-10-CM

## 2023-09-15 DIAGNOSIS — M48.00 SPINAL STENOSIS, UNSPECIFIED SPINAL REGION: ICD-10-CM

## 2023-09-15 LAB
ALBUMIN UR-MCNC: 200 MG/DL
AMORPH CRY #/AREA URNS HPF: ABNORMAL /HPF
ANION GAP SERPL CALCULATED.3IONS-SCNC: 10 MMOL/L (ref 7–15)
APPEARANCE UR: ABNORMAL
BACTERIA #/AREA URNS HPF: ABNORMAL /HPF
BASOPHILS # BLD AUTO: 0 10E3/UL (ref 0–0.2)
BASOPHILS NFR BLD AUTO: 0 %
BILIRUB UR QL STRIP: NEGATIVE
BUN SERPL-MCNC: 28 MG/DL (ref 8–23)
CALCIUM SERPL-MCNC: 9.3 MG/DL (ref 8.8–10.2)
CHLORIDE SERPL-SCNC: 115 MMOL/L (ref 98–107)
COLOR UR AUTO: ABNORMAL
CREAT SERPL-MCNC: 3.32 MG/DL (ref 0.67–1.17)
DEPRECATED HCO3 PLAS-SCNC: 18 MMOL/L (ref 22–29)
EGFRCR SERPLBLD CKD-EPI 2021: 19 ML/MIN/1.73M2
EOSINOPHIL # BLD AUTO: 0.2 10E3/UL (ref 0–0.7)
EOSINOPHIL NFR BLD AUTO: 2 %
ERYTHROCYTE [DISTWIDTH] IN BLOOD BY AUTOMATED COUNT: 14.3 % (ref 10–15)
GLUCOSE SERPL-MCNC: 89 MG/DL (ref 70–99)
GLUCOSE UR STRIP-MCNC: NEGATIVE MG/DL
HCT VFR BLD AUTO: 27.5 % (ref 40–53)
HGB BLD-MCNC: 9.4 G/DL (ref 13.3–17.7)
HGB UR QL STRIP: ABNORMAL
IMM GRANULOCYTES # BLD: 0 10E3/UL
IMM GRANULOCYTES NFR BLD: 0 %
KETONES UR STRIP-MCNC: NEGATIVE MG/DL
LEUKOCYTE ESTERASE UR QL STRIP: ABNORMAL
LYMPHOCYTES # BLD AUTO: 2.8 10E3/UL (ref 0.8–5.3)
LYMPHOCYTES NFR BLD AUTO: 41 %
MCH RBC QN AUTO: 29.5 PG (ref 26.5–33)
MCHC RBC AUTO-ENTMCNC: 34.2 G/DL (ref 31.5–36.5)
MCV RBC AUTO: 86 FL (ref 78–100)
MONOCYTES # BLD AUTO: 0.9 10E3/UL (ref 0–1.3)
MONOCYTES NFR BLD AUTO: 14 %
NEUTROPHILS # BLD AUTO: 2.9 10E3/UL (ref 1.6–8.3)
NEUTROPHILS NFR BLD AUTO: 43 %
NITRATE UR QL: POSITIVE
NRBC # BLD AUTO: 0 10E3/UL
NRBC BLD AUTO-RTO: 0 /100
PH UR STRIP: 6.5 [PH] (ref 5–7)
PLATELET # BLD AUTO: 130 10E3/UL (ref 150–450)
POTASSIUM SERPL-SCNC: 4.8 MMOL/L (ref 3.4–5.3)
RBC # BLD AUTO: 3.19 10E6/UL (ref 4.4–5.9)
RBC URINE: >182 /HPF
SODIUM SERPL-SCNC: 143 MMOL/L (ref 136–145)
SP GR UR STRIP: 1.01 (ref 1–1.03)
UROBILINOGEN UR STRIP-MCNC: NORMAL MG/DL
WBC # BLD AUTO: 6.8 10E3/UL (ref 4–11)
WBC CLUMPS #/AREA URNS HPF: PRESENT /HPF
WBC URINE: 168 /HPF

## 2023-09-15 PROCEDURE — 36415 COLL VENOUS BLD VENIPUNCTURE: CPT | Performed by: EMERGENCY MEDICINE

## 2023-09-15 PROCEDURE — 51702 INSERT TEMP BLADDER CATH: CPT

## 2023-09-15 PROCEDURE — 99285 EMERGENCY DEPT VISIT HI MDM: CPT | Mod: 25

## 2023-09-15 PROCEDURE — 83036 HEMOGLOBIN GLYCOSYLATED A1C: CPT | Performed by: INTERNAL MEDICINE

## 2023-09-15 PROCEDURE — 87088 URINE BACTERIA CULTURE: CPT | Performed by: EMERGENCY MEDICINE

## 2023-09-15 PROCEDURE — 81001 URINALYSIS AUTO W/SCOPE: CPT | Performed by: EMERGENCY MEDICINE

## 2023-09-15 PROCEDURE — 80048 BASIC METABOLIC PNL TOTAL CA: CPT | Performed by: EMERGENCY MEDICINE

## 2023-09-15 PROCEDURE — 85014 HEMATOCRIT: CPT | Performed by: EMERGENCY MEDICINE

## 2023-09-15 ASSESSMENT — ACTIVITIES OF DAILY LIVING (ADL): ADLS_ACUITY_SCORE: 33

## 2023-09-16 PROBLEM — N39.0 URINARY TRACT INFECTION ASSOCIATED WITH INDWELLING URETHRAL CATHETER, INITIAL ENCOUNTER (H): Status: ACTIVE | Noted: 2023-09-16

## 2023-09-16 PROBLEM — T83.511A URINARY TRACT INFECTION ASSOCIATED WITH INDWELLING URETHRAL CATHETER, INITIAL ENCOUNTER (H): Status: ACTIVE | Noted: 2023-09-16

## 2023-09-16 LAB
ALBUMIN UR-MCNC: 200 MG/DL
ANION GAP SERPL CALCULATED.3IONS-SCNC: 9 MMOL/L (ref 7–15)
APPEARANCE UR: ABNORMAL
BILIRUB UR QL STRIP: NEGATIVE
BUN SERPL-MCNC: 27.4 MG/DL (ref 8–23)
CALCIUM SERPL-MCNC: 9.3 MG/DL (ref 8.8–10.2)
CHLORIDE SERPL-SCNC: 114 MMOL/L (ref 98–107)
COLOR UR AUTO: ABNORMAL
CREAT SERPL-MCNC: 3.14 MG/DL (ref 0.67–1.17)
DEPRECATED HCO3 PLAS-SCNC: 18 MMOL/L (ref 22–29)
EGFRCR SERPLBLD CKD-EPI 2021: 20 ML/MIN/1.73M2
GLUCOSE BLDC GLUCOMTR-MCNC: 114 MG/DL (ref 70–99)
GLUCOSE BLDC GLUCOMTR-MCNC: 116 MG/DL (ref 70–99)
GLUCOSE BLDC GLUCOMTR-MCNC: 126 MG/DL (ref 70–99)
GLUCOSE BLDC GLUCOMTR-MCNC: 137 MG/DL (ref 70–99)
GLUCOSE SERPL-MCNC: 107 MG/DL (ref 70–99)
GLUCOSE UR STRIP-MCNC: NEGATIVE MG/DL
HBA1C MFR BLD: 6.2 %
HGB UR QL STRIP: ABNORMAL
KETONES UR STRIP-MCNC: NEGATIVE MG/DL
LEUKOCYTE ESTERASE UR QL STRIP: ABNORMAL
NITRATE UR QL: NEGATIVE
PH UR STRIP: 7.5 [PH] (ref 5–7)
POTASSIUM SERPL-SCNC: 4.5 MMOL/L (ref 3.4–5.3)
RBC URINE: >182 /HPF
SODIUM SERPL-SCNC: 141 MMOL/L (ref 136–145)
SP GR UR STRIP: 1.01 (ref 1–1.03)
SQUAMOUS EPITHELIAL: 2 /HPF
UROBILINOGEN UR STRIP-MCNC: NORMAL MG/DL
WBC URINE: 15 /HPF

## 2023-09-16 PROCEDURE — 82962 GLUCOSE BLOOD TEST: CPT

## 2023-09-16 PROCEDURE — 250N000013 HC RX MED GY IP 250 OP 250 PS 637: Performed by: INTERNAL MEDICINE

## 2023-09-16 PROCEDURE — 250N000011 HC RX IP 250 OP 636: Performed by: EMERGENCY MEDICINE

## 2023-09-16 PROCEDURE — 99222 1ST HOSP IP/OBS MODERATE 55: CPT | Performed by: INTERNAL MEDICINE

## 2023-09-16 PROCEDURE — 120N000001 HC R&B MED SURG/OB

## 2023-09-16 PROCEDURE — 81001 URINALYSIS AUTO W/SCOPE: CPT | Performed by: EMERGENCY MEDICINE

## 2023-09-16 PROCEDURE — 99207 PR APP CREDIT; MD BILLING SHARED VISIT: CPT | Performed by: INTERNAL MEDICINE

## 2023-09-16 PROCEDURE — 36415 COLL VENOUS BLD VENIPUNCTURE: CPT | Performed by: INTERNAL MEDICINE

## 2023-09-16 PROCEDURE — 80048 BASIC METABOLIC PNL TOTAL CA: CPT | Performed by: INTERNAL MEDICINE

## 2023-09-16 PROCEDURE — 250N000011 HC RX IP 250 OP 636: Mod: JZ | Performed by: EMERGENCY MEDICINE

## 2023-09-16 PROCEDURE — 87186 SC STD MICRODIL/AGAR DIL: CPT | Performed by: EMERGENCY MEDICINE

## 2023-09-16 RX ORDER — FOLIC ACID 1 MG/1
1 TABLET ORAL DAILY
Status: DISCONTINUED | OUTPATIENT
Start: 2023-09-16 | End: 2023-09-19 | Stop reason: HOSPADM

## 2023-09-16 RX ORDER — PREGABALIN 25 MG/1
25 CAPSULE ORAL 2 TIMES DAILY
Status: DISCONTINUED | OUTPATIENT
Start: 2023-09-16 | End: 2023-09-19 | Stop reason: HOSPADM

## 2023-09-16 RX ORDER — SENNOSIDES 8.6 MG
2 TABLET ORAL 2 TIMES DAILY PRN
COMMUNITY

## 2023-09-16 RX ORDER — MEROPENEM 500 MG/1
500 INJECTION, POWDER, FOR SOLUTION INTRAVENOUS EVERY 12 HOURS
Status: DISCONTINUED | OUTPATIENT
Start: 2023-09-16 | End: 2023-09-18

## 2023-09-16 RX ORDER — AMLODIPINE BESYLATE 5 MG/1
5 TABLET ORAL ONCE
Status: COMPLETED | OUTPATIENT
Start: 2023-09-16 | End: 2023-09-16

## 2023-09-16 RX ORDER — ASPIRIN 81 MG/1
81 TABLET, CHEWABLE ORAL DAILY
Status: DISCONTINUED | OUTPATIENT
Start: 2023-09-16 | End: 2023-09-19 | Stop reason: HOSPADM

## 2023-09-16 RX ORDER — ASPIRIN 81 MG/1
81 TABLET, CHEWABLE ORAL DAILY
COMMUNITY

## 2023-09-16 RX ORDER — MAGNESIUM OXIDE 400 MG/1
400 TABLET ORAL AT BEDTIME
Status: DISCONTINUED | OUTPATIENT
Start: 2023-09-16 | End: 2023-09-19 | Stop reason: HOSPADM

## 2023-09-16 RX ORDER — LIDOCAINE 40 MG/G
CREAM TOPICAL
Status: DISCONTINUED | OUTPATIENT
Start: 2023-09-16 | End: 2023-09-19 | Stop reason: HOSPADM

## 2023-09-16 RX ORDER — CARVEDILOL 6.25 MG/1
6.25 TABLET ORAL 2 TIMES DAILY WITH MEALS
Status: DISCONTINUED | OUTPATIENT
Start: 2023-09-16 | End: 2023-09-18

## 2023-09-16 RX ORDER — BISACODYL 5 MG/1
5 TABLET, DELAYED RELEASE ORAL DAILY PRN
Status: DISCONTINUED | OUTPATIENT
Start: 2023-09-16 | End: 2023-09-19 | Stop reason: HOSPADM

## 2023-09-16 RX ORDER — AMLODIPINE BESYLATE 5 MG/1
5 TABLET ORAL DAILY
COMMUNITY

## 2023-09-16 RX ORDER — LACTULOSE 10 G/15ML
30 SOLUTION ORAL 2 TIMES DAILY
Status: DISCONTINUED | OUTPATIENT
Start: 2023-09-16 | End: 2023-09-16

## 2023-09-16 RX ORDER — MEROPENEM 1 G/1
1 INJECTION, POWDER, FOR SOLUTION INTRAVENOUS ONCE
Status: COMPLETED | OUTPATIENT
Start: 2023-09-16 | End: 2023-09-16

## 2023-09-16 RX ORDER — FOLIC ACID 1 MG/1
1 TABLET ORAL DAILY
COMMUNITY

## 2023-09-16 RX ORDER — CARVEDILOL 3.12 MG/1
3.12 TABLET ORAL 2 TIMES DAILY WITH MEALS
COMMUNITY

## 2023-09-16 RX ORDER — AMLODIPINE BESYLATE 10 MG/1
10 TABLET ORAL DAILY
Status: DISCONTINUED | OUTPATIENT
Start: 2023-09-17 | End: 2023-09-19 | Stop reason: HOSPADM

## 2023-09-16 RX ORDER — LANOLIN ALCOHOL/MO/W.PET/CERES
1000 CREAM (GRAM) TOPICAL DAILY
Status: DISCONTINUED | OUTPATIENT
Start: 2023-09-16 | End: 2023-09-19 | Stop reason: HOSPADM

## 2023-09-16 RX ORDER — LACTULOSE 10 G/15ML
30 SOLUTION ORAL 3 TIMES DAILY
Status: DISCONTINUED | OUTPATIENT
Start: 2023-09-16 | End: 2023-09-19 | Stop reason: HOSPADM

## 2023-09-16 RX ORDER — SODIUM BICARBONATE 650 MG/1
1300 TABLET ORAL 2 TIMES DAILY
COMMUNITY

## 2023-09-16 RX ORDER — POLYETHYLENE GLYCOL 3350 17 G/17G
1 POWDER, FOR SOLUTION ORAL DAILY
COMMUNITY

## 2023-09-16 RX ORDER — SODIUM BICARBONATE 650 MG/1
1300 TABLET ORAL 2 TIMES DAILY
Status: DISCONTINUED | OUTPATIENT
Start: 2023-09-16 | End: 2023-09-19 | Stop reason: HOSPADM

## 2023-09-16 RX ORDER — FUROSEMIDE 20 MG
20 TABLET ORAL DAILY
Status: DISCONTINUED | OUTPATIENT
Start: 2023-09-16 | End: 2023-09-16

## 2023-09-16 RX ORDER — CARVEDILOL 3.12 MG/1
3.12 TABLET ORAL 2 TIMES DAILY WITH MEALS
Status: DISCONTINUED | OUTPATIENT
Start: 2023-09-16 | End: 2023-09-16

## 2023-09-16 RX ORDER — NICOTINE POLACRILEX 4 MG
15-30 LOZENGE BUCCAL
Status: DISCONTINUED | OUTPATIENT
Start: 2023-09-16 | End: 2023-09-19 | Stop reason: HOSPADM

## 2023-09-16 RX ORDER — LANOLIN ALCOHOL/MO/W.PET/CERES
3 CREAM (GRAM) TOPICAL AT BEDTIME
COMMUNITY

## 2023-09-16 RX ORDER — MEROPENEM 500 MG/1
500 INJECTION, POWDER, FOR SOLUTION INTRAVENOUS EVERY 12 HOURS
Status: DISCONTINUED | OUTPATIENT
Start: 2023-09-16 | End: 2023-09-16

## 2023-09-16 RX ORDER — PREGABALIN 25 MG/1
25 CAPSULE ORAL 2 TIMES DAILY
COMMUNITY

## 2023-09-16 RX ORDER — DEXTROSE MONOHYDRATE 25 G/50ML
25-50 INJECTION, SOLUTION INTRAVENOUS
Status: DISCONTINUED | OUTPATIENT
Start: 2023-09-16 | End: 2023-09-19 | Stop reason: HOSPADM

## 2023-09-16 RX ORDER — AMLODIPINE BESYLATE 5 MG/1
5 TABLET ORAL DAILY
Status: DISCONTINUED | OUTPATIENT
Start: 2023-09-17 | End: 2023-09-16

## 2023-09-16 RX ORDER — AMLODIPINE BESYLATE 5 MG/1
10 TABLET ORAL DAILY
Status: DISCONTINUED | OUTPATIENT
Start: 2023-09-16 | End: 2023-09-16

## 2023-09-16 RX ORDER — ACETAMINOPHEN 325 MG/1
650 TABLET ORAL EVERY 8 HOURS PRN
COMMUNITY

## 2023-09-16 RX ORDER — LANOLIN ALCOHOL/MO/W.PET/CERES
100 CREAM (GRAM) TOPICAL DAILY
COMMUNITY

## 2023-09-16 RX ORDER — LATANOPROST 50 UG/ML
1 SOLUTION/ DROPS OPHTHALMIC AT BEDTIME
Status: DISCONTINUED | OUTPATIENT
Start: 2023-09-16 | End: 2023-09-19 | Stop reason: HOSPADM

## 2023-09-16 RX ORDER — SENNOSIDES 8.6 MG
2 TABLET ORAL 2 TIMES DAILY PRN
Status: DISCONTINUED | OUTPATIENT
Start: 2023-09-16 | End: 2023-09-19 | Stop reason: HOSPADM

## 2023-09-16 RX ADMIN — AMLODIPINE BESYLATE 5 MG: 5 TABLET ORAL at 18:04

## 2023-09-16 RX ADMIN — CARVEDILOL 6.25 MG: 6.25 TABLET, FILM COATED ORAL at 18:04

## 2023-09-16 RX ADMIN — PREGABALIN 25 MG: 25 CAPSULE ORAL at 20:44

## 2023-09-16 RX ADMIN — MAGNESIUM OXIDE TAB 400 MG (241.3 MG ELEMENTAL MG) 400 MG: 400 (241.3 MG) TAB at 21:34

## 2023-09-16 RX ADMIN — MEROPENEM 1 G: 1 INJECTION, POWDER, FOR SOLUTION INTRAVENOUS at 02:47

## 2023-09-16 RX ADMIN — MEROPENEM 500 MG: 500 INJECTION, POWDER, FOR SOLUTION INTRAVENOUS at 14:50

## 2023-09-16 RX ADMIN — THIAMINE HCL TAB 100 MG 100 MG: 100 TAB at 16:49

## 2023-09-16 RX ADMIN — SODIUM BICARBONATE 650 MG TABLET 1300 MG: at 20:44

## 2023-09-16 RX ADMIN — RIFAXIMIN 550 MG: 550 TABLET ORAL at 20:42

## 2023-09-16 RX ADMIN — FOLIC ACID 1 MG: 1 TABLET ORAL at 16:31

## 2023-09-16 RX ADMIN — LACTULOSE 30 G: 20 SOLUTION ORAL at 14:40

## 2023-09-16 RX ADMIN — CYANOCOBALAMIN TAB 1000 MCG 1000 MCG: 1000 TAB at 16:31

## 2023-09-16 RX ADMIN — LATANOPROST 1 DROP: 50 SOLUTION OPHTHALMIC at 21:34

## 2023-09-16 RX ADMIN — Medication 125 MCG: at 14:38

## 2023-09-16 RX ADMIN — ASPIRIN 81 MG CHEWABLE TABLET 81 MG: 81 TABLET CHEWABLE at 16:31

## 2023-09-16 ASSESSMENT — ACTIVITIES OF DAILY LIVING (ADL)
ADLS_ACUITY_SCORE: 51
ADLS_ACUITY_SCORE: 49
ADLS_ACUITY_SCORE: 49
ADLS_ACUITY_SCORE: 35
ADLS_ACUITY_SCORE: 51
ADLS_ACUITY_SCORE: 35
ADLS_ACUITY_SCORE: 33
ADLS_ACUITY_SCORE: 45
ADLS_ACUITY_SCORE: 51
ADLS_ACUITY_SCORE: 51

## 2023-09-16 NOTE — PROGRESS NOTES
[facility]    ED Boarding Nurse Handoff Addendum Report:    Date/time: 9/16/2023, 4:02 PM    Activity Level: assist of 2    Fall Risk: Yes:  bed/chair alarm on, nonskid shoes/slippers when out of bed, arm band in place, patient and family education, assistive device/personal items within reach, and activity supervised    Active Infusions: None    Current Meds Due: None    Current care needs: Infection control.on contact for ESBL in urine. Dx UTI. Bleeding precaution.    Oxygen requirements (liters/min and/or FiO2): none    Respiratory status: Room air    Vital signs (within last 30 minutes):    Vitals:    09/16/23 0646 09/16/23 0902 09/16/23 0940 09/16/23 1005   BP: (!) 164/81 (!) 197/101 (!) 187/96 (!) 169/91   BP Location:  Left arm Left arm Left arm   Pulse:  59 56 54   Resp:  16 16    Temp:  98.6  F (37  C)     TempSrc:  Oral     SpO2:  100% 100%    Weight:       Height:           Focused assessment within last 30 minutes:    Pt A&O x 4. VSS except BP elevated. On room air. LS clear and diminished. BS +. Has old surgical scar on abdomen. Denies pain, SOB and nausea. PIV SL. On contact for ESBL in urine. On Lactulose. Abx: Merrem 500 mg q12h.    ED Boarding Nurse name: Sari Pierre RN

## 2023-09-16 NOTE — PROGRESS NOTES
Patient Transfer Information  Patient connected to monitoring equipment on arrival: N/A     Patient connected to wall oxygen on arrival: N/A    Belongings: Transferred with patient    Safety check completed: Yes

## 2023-09-16 NOTE — ED TRIAGE NOTES
Pt has an indwelling Starkey catheter. States has had catheter for the past month. Notes that a couple of days ago, saw blood in the bag, but it cleared up on its own. Noticed blood in the catheter again last night. Blood has continued throughout today along with some blood clots. Pt denies pain. States starkey is draining well. Is not sure if he takes any blood thinners.

## 2023-09-16 NOTE — PROGRESS NOTES
No charge note:  Seen and examined.  I met this gentleman while he is sleeping and laying comfortably in bed boarding the emergency room while waiting for his admission bed  Reviewed his chart.  Demonstrating stable hemodynamics  Currently afebrile.  Not hypoxic  Elevated blood pressure levels.  Resume amlodipine and carvedilol home regimen  I resume his home regimen for hepatic encephalopathy prophylaxis with lactulose and rifaximin  H&P reviewed.  Currently on IV antibiotics.  We will follow cultures sent earlier  Stable CKD    Pierce
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
## 2023-09-16 NOTE — ED NOTES
"[facility]    ED Boarding Nurse Handoff Addendum Report:    Date/time: 9/16/2023, 6:36 AM    Activity Level: assist of 2 and walker    Fall Risk: Yes:  nonskid shoes/slippers when out of bed, assistive device/personal items within reach, activity supervised, and mobility aid in reach    Active Infusions: NA      Current Meds Due: insulin     Current care needs: catheter care; pt has chronic starkey and prefers leg bag.     Oxygen requirements (liters/min and/or FiO2): NA    Respiratory status: Room air    Vital signs (within last 30 minutes):    Vitals:    09/15/23 1940   BP: (!) 181/94   Pulse: 73   Resp: 16   Temp: 98  F (36.7  C)   TempSrc: Temporal   SpO2: 99%   Weight: 76.2 kg (168 lb)   Height: 1.753 m (5' 9\")       Focused assessment within last 30 minutes:    Pt sleeping, arouses to voice. Pt a/ox4. Pt denies any discomfort at this time. Pt continued to remove BP cuff throughout the night, but is agreeable and cooperative. Pt difficult IV start. Pt non labored with breathing, on RA.     ED Boarding Nurse name: Flora Mesa RN    "

## 2023-09-16 NOTE — PHARMACY-ADMISSION MEDICATION HISTORY
Pharmacist Admission Medication History    Admission medication history is complete. The information provided in this note is only as accurate as the sources available at the time of the update.    Medication reconciliation/reorder completed by provider prior to medication history? Yes    Information Source(s): Patient via in-person, [employer] fill history,  Discharge summary 8/28/2023,  Internal medicine office visit of 9/13/2023    Pertinent Information: patient states he has a home care nurse who sets up his pill box on Fridays but cannot provide any information on how to contact her.     Changes made to PTA medication list:  Added:             pregabalin - per 9/13/2023  office visit         Apap, asa, carvedilol, folic acid, melatonin, Miralax, rifaximin, senna, sodium bicarbonate, thiamine - per  discharge summary from [facility] of 8/28/2023.   Deleted: furosemide, metoprolol  Changed: glipizide daily to bid, magnesium oxide from daily to at bedtime prn    Medication Affordability:  Not including over the counter (OTC) medications, was there a time in the past 3 months when you did not take your medications as prescribed because of cost?: No    Allergies reviewed with patient and updates made in EHR: yes    Medication History Completed By: Gissell Rojas RPH 9/16/2023 1:10 PM    Prior to Admission medications    Medication Sig Last Dose Taking? Auth Provider Long Term End Date   acetaminophen (TYLENOL) 325 MG tablet Take 650 mg by mouth every 8 hours as needed Unknown Yes Unknown, Entered By History     amLODIPine (NORVASC) 5 MG tablet Take 5 mg by mouth daily 9/15/2023 Yes Unknown, Entered By History     aspirin (ASA) 81 MG chewable tablet Take 81 mg by mouth daily 9/15/2023 Yes Unknown, Entered By History     bisacodyl (DULCOLAX) 5 MG EC tablet Take 5 mg by mouth daily as needed for constipation Unknown Yes Reported, Patient     carvedilol (COREG) 3.125 MG  tablet Take 3.125 mg by mouth 2 times daily (with meals) 9/15/2023 at am Yes Unknown, Entered By History     cholecalciferol (VITAMIN D3) 5000 units (125 mcg) capsule Take 125 mcg by mouth daily 9/15/2023 Yes Reported, Patient     cyanocobalamin (VITAMIN B-12) 1000 MCG tablet Take 1,000 mcg by mouth daily 9/15/2023 Yes Reported, Patient     folic acid (FOLVITE) 1 MG tablet Take 1 mg by mouth daily 9/15/2023 Yes Unknown, Entered By History     glipiZIDE (GLUCOTROL) 5 MG tablet Take 2.5 mg by mouth 2 times daily (before meals) 9/15/2023 at am Yes Reported, Patient No    lactulose (CHRONULAC) 10 GM/15ML solution Take 30 g by mouth 3 times daily Unknown Yes Reported, Patient     latanoprost (XALATAN) 0.005 % ophthalmic solution Place 1 drop into both eyes At Bedtime Unknown Yes Reported, Patient Yes    magnesium oxide 400 MG CAPS Take 400 mg by mouth nightly as needed Unknown Yes Reported, Patient     melatonin 3 MG tablet Take 3 mg by mouth At Bedtime 9/14/2023 Yes Unknown, Entered By History     polyethylene glycol (MIRALAX) 17 g packet Take 1 packet by mouth daily 9/15/2023 Yes Unknown, Entered By History     pregabalin (LYRICA) 25 MG capsule Take 25 mg by mouth 2 times daily 9/15/2023 at am Yes Unknown, Entered By History No    rifaximin (XIFAXAN) 550 MG TABS tablet Take 200 mg by mouth 2 times daily 9/15/2023 at am Yes Unknown, Entered By History     sennosides (SENOKOT) 8.6 MG tablet Take 2 tablets by mouth 2 times daily as needed for constipation Unknown Yes Unknown, Entered By History     sodium bicarbonate 650 MG tablet Take 1,300 mg by mouth 2 times daily 9/15/2023 at am Yes Unknown, Entered By History     thiamine (B-1) 100 MG tablet Take 100 mg by mouth daily 9/15/2023 Yes Unknown, Entered By History

## 2023-09-16 NOTE — ED NOTES
Removal of pre-existing 16fr starkey catheter per MD & RN orders. Pt stated the pre-existing starkey was inserted about a month ago. Procedure completed following sterile procedure protocol.
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
## 2023-09-17 LAB
GLUCOSE BLDC GLUCOMTR-MCNC: 107 MG/DL (ref 70–99)
GLUCOSE BLDC GLUCOMTR-MCNC: 166 MG/DL (ref 70–99)
GLUCOSE BLDC GLUCOMTR-MCNC: 167 MG/DL (ref 70–99)
GLUCOSE BLDC GLUCOMTR-MCNC: 98 MG/DL (ref 70–99)
GLUCOSE BLDC GLUCOMTR-MCNC: 98 MG/DL (ref 70–99)

## 2023-09-17 PROCEDURE — 250N000013 HC RX MED GY IP 250 OP 250 PS 637: Performed by: INTERNAL MEDICINE

## 2023-09-17 PROCEDURE — 250N000011 HC RX IP 250 OP 636: Performed by: EMERGENCY MEDICINE

## 2023-09-17 PROCEDURE — 120N000001 HC R&B MED SURG/OB

## 2023-09-17 PROCEDURE — 99233 SBSQ HOSP IP/OBS HIGH 50: CPT | Performed by: INTERNAL MEDICINE

## 2023-09-17 RX ORDER — ACETAMINOPHEN 325 MG/1
975 TABLET ORAL 2 TIMES DAILY PRN
Status: DISCONTINUED | OUTPATIENT
Start: 2023-09-17 | End: 2023-09-19 | Stop reason: HOSPADM

## 2023-09-17 RX ORDER — NALOXONE HYDROCHLORIDE 0.4 MG/ML
0.2 INJECTION, SOLUTION INTRAMUSCULAR; INTRAVENOUS; SUBCUTANEOUS
Status: DISCONTINUED | OUTPATIENT
Start: 2023-09-17 | End: 2023-09-19 | Stop reason: HOSPADM

## 2023-09-17 RX ORDER — NALOXONE HYDROCHLORIDE 0.4 MG/ML
0.4 INJECTION, SOLUTION INTRAMUSCULAR; INTRAVENOUS; SUBCUTANEOUS
Status: DISCONTINUED | OUTPATIENT
Start: 2023-09-17 | End: 2023-09-19 | Stop reason: HOSPADM

## 2023-09-17 RX ORDER — OXYCODONE HYDROCHLORIDE 5 MG/1
5 TABLET ORAL EVERY 4 HOURS PRN
Status: DISCONTINUED | OUTPATIENT
Start: 2023-09-17 | End: 2023-09-19

## 2023-09-17 RX ADMIN — THIAMINE HCL TAB 100 MG 100 MG: 100 TAB at 08:46

## 2023-09-17 RX ADMIN — FOLIC ACID 1 MG: 1 TABLET ORAL at 08:45

## 2023-09-17 RX ADMIN — CYANOCOBALAMIN TAB 1000 MCG 1000 MCG: 1000 TAB at 08:45

## 2023-09-17 RX ADMIN — CARVEDILOL 6.25 MG: 6.25 TABLET, FILM COATED ORAL at 08:46

## 2023-09-17 RX ADMIN — AMLODIPINE BESYLATE 10 MG: 10 TABLET ORAL at 08:45

## 2023-09-17 RX ADMIN — RIFAXIMIN 550 MG: 550 TABLET ORAL at 21:32

## 2023-09-17 RX ADMIN — MAGNESIUM OXIDE TAB 400 MG (241.3 MG ELEMENTAL MG) 400 MG: 400 (241.3 MG) TAB at 21:32

## 2023-09-17 RX ADMIN — LACTULOSE 20 G: 20 SOLUTION ORAL at 08:47

## 2023-09-17 RX ADMIN — Medication 125 MCG: at 08:44

## 2023-09-17 RX ADMIN — CARVEDILOL 6.25 MG: 6.25 TABLET, FILM COATED ORAL at 18:20

## 2023-09-17 RX ADMIN — SODIUM BICARBONATE 650 MG TABLET 1300 MG: at 08:46

## 2023-09-17 RX ADMIN — RIFAXIMIN 550 MG: 550 TABLET ORAL at 08:47

## 2023-09-17 RX ADMIN — LATANOPROST 1 DROP: 50 SOLUTION OPHTHALMIC at 21:34

## 2023-09-17 RX ADMIN — PREGABALIN 25 MG: 25 CAPSULE ORAL at 21:32

## 2023-09-17 RX ADMIN — MEROPENEM 500 MG: 500 INJECTION, POWDER, FOR SOLUTION INTRAVENOUS at 16:11

## 2023-09-17 RX ADMIN — MEROPENEM 500 MG: 500 INJECTION, POWDER, FOR SOLUTION INTRAVENOUS at 02:56

## 2023-09-17 RX ADMIN — PREGABALIN 25 MG: 25 CAPSULE ORAL at 08:45

## 2023-09-17 RX ADMIN — ASPIRIN 81 MG CHEWABLE TABLET 81 MG: 81 TABLET CHEWABLE at 08:45

## 2023-09-17 RX ADMIN — SODIUM BICARBONATE 650 MG TABLET 1300 MG: at 21:31

## 2023-09-17 RX ADMIN — OXYCODONE HYDROCHLORIDE 5 MG: 5 TABLET ORAL at 22:47

## 2023-09-17 RX ADMIN — LACTULOSE 20 G: 20 SOLUTION ORAL at 13:42

## 2023-09-17 ASSESSMENT — ACTIVITIES OF DAILY LIVING (ADL)
ADLS_ACUITY_SCORE: 42
ADLS_ACUITY_SCORE: 45
ADLS_ACUITY_SCORE: 42
ADLS_ACUITY_SCORE: 47
DEPENDENT_IADLS:: TRANSPORTATION;MEDICATION MANAGEMENT
ADLS_ACUITY_SCORE: 42
ADLS_ACUITY_SCORE: 42
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
## 2023-09-17 NOTE — PLAN OF CARE
Goal Outcome Evaluation:      Plan of Care Reviewed With: patient      Temp: 97.7  F (36.5  C) Temp src: Oral BP: 134/58 Pulse: 61   Resp: 16 SpO2: 97 % O2 Device: None (Room air)        VSS on RA. A&O x4. Assist of 1, GB, walker. Vargas catheter with leg bag in place. Vargas cares completed. On contact precautions. PIV saline locked. Pt. Had small, formed BM. Bg 107. On Meropenem for UTI. Plan to continue IV antibiotics.

## 2023-09-17 NOTE — CONSULTS
Care Management Initial Consult    General Information  Assessment completed with: Patient,    Type of CM/SW Visit: Initial Assessment    Primary Care Provider verified and updated as needed: Yes   Readmission within the last 30 days: no previous admission in last 30 days      Reason for Consult: care coordination/care conference, discharge planning    Communication Assessment  Patient's communication style: spoken language (English or Bilingual)         Cognitive  Cognitive/Neuro/Behavioral: WDL                      Living Environment:   People in home: alone     Current living Arrangements: apartment      Able to return to prior arrangements: yes       Family/Social Support:  Care provided by: homecare agency, self  Provides care for:    Marital Status: Single  PCA          Description of Support System: Supportive, Involved    Support Assessment: Adequate family and caregiver support    Current Resources:   Patient receiving home care services: Yes  Skilled Home Care Services: Skilled Nursing, Home Health Aid  Community Resources: PCA, Home Care, DME      Lifestyle & Psychosocial Needs:  Social Determinants of Health     Tobacco Use: Medium Risk (7/11/2021)    Patient History     Smoking Tobacco Use: Former     Smokeless Tobacco Use: Never     Passive Exposure: Not on file   Alcohol Use: Not on file   Financial Resource Strain: Not on file   Food Insecurity: Not on file   Transportation Needs: Not on file   Physical Activity: Not on file   Stress: Not on file   Social Connections: Not on file   Intimate Partner Violence: Not on file   Depression: At risk (1/9/2019)    PHQ-2     PHQ-2 Score: 4   Housing Stability: Not on file       Functional Status:  Prior to admission patient needed assistance:   Dependent ADLs:: Ambulation-walker  Dependent IADLs:: Transportation, Medication Management                Additional Information:  CM consulted for discharge planning. Patient was admitted with UTI sepsis with URR 11%.  Met with patient at bedside to discuss home services and plan of care. Patient verified that he lives alone in and apartment in [facility]. He has a home PCA that comes everyday for 7hrs. He has homemaking for 6 hrs a week. He gets homecare services from [facility] for RN/HHA. Call placed to [facility] and verified patient services. His home RN sets up his meds. His PCA helps with transportation. He does some of his own cooking, cleaning, laundry and bathing. He ambulates with a walker and has a scooter to use as needed. He uses [employer] if his PCA is unable to transport him to appts. He follows up with his MD regularly. He has follow up appts already scheduled as below.     Nephrology- Toan Perez 9/28 at 1020  [facility]- Elaine Bee 20/4 at 1320  GI- 10/18 at 1100    Patient does not anticipate any discharge needs in addition to what he already receives. His PCA will transport him home on day of discharge. He will need orders for HC RN/HHA. Will cont to follow for discharge plan of care.             Sofiya Covington RN BSN CM  Inpatient Care Coordination  [facility]  [phone]

## 2023-09-17 NOTE — PROGRESS NOTES
Notified provider about indwelling starkey catheter discussed removal or continued need.    Did provider choose to remove indwelling starkey catheter? NO    Provider's starkey indication for keeping indwelling starkey catheter: Indication for continued use: Neurogenic Bladder    Is there an order for indwelling starkey catheter? YES    *If there is a plan to keep starkey catheter in place at discharge daily notification with provider is not necessary, but please add a notation in the treatment team sticky note that the patient will be discharging with the catheter.

## 2023-09-17 NOTE — PLAN OF CARE
Goal Outcome Evaluation:      Plan of Care Reviewed With: patient    Overall Patient Progress: improving    : urine start of the shift burgundy color to pink now yellow w/a little pink. U.O. 550 / 600ml intake. IVAB ESBL. Leg bag patent. Refused large starkey bag.     GI: No BM from .Lactulose - pt refusing full dose. Only taking 20g per dosage. PO intake good. Glucoses 98 & 167. Not covered at noon meal due to no insulin pen available from pharmacy. Pen has been requested.      Buttock pressure wound - pre-existing but yellow w/scab. WOC consult requested.     Expected Discharge Date: Anticipating another day or 2 in the hospital pending sensitivity panel follow-up urine cultures

## 2023-09-18 LAB
ANION GAP SERPL CALCULATED.3IONS-SCNC: 5 MMOL/L (ref 7–15)
BACTERIA UR CULT: ABNORMAL
BASOPHILS # BLD AUTO: 0 10E3/UL (ref 0–0.2)
BASOPHILS NFR BLD AUTO: 1 %
BUN SERPL-MCNC: 38.1 MG/DL (ref 8–23)
CALCIUM SERPL-MCNC: 8.8 MG/DL (ref 8.8–10.2)
CHLORIDE SERPL-SCNC: 114 MMOL/L (ref 98–107)
CREAT SERPL-MCNC: 3.39 MG/DL (ref 0.67–1.17)
DEPRECATED HCO3 PLAS-SCNC: 20 MMOL/L (ref 22–29)
EGFRCR SERPLBLD CKD-EPI 2021: 18 ML/MIN/1.73M2
EOSINOPHIL # BLD AUTO: 0.2 10E3/UL (ref 0–0.7)
EOSINOPHIL NFR BLD AUTO: 3 %
ERYTHROCYTE [DISTWIDTH] IN BLOOD BY AUTOMATED COUNT: 14.3 % (ref 10–15)
GLUCOSE BLDC GLUCOMTR-MCNC: 103 MG/DL (ref 70–99)
GLUCOSE BLDC GLUCOMTR-MCNC: 127 MG/DL (ref 70–99)
GLUCOSE BLDC GLUCOMTR-MCNC: 131 MG/DL (ref 70–99)
GLUCOSE BLDC GLUCOMTR-MCNC: 137 MG/DL (ref 70–99)
GLUCOSE BLDC GLUCOMTR-MCNC: 140 MG/DL (ref 70–99)
GLUCOSE BLDC GLUCOMTR-MCNC: 162 MG/DL (ref 70–99)
GLUCOSE SERPL-MCNC: 108 MG/DL (ref 70–99)
HCT VFR BLD AUTO: 24 % (ref 40–53)
HGB BLD-MCNC: 8.2 G/DL (ref 13.3–17.7)
IMM GRANULOCYTES # BLD: 0 10E3/UL
IMM GRANULOCYTES NFR BLD: 0 %
LYMPHOCYTES # BLD AUTO: 2.1 10E3/UL (ref 0.8–5.3)
LYMPHOCYTES NFR BLD AUTO: 39 %
MCH RBC QN AUTO: 29.7 PG (ref 26.5–33)
MCHC RBC AUTO-ENTMCNC: 34.2 G/DL (ref 31.5–36.5)
MCV RBC AUTO: 87 FL (ref 78–100)
MONOCYTES # BLD AUTO: 0.7 10E3/UL (ref 0–1.3)
MONOCYTES NFR BLD AUTO: 13 %
NEUTROPHILS # BLD AUTO: 2.4 10E3/UL (ref 1.6–8.3)
NEUTROPHILS NFR BLD AUTO: 44 %
NRBC # BLD AUTO: 0 10E3/UL
NRBC BLD AUTO-RTO: 0 /100
PLATELET # BLD AUTO: 87 10E3/UL (ref 150–450)
POTASSIUM SERPL-SCNC: 5.1 MMOL/L (ref 3.4–5.3)
RBC # BLD AUTO: 2.76 10E6/UL (ref 4.4–5.9)
SODIUM SERPL-SCNC: 139 MMOL/L (ref 136–145)
WBC # BLD AUTO: 5.4 10E3/UL (ref 4–11)

## 2023-09-18 PROCEDURE — 80048 BASIC METABOLIC PNL TOTAL CA: CPT | Performed by: INTERNAL MEDICINE

## 2023-09-18 PROCEDURE — G0463 HOSPITAL OUTPT CLINIC VISIT: HCPCS

## 2023-09-18 PROCEDURE — 250N000011 HC RX IP 250 OP 636: Performed by: EMERGENCY MEDICINE

## 2023-09-18 PROCEDURE — 36415 COLL VENOUS BLD VENIPUNCTURE: CPT | Performed by: INTERNAL MEDICINE

## 2023-09-18 PROCEDURE — 250N000011 HC RX IP 250 OP 636: Mod: JZ | Performed by: INTERNAL MEDICINE

## 2023-09-18 PROCEDURE — 250N000013 HC RX MED GY IP 250 OP 250 PS 637: Performed by: INTERNAL MEDICINE

## 2023-09-18 PROCEDURE — 258N000003 HC RX IP 258 OP 636: Performed by: INTERNAL MEDICINE

## 2023-09-18 PROCEDURE — 85025 COMPLETE CBC W/AUTO DIFF WBC: CPT | Performed by: INTERNAL MEDICINE

## 2023-09-18 PROCEDURE — 99222 1ST HOSP IP/OBS MODERATE 55: CPT | Performed by: INTERNAL MEDICINE

## 2023-09-18 PROCEDURE — 120N000001 HC R&B MED SURG/OB

## 2023-09-18 PROCEDURE — 99232 SBSQ HOSP IP/OBS MODERATE 35: CPT | Performed by: INTERNAL MEDICINE

## 2023-09-18 RX ORDER — CARVEDILOL 3.12 MG/1
3.12 TABLET ORAL 2 TIMES DAILY WITH MEALS
Status: DISCONTINUED | OUTPATIENT
Start: 2023-09-18 | End: 2023-09-19 | Stop reason: HOSPADM

## 2023-09-18 RX ADMIN — CYANOCOBALAMIN TAB 1000 MCG 1000 MCG: 1000 TAB at 09:43

## 2023-09-18 RX ADMIN — SODIUM BICARBONATE 650 MG TABLET 1300 MG: at 21:54

## 2023-09-18 RX ADMIN — RIFAXIMIN 550 MG: 550 TABLET ORAL at 21:53

## 2023-09-18 RX ADMIN — PREGABALIN 25 MG: 25 CAPSULE ORAL at 09:44

## 2023-09-18 RX ADMIN — MAGNESIUM OXIDE TAB 400 MG (241.3 MG ELEMENTAL MG) 400 MG: 400 (241.3 MG) TAB at 21:53

## 2023-09-18 RX ADMIN — AMLODIPINE BESYLATE 10 MG: 10 TABLET ORAL at 09:44

## 2023-09-18 RX ADMIN — THIAMINE HCL TAB 100 MG 100 MG: 100 TAB at 09:43

## 2023-09-18 RX ADMIN — RIFAXIMIN 550 MG: 550 TABLET ORAL at 09:44

## 2023-09-18 RX ADMIN — PREGABALIN 25 MG: 25 CAPSULE ORAL at 21:54

## 2023-09-18 RX ADMIN — Medication 125 MCG: at 09:43

## 2023-09-18 RX ADMIN — FOLIC ACID 1 MG: 1 TABLET ORAL at 09:42

## 2023-09-18 RX ADMIN — LACTULOSE 30 G: 20 SOLUTION ORAL at 09:40

## 2023-09-18 RX ADMIN — SODIUM BICARBONATE 650 MG TABLET 1300 MG: at 09:42

## 2023-09-18 RX ADMIN — MEROPENEM 500 MG: 500 INJECTION, POWDER, FOR SOLUTION INTRAVENOUS at 03:44

## 2023-09-18 RX ADMIN — LACTULOSE 30 G: 20 SOLUTION ORAL at 14:48

## 2023-09-18 RX ADMIN — LATANOPROST 1 DROP: 50 SOLUTION OPHTHALMIC at 21:58

## 2023-09-18 RX ADMIN — ERTAPENEM SODIUM 500 MG: 1 INJECTION, POWDER, LYOPHILIZED, FOR SOLUTION INTRAMUSCULAR; INTRAVENOUS at 18:04

## 2023-09-18 RX ADMIN — ASPIRIN 81 MG CHEWABLE TABLET 81 MG: 81 TABLET CHEWABLE at 09:42

## 2023-09-18 ASSESSMENT — ACTIVITIES OF DAILY LIVING (ADL)
ADLS_ACUITY_SCORE: 42
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
## 2023-09-18 NOTE — PLAN OF CARE
Pt is alert and oriented able to make needs known. Pt rated pain 3/10. Pt is on contact isolation for ESBL.Pt has chronic Vargas cathter which is intact and patent. Pt is on Meropenem for UTI. Pt is on blood sugar checks. Pt has Mepilex on coccyx. Pt is sleeping in bed. Bed alarm in place and call light within reach. Will continue to monitor and assess pt.

## 2023-09-18 NOTE — PROGRESS NOTES
Care Management Follow Up    Length of Stay (days): 2    Expected Discharge Date: 09/20/2023     Concerns to be Addressed: all concerns addressed in this encounter     Patient plan of care discussed at interdisciplinary rounds: Yes    Anticipated Discharge Disposition: Home, Home Care     Anticipated Discharge Services: PCA, Transportation Services  Anticipated Discharge DME: None    Patient/family educated on Medicare website which has current facility and service quality ratings: no    Patient/Family in Agreement with the Plan: yes        Additional Information:  CM following for discharge plan of care. Call received from patient's PCA Jeny inquiring on patient's discharge plan of care. Verbal permission received from patient to speak to Jeny and add her to his Contact list. Facesheet was updated. She is a good support system for patient. She verified that she works for [employer] for her PCA services. She states patient currently gets 6 hrs/week of homemaking but he has recently gotten services for 7 hrs/day of PCA care that she is working on finalizing. She states she sees patient on a daily basis and is able to transport him. She will be able to transport on day of discharge after 4pm. Updated her that patient is still receiving antibiotics and that ID will also see for recommendations. Anticipate discharge in a couple of days.               Sofiya Covington RN BSN CM  Inpatient Care Coordination  [facility]  [phone]

## 2023-09-18 NOTE — PLAN OF CARE
Neuro: A&Ox3, disoriented to full situation at times and forgetful-see flowsheet.   Cardiac: Bradycardia 50-60. VSS.   Respiratory: Sating >90% on RA.  GI/: Adequate urine output. No BM  Diet/appetite: Tolerating Renal/Mod Carb diet. Eating well.  Activity:  Assist of 1 w/GB and walker, up to chair and in room.  Pain: At acceptable level on current regimen.   Skin: No new deficits noted.-see flowsheet.   LDA's:Vargas. R PIV.    Plan: ID consult placed-see note. WOCRN saw pt, see note/orders. Educated on need for lactulose and encouraged not to refuse. Encouraged PO intake and out of bed. Updated care plan.

## 2023-09-18 NOTE — PROGRESS NOTES
Temp: 98.7  F (37.1  C) Temp src: Oral BP: 120/52 Pulse: 61   Resp: 16 SpO2: 100 % O2 Device: None (Room air)       VSS on RA. Cared for patient from 2300 to 0245. Gave report to next nurse at 0245.

## 2023-09-18 NOTE — PROVIDER NOTIFICATION
MD paged. Pt wants to get something for leg pain, 10/10.  There is no prn meds. Please advise. Thank you.   MD placed order.
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
## 2023-09-19 ENCOUNTER — HOME INFUSION (PRE-WILLOW HOME INFUSION) (OUTPATIENT)
Dept: PHARMACY | Facility: CLINIC | Age: 73
End: 2023-09-19
Payer: COMMERCIAL

## 2023-09-19 VITALS
HEART RATE: 58 BPM | RESPIRATION RATE: 19 BRPM | TEMPERATURE: 98.3 F | OXYGEN SATURATION: 100 % | HEIGHT: 68 IN | WEIGHT: 166.01 LBS | BODY MASS INDEX: 25.16 KG/M2 | DIASTOLIC BLOOD PRESSURE: 50 MMHG | SYSTOLIC BLOOD PRESSURE: 122 MMHG

## 2023-09-19 LAB
GLUCOSE BLDC GLUCOMTR-MCNC: 112 MG/DL (ref 70–99)
GLUCOSE BLDC GLUCOMTR-MCNC: 126 MG/DL (ref 70–99)
GLUCOSE BLDC GLUCOMTR-MCNC: 155 MG/DL (ref 70–99)

## 2023-09-19 PROCEDURE — 250N000013 HC RX MED GY IP 250 OP 250 PS 637: Performed by: INTERNAL MEDICINE

## 2023-09-19 PROCEDURE — 36569 INSJ PICC 5 YR+ W/O IMAGING: CPT

## 2023-09-19 PROCEDURE — 272N000748 HC KIT, CATH 3FR OR 4FR SINGLE LUMEN POWERMIDLINE

## 2023-09-19 PROCEDURE — 250N000012 HC RX MED GY IP 250 OP 636 PS 637: Performed by: INTERNAL MEDICINE

## 2023-09-19 PROCEDURE — 99239 HOSP IP/OBS DSCHRG MGMT >30: CPT | Performed by: INTERNAL MEDICINE

## 2023-09-19 PROCEDURE — 258N000003 HC RX IP 258 OP 636: Performed by: INTERNAL MEDICINE

## 2023-09-19 PROCEDURE — 250N000011 HC RX IP 250 OP 636: Mod: JZ | Performed by: INTERNAL MEDICINE

## 2023-09-19 RX ORDER — ALBUTEROL SULFATE 90 UG/1
1-2 AEROSOL, METERED RESPIRATORY (INHALATION)
Status: CANCELLED
Start: 2023-09-20

## 2023-09-19 RX ORDER — EPINEPHRINE 1 MG/ML
0.3 INJECTION, SOLUTION, CONCENTRATE INTRAVENOUS EVERY 5 MIN PRN
Status: CANCELLED | OUTPATIENT
Start: 2023-09-20

## 2023-09-19 RX ORDER — LIDOCAINE 40 MG/G
CREAM TOPICAL
Status: DISCONTINUED | OUTPATIENT
Start: 2023-09-19 | End: 2023-09-19 | Stop reason: HOSPADM

## 2023-09-19 RX ORDER — ALBUTEROL SULFATE 0.83 MG/ML
2.5 SOLUTION RESPIRATORY (INHALATION)
Status: CANCELLED | OUTPATIENT
Start: 2023-09-20

## 2023-09-19 RX ORDER — HEPARIN SODIUM (PORCINE) LOCK FLUSH IV SOLN 100 UNIT/ML 100 UNIT/ML
5 SOLUTION INTRAVENOUS
Status: CANCELLED | OUTPATIENT
Start: 2023-09-20

## 2023-09-19 RX ORDER — MEPERIDINE HYDROCHLORIDE 25 MG/ML
25 INJECTION INTRAMUSCULAR; INTRAVENOUS; SUBCUTANEOUS EVERY 30 MIN PRN
Status: CANCELLED | OUTPATIENT
Start: 2023-09-20

## 2023-09-19 RX ORDER — DIPHENHYDRAMINE HYDROCHLORIDE 50 MG/ML
50 INJECTION INTRAMUSCULAR; INTRAVENOUS
Status: CANCELLED
Start: 2023-09-20

## 2023-09-19 RX ORDER — HEPARIN SODIUM,PORCINE 10 UNIT/ML
5-20 VIAL (ML) INTRAVENOUS DAILY PRN
Status: CANCELLED | OUTPATIENT
Start: 2023-09-20

## 2023-09-19 RX ORDER — METHYLPREDNISOLONE SODIUM SUCCINATE 125 MG/2ML
125 INJECTION, POWDER, LYOPHILIZED, FOR SOLUTION INTRAMUSCULAR; INTRAVENOUS
Status: CANCELLED
Start: 2023-09-20

## 2023-09-19 RX ADMIN — Medication 125 MCG: at 08:55

## 2023-09-19 RX ADMIN — ERTAPENEM SODIUM 500 MG: 1 INJECTION, POWDER, LYOPHILIZED, FOR SOLUTION INTRAMUSCULAR; INTRAVENOUS at 14:30

## 2023-09-19 RX ADMIN — LACTULOSE 30 G: 20 SOLUTION ORAL at 08:53

## 2023-09-19 RX ADMIN — SODIUM BICARBONATE 650 MG TABLET 1300 MG: at 08:55

## 2023-09-19 RX ADMIN — CYANOCOBALAMIN TAB 1000 MCG 1000 MCG: 1000 TAB at 08:54

## 2023-09-19 RX ADMIN — FOLIC ACID 1 MG: 1 TABLET ORAL at 08:55

## 2023-09-19 RX ADMIN — RIFAXIMIN 550 MG: 550 TABLET ORAL at 08:55

## 2023-09-19 RX ADMIN — ASPIRIN 81 MG CHEWABLE TABLET 81 MG: 81 TABLET CHEWABLE at 08:55

## 2023-09-19 RX ADMIN — THIAMINE HCL TAB 100 MG 100 MG: 100 TAB at 08:55

## 2023-09-19 RX ADMIN — AMLODIPINE BESYLATE 10 MG: 10 TABLET ORAL at 09:04

## 2023-09-19 RX ADMIN — INSULIN ASPART 1 UNITS: 100 INJECTION, SOLUTION INTRAVENOUS; SUBCUTANEOUS at 13:07

## 2023-09-19 RX ADMIN — PREGABALIN 25 MG: 25 CAPSULE ORAL at 08:55

## 2023-09-19 RX ADMIN — LACTULOSE 30 G: 20 SOLUTION ORAL at 14:26

## 2023-09-19 ASSESSMENT — ACTIVITIES OF DAILY LIVING (ADL)
ADLS_ACUITY_SCORE: 42
ADLS_ACUITY_SCORE: 42
ADLS_ACUITY_SCORE: 39
ADLS_ACUITY_SCORE: 42
ADLS_ACUITY_SCORE: 42
ADLS_ACUITY_SCORE: 39
ADLS_ACUITY_SCORE: 42

## 2023-09-19 NOTE — PROGRESS NOTES
Care Management Discharge Note    Discharge Date: 09/19/2023       Discharge Disposition: Home, Home Care    Discharge Services: PCA, Transportation Services, Home infusion, HC    Discharge DME: None    Discharge Transportation: family or friend will provide, PCA [facility] [phone]    Patient/Family in Agreement with the Plan: yes    Handoff Referral Completed: yes    Additional Information:  CM following for discharge plan of care. Patient has orders to discharge home today with resumption of Home Care services and Home IV antibiotic needs for 7 days. He will need a Midline IV placed prior to discharge today. Met with patient at bedside to discuss discharge options of home with home IV infusions with PCA assistance vs OP Infusion. Patient would prefer to have home infusions if possible. Benefit check sent to [facility] for coverage/cost estimate. Per chart review, patient will be discharging home with needs for home (IV antibiotics). Current plan of care indicates patient will need IV ertapenam for 7 more days. [facility] Liaison will contact patient to introduce home infusion, review benefits and offer choice. Patient is agreeable to [facility] upon discussion. Per [facility] benefit check:  Pt has 100% coverage for IV abx with [employer]/ [employer] plan.    Call placed on speaker phone with patient to [facility] [phone], patient's PCA, to discuss discharge plan and patient need for home infusion vs going daily to an OP Infusion center. Discussed that HI Liaison will teach patient how to administer his home IV antibiotics and if she would be available each day for the next 7 days to assist patient with this if needed. Recommend that patient and PCA are available together for teach. Patient is dosing his antibiotics in the evenings. Jeny states she would prefer that patient is able to return home with HC and Home infusion. She is able to be available in the evenings to assist patient in administering his antibiotics. She is asking for  patient to have Physical Therapy/Occupational Therapy added on to his Home care orders for additional help and strengthening at home.     Awaiting call back from [facility] Liaison regarding discharge later today. [facility] is available at anytime today for teach and transport home for patient.     Discharge orders sent via [facility] to [facility] to continue following for HC needs. They will reach out to patient to begin services.     RN CC will cont to follow for discharge needs and work with HI Liaison for Home Infusion arrangements. Patient will need discharge order for Home Infusion Referral placed by MD on discharge.      ADDENDUM 1155:  Call received from [facility] Liaison regarding discharge plan. She states patient's PCA is not able to administer the medication to patient. Spoke to [facility] about this and she states she would be willing to take patient to OP infusion but is asking to have CM speak to patient about his neighbor Ara helping him with his home infusions. Patient is currently getting a midline placed. Will follow up with patient regarding home vs OP infusions.     ADDENDUM 1420:  Spoke to patient at bedside about neighbor possibly doing his infusions for him and he did not want to ask that of his neighbor. We discussed only other option of going to OP Infusion centers daily. He is agreeable to going to OP Infusion. Call placed to various OP Infusion centers through [facility] and was able to schedule patient for OP infusions for the next week between 3 different facilities. Infusion appointments are on the AVS with times and facility addresses. Call placed to [facility] and updated her on plan of care with OP infusions at various locations due to capacity status. She will be transporting patient home today after his daily antibiotic. She will come to hospital around 1630 today. Bedside RN and charge RN updated.         Sofiya Covington RN BSN CM  Inpatient Care Coordination  [facility]  [place] [phone]

## 2023-09-19 NOTE — DISCHARGE SUMMARY
Patient discharged home via private car, transported via wheelchair with support staff.  Patient verbalized and received copy of discharge instructions.  Personal belongings gathered and sent with patient, including home meds.  VSS. Midline and starkey remain in place  for use after discharge.
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
## 2023-09-19 NOTE — PROCEDURES
[facility]    Single Lumen Midline Placement    Date/Time: 9/19/2023 11:45 AM    Performed by: Charo Samayoa RN  Authorized by: Navdeep Pelayo MD  Indications: vascular access      UNIVERSAL PROTOCOL   Site Marked: Yes  Prior Images Obtained and Reviewed:  NA  Required items: Required blood products, implants, devices and special equipment available    Patient identity confirmed:  Verbally with patient, arm band, provided demographic data and hospital-assigned identification number  NA - No sedation, light sedation, or local anesthesia  Confirmation Checklist:  Patient's identity using two indicators, relevant allergies, procedure was appropriate and matched the consent or emergent situation and correct equipment/implants were available  Time out: Immediately prior to the procedure a time out was called    Universal Protocol: the Joint Commission Universal Protocol was followed    Preparation: Patient was prepped and draped in usual sterile fashion       ANESTHESIA    Anesthesia:  Local infiltration  Local Anesthetic:  Lidocaine 1% without epinephrine  Anesthetic Total (mL):  3      SEDATION    Patient Sedated: No        Preparation: skin prepped with ChloraPrep  Skin prep agent: skin prep agent completely dried prior to procedure  Sterile barriers: maximum sterile barriers were used: cap, mask, sterile gown, sterile gloves, and large sterile sheet  Hand hygiene: hand hygiene performed prior to central venous catheter insertion  Type of line used: Midline  Catheter type: single lumen  Lumen type: non-valved  Catheter size: 4 Fr  Brand: Bard  Lot number: [id number]  Placement method: venipuncture, MST and ultrasound  Number of attempts: 1  Difficulty threading catheter: no  Successful placement: yes  Orientation: left  Catheter to Vein (%): 17  Location: brachial vein (medial)  Tip Location: distal to axillary vein  Arm circumference: adults 10 cm  Extremity circumference: 28  Visible  catheter length: 2  Internal length: 13 cm  Total catheter length: 15  Dressing and securement: additional securement method (see comment), alcohol impregnated caps, blood cleaned with CHG, chlorhexidine disc applied, dressing applied, securement device, site cleansed, sterile dressing applied, subcutaneous anchor securement system and transparent dressing  Post procedure assessment: blood return through all ports and free fluid flow  PROCEDURE   Patient Tolerance:  Patient tolerated the procedure well with no immediate complicationsDescribe Procedure: LUE midline inserted into brachial vein without difficulty. Midline tip is distal to axilla. Midline is OK to use. Bedside RN Igor updated. See flowsheet for additional information.

## 2023-09-19 NOTE — PLAN OF CARE
"  Problem: Plan of Care - These are the overarching goals to be used throughout the patient stay.    Goal: Plan of Care Review  Description: The Plan of Care Review/Shift note should be completed every shift.  The Outcome Evaluation is a brief statement about your assessment that the patient is improving, declining, or no change.  This information will be displayed automatically on your shift note.  Outcome: Progressing  Goal: Patient-Specific Goal (Individualized)  Description: You can add care plan individualizations to a care plan. Examples of Individualization might be:  \"Parent requests to be called daily at 9am for status\", \"I have a hard time hearing out of my right ear\", or \"Do not touch me to wake me up as it startles me\".  Outcome: Progressing  Goal: Absence of Hospital-Acquired Illness or Injury  Outcome: Progressing  Intervention: Identify and Manage Fall Risk  Recent Flowsheet Documentation  Taken 9/19/2023 1314 by Geovanna Kendrick RN  Safety Promotion/Fall Prevention:   assistive device/personal items within reach   clutter free environment maintained   safety round/check completed   patient and family education  Taken 9/19/2023 0917 by Geovanna Kendrick RN  Safety Promotion/Fall Prevention:   assistive device/personal items within reach   clutter free environment maintained   increased rounding and observation   increase visualization of patient   lighting adjusted   nonskid shoes/slippers when out of bed   supervised activity   toileting scheduled  Intervention: Prevent Skin Injury  Recent Flowsheet Documentation  Taken 9/19/2023 1314 by Geovanna Kendrick, RN  Body Position: (Refused up in chair x4)   position changed independently   supine   supine, legs elevated   side-lying   left   weight shifting  Taken 9/19/2023 0917 by Geovanna Kendrick RN  Body Position:   position changed independently   weight shifting   heels elevated   neutral body alignment  Intervention: Prevent and Manage VTE (Venous " "Thromboembolism) Risk  Recent Flowsheet Documentation  Taken 9/19/2023 0917 by Geovanna Kendrick, RN  VTE Prevention/Management: SCDs (sequential compression devices) off  Goal: Optimal Comfort and Wellbeing  Outcome: Progressing  Goal: Readiness for Transition of Care  Outcome: Progressing  Intervention: Mutually Develop Transition Plan  Recent Flowsheet Documentation  Taken 9/19/2023 1314 by Geovanna Kendrick, RN  Equipment Currently Used at Home:   shower chair   walker, standard   grab bar, tub/shower   grab bar, toilet     Problem: Diabetes Comorbidity  Goal: Blood Glucose Level Within Targeted Range  Outcome: Progressing     Problem: Infection  Goal: Absence of Infection Signs and Symptoms  Outcome: Progressing   Goal Outcome Evaluation:  Vital Signs stable HR 54-58. Ox4, sleeps between cares. No complaints of pain. Hgb & Plts Low, per MD this is a chronic issue. BP stable, denies light headedness & dizziness. Pressure Injury on buttock clean, dry, intact, foam dressing in place. Patient refused ambulation and up in chair during shift. INVanz abx given early d/t discharge at 1630. Pt to discharge with PCA \"Jeny\". Meds retrieved from inpatient pharmacy, placed with pt belongings for discharge. Pt discharge w/ Single lumen midline placed by vascular RN today.                        "

## 2023-09-19 NOTE — PROGRESS NOTES
Notified provider about indwelling starkey catheter discussed removal or continued need.    Did provider choose to remove indwelling starkey catheter? YES    Provider's starkey indication for keeping indwelling starkey catheter: Indication for continued use: Neurogenic Bladder    Is there an order for indwelling starkey catheter? YES    *If there is a plan to keep starkey catheter in place at discharge daily notification with provider is not necessary, but please add a notation in the treatment team sticky note that the patient will be discharging with the catheter.

## 2023-09-19 NOTE — PROGRESS NOTES
"[facility]    Received request for benefit check should pt require home IV abx. Pt has 100% coverage through his [employer] plan.     My office spoke with pt's home care agency ([facility]) to see if they would be able to manage weekly line care and labs. [facility] said they would be able to manage as long as the pt or a family member was able to do the infusions as PCAs are not allowed to infusions. I sent me the following message, \"PCA's are not allowed to administer IV's, [place] STATUTE 256B.0659 Subd.2. (2) assistance with self-administered medication as defined by this section, including reminders to take medication, bringing medication to the recipient, and assistance with opening medication under the direction of the recipient or responsible party, including medications given through a nebulizer; Subd. 3. (c) Other specific tasks not covered under paragraph (a) or (b) that are not eligible for medical assistance reimbursement for personal care assistance services under this section include:  (1) sterile procedures;  (2) injections of fluids and medications into veins, muscles, or skin\"     I talked to the pt's PCA, Jeny who reported she is not a family member and the pt would not be able to manage his infusions independently due to poor dexterity. Jeny said that she would be wiling to bring the pt into an infusion center daily for IV abx. Phone call placed to pt's care coordinator, Sofiya to update. She said she will begin looking into infusion center options.     Thank you     Bridgett Holland RN  [facility] Liaison  [phone] (Mon thru Fri 8am - 5pm)  [phone] Office    "

## 2023-09-19 NOTE — PROGRESS NOTES
Therapy: IV abx  Insurance: [employer]    100% coverage     In reference to admission on 9/15/23 to check IV abx coverage    Please contact Intake with any questions, [phone] or In Basket pool, [facility] ([phone]).

## 2023-09-19 NOTE — PLAN OF CARE
Goal Outcome Evaluation:      Plan of Care Reviewed With: patient    Overall Patient Progress: improvingOverall Patient Progress: improving     VS: VSS on RA.  Pain: Denies pain.   Lines: PIV running- TKO.   Cognitive / Neruo: Aox4. Neuro- forgetful.   Respiratory: LS: Diminished. Denies SOB.  Cardiac: Denies CP. Tele: none.   Peripheral Neurovascular: No edema.  Numbness, tingling present at baseline due to neuropathy. Denies tenderness. Pulses 2+.   GI: Last BM: _9/17__. Denies N/V.   : Vargas leg bag.   Skin: wound on coccyx. See flowsheet for skin assessment.   Diet: Renal/mod consistent.   Activity: 1 assist with gait belt and walker.   Labs: BS- 126  Plan: Continue w/ POC.      Pt refused lactulose. Educated on the importance of it Pt is lethargic and sleeping in between cares. Contact precautions maintained.

## 2023-09-20 ENCOUNTER — INFUSION THERAPY VISIT (OUTPATIENT)
Dept: INFUSION THERAPY | Facility: CLINIC | Age: 73
End: 2023-09-20
Attending: INTERNAL MEDICINE
Payer: COMMERCIAL

## 2023-09-20 VITALS
RESPIRATION RATE: 16 BRPM | HEART RATE: 62 BPM | OXYGEN SATURATION: 98 % | DIASTOLIC BLOOD PRESSURE: 69 MMHG | SYSTOLIC BLOOD PRESSURE: 173 MMHG

## 2023-09-20 DIAGNOSIS — N39.0 URINARY TRACT INFECTION ASSOCIATED WITH INDWELLING URETHRAL CATHETER, INITIAL ENCOUNTER (H): Primary | ICD-10-CM

## 2023-09-20 DIAGNOSIS — T83.511A URINARY TRACT INFECTION ASSOCIATED WITH INDWELLING URETHRAL CATHETER, INITIAL ENCOUNTER (H): Primary | ICD-10-CM

## 2023-09-20 PROCEDURE — 250N000011 HC RX IP 250 OP 636: Mod: JW | Performed by: INTERNAL MEDICINE

## 2023-09-20 PROCEDURE — 250N000009 HC RX 250: Performed by: INTERNAL MEDICINE

## 2023-09-20 PROCEDURE — 96374 THER/PROPH/DIAG INJ IV PUSH: CPT

## 2023-09-20 RX ORDER — HEPARIN SODIUM,PORCINE 10 UNIT/ML
5-20 VIAL (ML) INTRAVENOUS DAILY PRN
Status: DISCONTINUED | OUTPATIENT
Start: 2023-09-20 | End: 2023-09-20 | Stop reason: HOSPADM

## 2023-09-20 RX ORDER — ALBUTEROL SULFATE 0.83 MG/ML
2.5 SOLUTION RESPIRATORY (INHALATION)
Status: CANCELLED | OUTPATIENT
Start: 2023-09-21

## 2023-09-20 RX ORDER — DIPHENHYDRAMINE HYDROCHLORIDE 50 MG/ML
50 INJECTION INTRAMUSCULAR; INTRAVENOUS
Status: CANCELLED
Start: 2023-09-21

## 2023-09-20 RX ORDER — ALBUTEROL SULFATE 90 UG/1
1-2 AEROSOL, METERED RESPIRATORY (INHALATION)
Status: CANCELLED
Start: 2023-09-21

## 2023-09-20 RX ORDER — EPINEPHRINE 1 MG/ML
0.3 INJECTION, SOLUTION INTRAMUSCULAR; SUBCUTANEOUS EVERY 5 MIN PRN
Status: CANCELLED | OUTPATIENT
Start: 2023-09-21

## 2023-09-20 RX ORDER — MEPERIDINE HYDROCHLORIDE 25 MG/ML
25 INJECTION INTRAMUSCULAR; INTRAVENOUS; SUBCUTANEOUS EVERY 30 MIN PRN
Status: CANCELLED | OUTPATIENT
Start: 2023-09-21

## 2023-09-20 RX ORDER — HEPARIN SODIUM (PORCINE) LOCK FLUSH IV SOLN 100 UNIT/ML 100 UNIT/ML
5 SOLUTION INTRAVENOUS
Status: CANCELLED | OUTPATIENT
Start: 2023-09-21

## 2023-09-20 RX ORDER — HEPARIN SODIUM,PORCINE 10 UNIT/ML
5-20 VIAL (ML) INTRAVENOUS DAILY PRN
Status: CANCELLED | OUTPATIENT
Start: 2023-09-21

## 2023-09-20 RX ORDER — METHYLPREDNISOLONE SODIUM SUCCINATE 125 MG/2ML
125 INJECTION, POWDER, LYOPHILIZED, FOR SOLUTION INTRAMUSCULAR; INTRAVENOUS
Status: CANCELLED
Start: 2023-09-21

## 2023-09-20 RX ADMIN — ERTAPENEM SODIUM 500 MG: 1 INJECTION, POWDER, LYOPHILIZED, FOR SOLUTION INTRAMUSCULAR; INTRAVENOUS at 14:15

## 2023-09-20 RX ADMIN — Medication 5 ML: at 15:32

## 2023-09-20 ASSESSMENT — PAIN SCALES - GENERAL: PAINLEVEL: NO PAIN (0)

## 2023-09-20 NOTE — PATIENT INSTRUCTIONS
Dear Javi Celaya    Thank you for choosing [facility] ([facility]) for your infusion.  The following information is a summary of our appointment as well as important reminders.      If you are a transplant patient and require transplant education, please click on this link: [email or web address].    We look forward in seeing you on your next appointment here at [facility] ([facility]).  Please don t hesitate to call us at [phone] to reschedule any of your appointments or to speak with one of the [facility] registered nurses.  It was a pleasure taking care of you today.    Sincerely,    [facility]  [facility]  [address] [place] [address]  [address]  Phone:  [phone]

## 2023-09-20 NOTE — PROGRESS NOTES
Infusion Nursing Note:  Javi Celaya presents today for IVANZ.    Patient seen by provider today: No   present during visit today: Not Applicable.    Note: Patient identification verified by name and date of birth.  Assessment completed.  Vitals recorded in Doc Flowsheets.  Patient was provided with education regarding medication/procedure and possible side effects.  Patient verbalized understanding.    Patient accompanied by his PCA. He is assist of 1 to stand and Pivot from wheel chair to Recliner. Patient is hard of hearing and encephalopathic.     During today's [facility] appointment, orders from Navdeep Pelayo MD  were completed.  Frequency: every day.   .  Administrations This Visit       ertapenem (INVanz) 500 mg in 5 mL SWFI for IVP       Admin Date  09/20/2023 Action  $Given Dose  500 mg Route  Intravenous Administered By  Joyce Dove RN              heparin lock flush 10 UNIT/ML injection 5-20 mL       Admin Date  09/20/2023 Action  $Given Dose  5 mL Route  Intracatheter Administered By  Joyce Dove RN                      Intravenous Access:  Midline. Left upper arm, Heparin locked     Treatment Conditions:  Not Applicable.      Post Infusion Assessment:  Patient tolerated infusion without incident.  Blood return noted pre and post infusion.  Site patent and intact, free from redness, edema or discomfort.       Discharge Plan:   Copy of AVS reviewed with patient and/or family.  Patient will return   Future Appointments   Date Time Provider Department Center   9/21/2023  2:00 PM  [facility]      for next appointment.  Patient discharged in stable condition accompanied by: self and PCA.  Departure Mode: Wheelchair.    BP (!) 173/69 (BP Location: Right arm, Patient Position: Sitting, Cuff Size: Adult Regular)   Pulse 62   Resp 16   SpO2 98%     Joyce Dove RN

## 2023-09-21 ENCOUNTER — INFUSION THERAPY VISIT (OUTPATIENT)
Dept: INFUSION THERAPY | Facility: CLINIC | Age: 73
End: 2023-09-21
Attending: INTERNAL MEDICINE
Payer: COMMERCIAL

## 2023-09-21 VITALS
RESPIRATION RATE: 16 BRPM | HEART RATE: 69 BPM | SYSTOLIC BLOOD PRESSURE: 137 MMHG | DIASTOLIC BLOOD PRESSURE: 68 MMHG | OXYGEN SATURATION: 99 % | TEMPERATURE: 97.9 F

## 2023-09-21 DIAGNOSIS — N39.0 URINARY TRACT INFECTION ASSOCIATED WITH INDWELLING URETHRAL CATHETER, INITIAL ENCOUNTER (H): Primary | ICD-10-CM

## 2023-09-21 DIAGNOSIS — T83.511A URINARY TRACT INFECTION ASSOCIATED WITH INDWELLING URETHRAL CATHETER, INITIAL ENCOUNTER (H): Primary | ICD-10-CM

## 2023-09-21 PROCEDURE — 250N000011 HC RX IP 250 OP 636: Mod: JZ | Performed by: INTERNAL MEDICINE

## 2023-09-21 PROCEDURE — 96374 THER/PROPH/DIAG INJ IV PUSH: CPT

## 2023-09-21 PROCEDURE — 250N000009 HC RX 250: Performed by: INTERNAL MEDICINE

## 2023-09-21 RX ORDER — EPINEPHRINE 1 MG/ML
0.3 INJECTION, SOLUTION INTRAMUSCULAR; SUBCUTANEOUS EVERY 5 MIN PRN
Status: CANCELLED | OUTPATIENT
Start: 2023-09-22

## 2023-09-21 RX ORDER — METHYLPREDNISOLONE SODIUM SUCCINATE 125 MG/2ML
125 INJECTION, POWDER, LYOPHILIZED, FOR SOLUTION INTRAMUSCULAR; INTRAVENOUS
Status: CANCELLED
Start: 2023-09-22

## 2023-09-21 RX ORDER — HEPARIN SODIUM,PORCINE 10 UNIT/ML
5-20 VIAL (ML) INTRAVENOUS DAILY PRN
Status: DISCONTINUED | OUTPATIENT
Start: 2023-09-21 | End: 2023-10-19 | Stop reason: HOSPADM

## 2023-09-21 RX ORDER — MEPERIDINE HYDROCHLORIDE 25 MG/ML
25 INJECTION INTRAMUSCULAR; INTRAVENOUS; SUBCUTANEOUS EVERY 30 MIN PRN
Status: CANCELLED | OUTPATIENT
Start: 2023-09-22

## 2023-09-21 RX ORDER — HEPARIN SODIUM,PORCINE 10 UNIT/ML
5-20 VIAL (ML) INTRAVENOUS DAILY PRN
Status: CANCELLED | OUTPATIENT
Start: 2023-09-22

## 2023-09-21 RX ORDER — ALBUTEROL SULFATE 90 UG/1
1-2 AEROSOL, METERED RESPIRATORY (INHALATION)
Status: CANCELLED
Start: 2023-09-22

## 2023-09-21 RX ORDER — HEPARIN SODIUM (PORCINE) LOCK FLUSH IV SOLN 100 UNIT/ML 100 UNIT/ML
5 SOLUTION INTRAVENOUS
Status: CANCELLED | OUTPATIENT
Start: 2023-09-22

## 2023-09-21 RX ORDER — ALBUTEROL SULFATE 0.83 MG/ML
2.5 SOLUTION RESPIRATORY (INHALATION)
Status: CANCELLED | OUTPATIENT
Start: 2023-09-22

## 2023-09-21 RX ORDER — DIPHENHYDRAMINE HYDROCHLORIDE 50 MG/ML
50 INJECTION INTRAMUSCULAR; INTRAVENOUS
Status: CANCELLED
Start: 2023-09-22

## 2023-09-21 RX ADMIN — Medication 5 ML: at 16:14

## 2023-09-21 RX ADMIN — ERTAPENEM SODIUM 500 MG: 1 INJECTION, POWDER, LYOPHILIZED, FOR SOLUTION INTRAMUSCULAR; INTRAVENOUS at 16:14

## 2023-09-21 ASSESSMENT — PAIN SCALES - GENERAL: PAINLEVEL: NO PAIN (0)

## 2023-09-22 ENCOUNTER — LAB (OUTPATIENT)
Dept: INFUSION THERAPY | Facility: CLINIC | Age: 73
End: 2023-09-22
Attending: INTERNAL MEDICINE
Payer: COMMERCIAL

## 2023-09-22 VITALS
SYSTOLIC BLOOD PRESSURE: 122 MMHG | RESPIRATION RATE: 16 BRPM | HEART RATE: 64 BPM | TEMPERATURE: 98 F | OXYGEN SATURATION: 100 % | DIASTOLIC BLOOD PRESSURE: 72 MMHG

## 2023-09-22 DIAGNOSIS — T83.511A URINARY TRACT INFECTION ASSOCIATED WITH INDWELLING URETHRAL CATHETER, INITIAL ENCOUNTER (H): Primary | ICD-10-CM

## 2023-09-22 DIAGNOSIS — N39.0 URINARY TRACT INFECTION ASSOCIATED WITH INDWELLING URETHRAL CATHETER, INITIAL ENCOUNTER (H): Primary | ICD-10-CM

## 2023-09-22 PROCEDURE — 96374 THER/PROPH/DIAG INJ IV PUSH: CPT

## 2023-09-22 PROCEDURE — 250N000009 HC RX 250: Performed by: INTERNAL MEDICINE

## 2023-09-22 PROCEDURE — 250N000011 HC RX IP 250 OP 636: Performed by: INTERNAL MEDICINE

## 2023-09-22 RX ORDER — DIPHENHYDRAMINE HYDROCHLORIDE 50 MG/ML
50 INJECTION INTRAMUSCULAR; INTRAVENOUS
Status: CANCELLED
Start: 2023-09-23

## 2023-09-22 RX ORDER — METHYLPREDNISOLONE SODIUM SUCCINATE 125 MG/2ML
125 INJECTION, POWDER, LYOPHILIZED, FOR SOLUTION INTRAMUSCULAR; INTRAVENOUS
Status: CANCELLED
Start: 2023-09-23

## 2023-09-22 RX ORDER — EPINEPHRINE 1 MG/ML
0.3 INJECTION, SOLUTION INTRAMUSCULAR; SUBCUTANEOUS EVERY 5 MIN PRN
Status: CANCELLED | OUTPATIENT
Start: 2023-09-23

## 2023-09-22 RX ORDER — MEPERIDINE HYDROCHLORIDE 25 MG/ML
25 INJECTION INTRAMUSCULAR; INTRAVENOUS; SUBCUTANEOUS EVERY 30 MIN PRN
Status: CANCELLED | OUTPATIENT
Start: 2023-09-23

## 2023-09-22 RX ORDER — ALBUTEROL SULFATE 90 UG/1
1-2 AEROSOL, METERED RESPIRATORY (INHALATION)
Status: CANCELLED
Start: 2023-09-23

## 2023-09-22 RX ORDER — HEPARIN SODIUM,PORCINE 10 UNIT/ML
5-20 VIAL (ML) INTRAVENOUS DAILY PRN
Status: CANCELLED | OUTPATIENT
Start: 2023-09-23

## 2023-09-22 RX ORDER — HEPARIN SODIUM (PORCINE) LOCK FLUSH IV SOLN 100 UNIT/ML 100 UNIT/ML
5 SOLUTION INTRAVENOUS
Status: CANCELLED | OUTPATIENT
Start: 2023-09-23

## 2023-09-22 RX ORDER — ALBUTEROL SULFATE 0.83 MG/ML
2.5 SOLUTION RESPIRATORY (INHALATION)
Status: CANCELLED | OUTPATIENT
Start: 2023-09-23

## 2023-09-22 RX ADMIN — ERTAPENEM SODIUM 500 MG: 1 INJECTION, POWDER, LYOPHILIZED, FOR SOLUTION INTRAMUSCULAR; INTRAVENOUS at 14:27

## 2023-09-22 NOTE — PROGRESS NOTES
Infusion Nursing Note:  Javi Celaya presents today for Invanz.    Patient seen by provider today: No   present during visit today: Not Applicable.    Note: N/A.      Intravenous Access:  Midline.    Treatment Conditions:  Not Applicable.      Post Infusion Assessment:  Patient tolerated infusion without incident.       Discharge Plan:   Discharge instructions reviewed with: Patient.  Patient and/or family verbalized understanding of discharge instructions and all questions answered.  AVS to patient via [employer].  Patient will return 9/23 at [facility] for next appointment.   Patient discharged in stable condition accompanied by: self and niece.  Departure Mode: Wheelchair.      Idalia Infante RN

## 2023-09-23 ENCOUNTER — INFUSION THERAPY VISIT (OUTPATIENT)
Dept: INFUSION THERAPY | Facility: CLINIC | Age: 73
End: 2023-09-23
Attending: INTERNAL MEDICINE
Payer: COMMERCIAL

## 2023-09-23 VITALS
SYSTOLIC BLOOD PRESSURE: 145 MMHG | DIASTOLIC BLOOD PRESSURE: 80 MMHG | OXYGEN SATURATION: 100 % | RESPIRATION RATE: 15 BRPM | TEMPERATURE: 97.9 F | HEART RATE: 61 BPM

## 2023-09-23 DIAGNOSIS — N39.0 URINARY TRACT INFECTION ASSOCIATED WITH INDWELLING URETHRAL CATHETER, INITIAL ENCOUNTER (H): Primary | ICD-10-CM

## 2023-09-23 DIAGNOSIS — T83.511A URINARY TRACT INFECTION ASSOCIATED WITH INDWELLING URETHRAL CATHETER, INITIAL ENCOUNTER (H): Primary | ICD-10-CM

## 2023-09-23 PROCEDURE — 250N000011 HC RX IP 250 OP 636: Performed by: INTERNAL MEDICINE

## 2023-09-23 PROCEDURE — 250N000009 HC RX 250: Performed by: INTERNAL MEDICINE

## 2023-09-23 PROCEDURE — 96374 THER/PROPH/DIAG INJ IV PUSH: CPT

## 2023-09-23 RX ORDER — METHYLPREDNISOLONE SODIUM SUCCINATE 125 MG/2ML
125 INJECTION, POWDER, LYOPHILIZED, FOR SOLUTION INTRAMUSCULAR; INTRAVENOUS
Status: CANCELLED
Start: 2023-09-24

## 2023-09-23 RX ORDER — EPINEPHRINE 1 MG/ML
0.3 INJECTION, SOLUTION INTRAMUSCULAR; SUBCUTANEOUS EVERY 5 MIN PRN
Status: CANCELLED | OUTPATIENT
Start: 2023-09-24

## 2023-09-23 RX ORDER — ALBUTEROL SULFATE 90 UG/1
1-2 AEROSOL, METERED RESPIRATORY (INHALATION)
Status: CANCELLED
Start: 2023-09-24

## 2023-09-23 RX ORDER — ALBUTEROL SULFATE 0.83 MG/ML
2.5 SOLUTION RESPIRATORY (INHALATION)
Status: CANCELLED | OUTPATIENT
Start: 2023-09-24

## 2023-09-23 RX ORDER — DIPHENHYDRAMINE HYDROCHLORIDE 50 MG/ML
50 INJECTION INTRAMUSCULAR; INTRAVENOUS
Status: CANCELLED
Start: 2023-09-24

## 2023-09-23 RX ORDER — HEPARIN SODIUM,PORCINE 10 UNIT/ML
5-20 VIAL (ML) INTRAVENOUS DAILY PRN
Status: CANCELLED | OUTPATIENT
Start: 2023-09-24

## 2023-09-23 RX ORDER — MEPERIDINE HYDROCHLORIDE 25 MG/ML
25 INJECTION INTRAMUSCULAR; INTRAVENOUS; SUBCUTANEOUS EVERY 30 MIN PRN
Status: CANCELLED | OUTPATIENT
Start: 2023-09-24

## 2023-09-23 RX ORDER — HEPARIN SODIUM (PORCINE) LOCK FLUSH IV SOLN 100 UNIT/ML 100 UNIT/ML
5 SOLUTION INTRAVENOUS
Status: CANCELLED | OUTPATIENT
Start: 2023-09-24

## 2023-09-23 RX ADMIN — ERTAPENEM SODIUM 500 MG: 1 INJECTION, POWDER, LYOPHILIZED, FOR SOLUTION INTRAMUSCULAR; INTRAVENOUS at 14:10

## 2023-09-23 NOTE — PROGRESS NOTES
Chief Complaint   Patient presents with    Infusion     ertapenem (INVanz) 500 mg in 5 mL SWFI for IVP     Infusion Nursing Note:  Javi Celaya presents today for infusion of ertapenem (INVanz) 500 mg in 5 mL SWFI for IVP.    Patient seen by provider today: No   present during visit today: Not Applicable.    Note: Patient presents for infusion of ertapenem (INVanz) 500 mg in 5 mL SWFI for IVP. PCA present. PCA present.     Intravenous Access:  Midline.    Treatment Conditions:  Not Applicable.      Post Infusion Assessment:  Patient tolerated infusion without incident.  Site patent and intact, free from redness, edema or discomfort.  No evidence of extravasations.       Discharge Plan:   Discharge instructions reviewed with: PCA and patient.  Patient and/or family verbalized understanding of discharge instructions and all questions answered.  Copy of AVS reviewed with patient and/or family. Patient will return 9/24/23 for next appointment.  Patient discharged in stable condition accompanied by: self and attendant.  Departure Mode: Wheelchair.    Administrations This Visit       ertapenem (INVanz) 500 mg in 5 mL SWFI for IVP       Admin Date  09/23/2023 Action  $Given Dose  500 mg Route  Intravenous Administered By  Lilli Woodson RN                  BP (!) 145/80 (BP Location: Right arm, Patient Position: Sitting, Cuff Size: Adult Regular)   Pulse 61   Temp 97.9  F (36.6  C) (Oral)   Resp 15   SpO2 100%     Carolynn Mariano, Nursing Student

## 2023-09-23 NOTE — PATIENT INSTRUCTIONS
Dear Javi Celaya    Thank you for choosing [facility] ([facility]) for your infusion.  The following information is a summary of our appointment as well as important reminders.      [phone] Home care    If you are a transplant patient and require transplant education, please click on this link: [email or web address].    We look forward in seeing you on your next appointment here at [facility] ([facility]).  Please don t hesitate to call us at [phone] to reschedule any of your appointments or to speak with one of the [facility] registered nurses.  It was a pleasure taking care of you today.    Sincerely,    [facility]  [facility]  [address] [place] [address]  [address]  Phone:  [phone]

## 2023-09-24 ENCOUNTER — HOSPITAL ENCOUNTER (EMERGENCY)
Facility: CLINIC | Age: 73
Discharge: HOME | End: 2023-09-24
Attending: EMERGENCY MEDICINE | Admitting: EMERGENCY MEDICINE
Payer: COMMERCIAL

## 2023-09-24 ENCOUNTER — INFUSION THERAPY VISIT (OUTPATIENT)
Dept: INFUSION THERAPY | Facility: CLINIC | Age: 73
End: 2023-09-24
Attending: INTERNAL MEDICINE
Payer: COMMERCIAL

## 2023-09-24 VITALS
RESPIRATION RATE: 16 BRPM | HEART RATE: 59 BPM | TEMPERATURE: 97.9 F | DIASTOLIC BLOOD PRESSURE: 79 MMHG | SYSTOLIC BLOOD PRESSURE: 166 MMHG | OXYGEN SATURATION: 99 %

## 2023-09-24 VITALS
DIASTOLIC BLOOD PRESSURE: 73 MMHG | RESPIRATION RATE: 16 BRPM | HEIGHT: 69 IN | TEMPERATURE: 97.3 F | OXYGEN SATURATION: 99 % | SYSTOLIC BLOOD PRESSURE: 161 MMHG | WEIGHT: 169 LBS | BODY MASS INDEX: 25.03 KG/M2 | HEART RATE: 57 BPM

## 2023-09-24 DIAGNOSIS — R31.9 HEMATURIA, UNSPECIFIED TYPE: ICD-10-CM

## 2023-09-24 DIAGNOSIS — D64.9 ANEMIA, UNSPECIFIED TYPE: ICD-10-CM

## 2023-09-24 DIAGNOSIS — Z97.8 CHRONIC INDWELLING FOLEY CATHETER: ICD-10-CM

## 2023-09-24 DIAGNOSIS — N39.0 URINARY TRACT INFECTION ASSOCIATED WITH INDWELLING URETHRAL CATHETER, INITIAL ENCOUNTER (H): Primary | ICD-10-CM

## 2023-09-24 DIAGNOSIS — N39.0 URINARY TRACT INFECTION ASSOCIATED WITH INDWELLING URETHRAL CATHETER, SUBSEQUENT ENCOUNTER: ICD-10-CM

## 2023-09-24 DIAGNOSIS — D69.6 THROMBOCYTOPENIA (H): ICD-10-CM

## 2023-09-24 DIAGNOSIS — T83.511A URINARY TRACT INFECTION ASSOCIATED WITH INDWELLING URETHRAL CATHETER, INITIAL ENCOUNTER (H): Primary | ICD-10-CM

## 2023-09-24 DIAGNOSIS — T83.511D URINARY TRACT INFECTION ASSOCIATED WITH INDWELLING URETHRAL CATHETER, SUBSEQUENT ENCOUNTER: ICD-10-CM

## 2023-09-24 LAB
ALBUMIN SERPL BCG-MCNC: 3.5 G/DL (ref 3.5–5.2)
ALP SERPL-CCNC: 112 U/L (ref 40–129)
ALT SERPL W P-5'-P-CCNC: 9 U/L (ref 0–70)
ANION GAP SERPL CALCULATED.3IONS-SCNC: 12 MMOL/L (ref 7–15)
AST SERPL W P-5'-P-CCNC: 17 U/L (ref 0–45)
BASOPHILS # BLD AUTO: 0.1 10E3/UL (ref 0–0.2)
BASOPHILS NFR BLD AUTO: 1 %
BILIRUB SERPL-MCNC: 0.4 MG/DL
BUN SERPL-MCNC: 35.3 MG/DL (ref 8–23)
CALCIUM SERPL-MCNC: 9.3 MG/DL (ref 8.8–10.2)
CHLORIDE SERPL-SCNC: 113 MMOL/L (ref 98–107)
CREAT SERPL-MCNC: 3.38 MG/DL (ref 0.67–1.17)
DEPRECATED HCO3 PLAS-SCNC: 19 MMOL/L (ref 22–29)
EGFRCR SERPLBLD CKD-EPI 2021: 19 ML/MIN/1.73M2
EOSINOPHIL # BLD AUTO: 0.2 10E3/UL (ref 0–0.7)
EOSINOPHIL NFR BLD AUTO: 3 %
ERYTHROCYTE [DISTWIDTH] IN BLOOD BY AUTOMATED COUNT: 14.2 % (ref 10–15)
GLUCOSE SERPL-MCNC: 152 MG/DL (ref 70–99)
HCT VFR BLD AUTO: 29.8 % (ref 40–53)
HGB BLD-MCNC: 9.8 G/DL (ref 13.3–17.7)
IMM GRANULOCYTES # BLD: 0 10E3/UL
IMM GRANULOCYTES NFR BLD: 0 %
LYMPHOCYTES # BLD AUTO: 2.5 10E3/UL (ref 0.8–5.3)
LYMPHOCYTES NFR BLD AUTO: 40 %
MCH RBC QN AUTO: 29 PG (ref 26.5–33)
MCHC RBC AUTO-ENTMCNC: 32.9 G/DL (ref 31.5–36.5)
MCV RBC AUTO: 88 FL (ref 78–100)
MONOCYTES # BLD AUTO: 0.7 10E3/UL (ref 0–1.3)
MONOCYTES NFR BLD AUTO: 11 %
NEUTROPHILS # BLD AUTO: 2.8 10E3/UL (ref 1.6–8.3)
NEUTROPHILS NFR BLD AUTO: 45 %
NRBC # BLD AUTO: 0 10E3/UL
NRBC BLD AUTO-RTO: 0 /100
PLATELET # BLD AUTO: 116 10E3/UL (ref 150–450)
POTASSIUM SERPL-SCNC: 5.5 MMOL/L (ref 3.4–5.3)
PROT SERPL-MCNC: 7.1 G/DL (ref 6.4–8.3)
RBC # BLD AUTO: 3.38 10E6/UL (ref 4.4–5.9)
SODIUM SERPL-SCNC: 144 MMOL/L (ref 136–145)
WBC # BLD AUTO: 6.2 10E3/UL (ref 4–11)

## 2023-09-24 PROCEDURE — 80053 COMPREHEN METABOLIC PANEL: CPT | Performed by: EMERGENCY MEDICINE

## 2023-09-24 PROCEDURE — 96374 THER/PROPH/DIAG INJ IV PUSH: CPT

## 2023-09-24 PROCEDURE — 85025 COMPLETE CBC W/AUTO DIFF WBC: CPT | Performed by: EMERGENCY MEDICINE

## 2023-09-24 PROCEDURE — 250N000011 HC RX IP 250 OP 636: Mod: JZ | Performed by: INTERNAL MEDICINE

## 2023-09-24 PROCEDURE — 99283 EMERGENCY DEPT VISIT LOW MDM: CPT

## 2023-09-24 PROCEDURE — 36415 COLL VENOUS BLD VENIPUNCTURE: CPT | Performed by: EMERGENCY MEDICINE

## 2023-09-24 PROCEDURE — 250N000009 HC RX 250: Performed by: INTERNAL MEDICINE

## 2023-09-24 RX ORDER — MEPERIDINE HYDROCHLORIDE 25 MG/ML
25 INJECTION INTRAMUSCULAR; INTRAVENOUS; SUBCUTANEOUS EVERY 30 MIN PRN
Status: CANCELLED | OUTPATIENT
Start: 2023-09-25

## 2023-09-24 RX ORDER — ALBUTEROL SULFATE 90 UG/1
1-2 AEROSOL, METERED RESPIRATORY (INHALATION)
Status: CANCELLED
Start: 2023-09-25

## 2023-09-24 RX ORDER — ALBUTEROL SULFATE 0.83 MG/ML
2.5 SOLUTION RESPIRATORY (INHALATION)
Status: CANCELLED | OUTPATIENT
Start: 2023-09-25

## 2023-09-24 RX ORDER — DIPHENHYDRAMINE HYDROCHLORIDE 50 MG/ML
50 INJECTION INTRAMUSCULAR; INTRAVENOUS
Status: CANCELLED
Start: 2023-09-25

## 2023-09-24 RX ORDER — HEPARIN SODIUM (PORCINE) LOCK FLUSH IV SOLN 100 UNIT/ML 100 UNIT/ML
5 SOLUTION INTRAVENOUS
Status: CANCELLED | OUTPATIENT
Start: 2023-09-25

## 2023-09-24 RX ORDER — EPINEPHRINE 1 MG/ML
0.3 INJECTION, SOLUTION INTRAMUSCULAR; SUBCUTANEOUS EVERY 5 MIN PRN
Status: CANCELLED | OUTPATIENT
Start: 2023-09-25

## 2023-09-24 RX ORDER — HEPARIN SODIUM,PORCINE 10 UNIT/ML
5-20 VIAL (ML) INTRAVENOUS DAILY PRN
Status: CANCELLED | OUTPATIENT
Start: 2023-09-25

## 2023-09-24 RX ORDER — METHYLPREDNISOLONE SODIUM SUCCINATE 125 MG/2ML
125 INJECTION, POWDER, LYOPHILIZED, FOR SOLUTION INTRAMUSCULAR; INTRAVENOUS
Status: CANCELLED
Start: 2023-09-25

## 2023-09-24 RX ADMIN — ERTAPENEM SODIUM 500 MG: 1 INJECTION, POWDER, LYOPHILIZED, FOR SOLUTION INTRAMUSCULAR; INTRAVENOUS at 13:28

## 2023-09-24 ASSESSMENT — PAIN SCALES - GENERAL: PAINLEVEL: NO PAIN (0)

## 2023-09-24 ASSESSMENT — ACTIVITIES OF DAILY LIVING (ADL): ADLS_ACUITY_SCORE: 33
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
## 2023-09-24 NOTE — PROGRESS NOTES
Infusion Nursing Note:  Javi Celaya presents today for invanz.    Patient seen by provider today: No   present during visit today: Not Applicable.    Note: Family reports pt has blood in his urine, which is new starting yesterday evening.  Family plans to bring him to the emergency room today after the visit for concerns of new blood in urine    Intravenous Access:  Midline.    Treatment Conditions:  Not Applicable.      Post Infusion Assessment:  Patient tolerated infusion without incident.  Blood return noted pre and post infusion.  Site patent and intact, free from redness, edema or discomfort.  No evidence of extravasations.       Discharge Plan:   Patient declined prescription refills.  Discharge instructions reviewed with: Patient.  Patient and/or family verbalized understanding of discharge instructions and all questions answered.  AVS to patient via [employer].  Patient will return 9/25/23 to [facility] for next appointment.   Patient discharged in stable condition accompanied by: Family.  Departure Mode: Wheelchair.      Lyssa Staley RN

## 2023-09-24 NOTE — ED TRIAGE NOTES
Blood in urine, has a starkey cath with a 500ml bag, they have emptied out 1000ml of cherry colored urine.  Today he was over across  the street infusion and was told to go to the ER due to blood in his urine.     Triage Assessment       Row Name 09/24/23 4060       Triage Assessment (Adult)    Airway WDL WDL       Respiratory WDL    Respiratory WDL WDL       Skin Circulation/Temperature WDL    Skin Circulation/Temperature WDL WDL       Cardiac WDL    Cardiac WDL WDL       Peripheral/Neurovascular WDL    Peripheral Neurovascular WDL WDL       Cognitive/Neuro/Behavioral WDL    Cognitive/Neuro/Behavioral WDL WDL

## 2023-09-25 ENCOUNTER — INFUSION THERAPY VISIT (OUTPATIENT)
Dept: INFUSION THERAPY | Facility: CLINIC | Age: 73
End: 2023-09-25
Attending: INTERNAL MEDICINE
Payer: COMMERCIAL

## 2023-09-25 VITALS
SYSTOLIC BLOOD PRESSURE: 176 MMHG | DIASTOLIC BLOOD PRESSURE: 78 MMHG | HEART RATE: 58 BPM | OXYGEN SATURATION: 99 % | TEMPERATURE: 97.7 F

## 2023-09-25 DIAGNOSIS — N39.0 URINARY TRACT INFECTION ASSOCIATED WITH INDWELLING URETHRAL CATHETER, INITIAL ENCOUNTER (H): Primary | ICD-10-CM

## 2023-09-25 DIAGNOSIS — T83.511A URINARY TRACT INFECTION ASSOCIATED WITH INDWELLING URETHRAL CATHETER, INITIAL ENCOUNTER (H): Primary | ICD-10-CM

## 2023-09-25 PROCEDURE — 96374 THER/PROPH/DIAG INJ IV PUSH: CPT

## 2023-09-25 PROCEDURE — 250N000011 HC RX IP 250 OP 636: Mod: JW | Performed by: INTERNAL MEDICINE

## 2023-09-25 PROCEDURE — 250N000009 HC RX 250: Performed by: INTERNAL MEDICINE

## 2023-09-25 RX ORDER — ALBUTEROL SULFATE 90 UG/1
1-2 AEROSOL, METERED RESPIRATORY (INHALATION)
Status: CANCELLED
Start: 2023-09-26

## 2023-09-25 RX ORDER — HEPARIN SODIUM,PORCINE 10 UNIT/ML
5-20 VIAL (ML) INTRAVENOUS DAILY PRN
Status: DISCONTINUED | OUTPATIENT
Start: 2023-09-25 | End: 2023-09-27 | Stop reason: HOSPADM

## 2023-09-25 RX ORDER — HEPARIN SODIUM,PORCINE 10 UNIT/ML
5-20 VIAL (ML) INTRAVENOUS DAILY PRN
Status: CANCELLED | OUTPATIENT
Start: 2023-09-26

## 2023-09-25 RX ORDER — MEPERIDINE HYDROCHLORIDE 25 MG/ML
25 INJECTION INTRAMUSCULAR; INTRAVENOUS; SUBCUTANEOUS EVERY 30 MIN PRN
Status: CANCELLED | OUTPATIENT
Start: 2023-09-26

## 2023-09-25 RX ORDER — DIPHENHYDRAMINE HYDROCHLORIDE 50 MG/ML
50 INJECTION INTRAMUSCULAR; INTRAVENOUS
Status: CANCELLED
Start: 2023-09-26

## 2023-09-25 RX ORDER — EPINEPHRINE 1 MG/ML
0.3 INJECTION, SOLUTION INTRAMUSCULAR; SUBCUTANEOUS EVERY 5 MIN PRN
Status: CANCELLED | OUTPATIENT
Start: 2023-09-26

## 2023-09-25 RX ORDER — ALBUTEROL SULFATE 0.83 MG/ML
2.5 SOLUTION RESPIRATORY (INHALATION)
Status: CANCELLED | OUTPATIENT
Start: 2023-09-26

## 2023-09-25 RX ORDER — HEPARIN SODIUM (PORCINE) LOCK FLUSH IV SOLN 100 UNIT/ML 100 UNIT/ML
5 SOLUTION INTRAVENOUS
Status: CANCELLED | OUTPATIENT
Start: 2023-09-26

## 2023-09-25 RX ORDER — METHYLPREDNISOLONE SODIUM SUCCINATE 125 MG/2ML
125 INJECTION, POWDER, LYOPHILIZED, FOR SOLUTION INTRAMUSCULAR; INTRAVENOUS
Status: CANCELLED
Start: 2023-09-26

## 2023-09-25 RX ADMIN — ERTAPENEM SODIUM 500 MG: 1 INJECTION, POWDER, LYOPHILIZED, FOR SOLUTION INTRAMUSCULAR; INTRAVENOUS at 16:33

## 2023-09-26 ENCOUNTER — NURSE TRIAGE (OUTPATIENT)
Dept: NURSING | Facility: CLINIC | Age: 73
End: 2023-09-26

## 2023-09-26 ENCOUNTER — PATIENT OUTREACH (OUTPATIENT)
Dept: CARE COORDINATION | Facility: CLINIC | Age: 73
End: 2023-09-26
Payer: COMMERCIAL

## 2023-09-26 ENCOUNTER — INFUSION THERAPY VISIT (OUTPATIENT)
Dept: INFUSION THERAPY | Facility: CLINIC | Age: 73
End: 2023-09-26
Attending: INTERNAL MEDICINE
Payer: COMMERCIAL

## 2023-09-26 VITALS
DIASTOLIC BLOOD PRESSURE: 80 MMHG | SYSTOLIC BLOOD PRESSURE: 190 MMHG | RESPIRATION RATE: 16 BRPM | HEART RATE: 56 BPM | TEMPERATURE: 97.4 F | OXYGEN SATURATION: 100 %

## 2023-09-26 DIAGNOSIS — T83.511A URINARY TRACT INFECTION ASSOCIATED WITH INDWELLING URETHRAL CATHETER, INITIAL ENCOUNTER (H): Primary | ICD-10-CM

## 2023-09-26 DIAGNOSIS — N39.0 URINARY TRACT INFECTION ASSOCIATED WITH INDWELLING URETHRAL CATHETER, INITIAL ENCOUNTER (H): Primary | ICD-10-CM

## 2023-09-26 PROCEDURE — 250N000009 HC RX 250: Performed by: INTERNAL MEDICINE

## 2023-09-26 PROCEDURE — 96374 THER/PROPH/DIAG INJ IV PUSH: CPT

## 2023-09-26 PROCEDURE — 250N000011 HC RX IP 250 OP 636: Performed by: INTERNAL MEDICINE

## 2023-09-26 RX ORDER — EPINEPHRINE 1 MG/ML
0.3 INJECTION, SOLUTION INTRAMUSCULAR; SUBCUTANEOUS EVERY 5 MIN PRN
Status: CANCELLED | OUTPATIENT
Start: 2023-09-26

## 2023-09-26 RX ORDER — DIPHENHYDRAMINE HYDROCHLORIDE 50 MG/ML
50 INJECTION INTRAMUSCULAR; INTRAVENOUS
Status: CANCELLED
Start: 2023-09-26

## 2023-09-26 RX ORDER — HEPARIN SODIUM (PORCINE) LOCK FLUSH IV SOLN 100 UNIT/ML 100 UNIT/ML
5 SOLUTION INTRAVENOUS
Status: CANCELLED | OUTPATIENT
Start: 2023-09-26

## 2023-09-26 RX ORDER — HEPARIN SODIUM,PORCINE 10 UNIT/ML
5-20 VIAL (ML) INTRAVENOUS DAILY PRN
Status: CANCELLED | OUTPATIENT
Start: 2023-09-26

## 2023-09-26 RX ORDER — METHYLPREDNISOLONE SODIUM SUCCINATE 125 MG/2ML
125 INJECTION, POWDER, LYOPHILIZED, FOR SOLUTION INTRAMUSCULAR; INTRAVENOUS
Status: CANCELLED
Start: 2023-09-26

## 2023-09-26 RX ORDER — MEPERIDINE HYDROCHLORIDE 25 MG/ML
25 INJECTION INTRAMUSCULAR; INTRAVENOUS; SUBCUTANEOUS EVERY 30 MIN PRN
Status: CANCELLED | OUTPATIENT
Start: 2023-09-26

## 2023-09-26 RX ORDER — ALBUTEROL SULFATE 90 UG/1
1-2 AEROSOL, METERED RESPIRATORY (INHALATION)
Status: CANCELLED
Start: 2023-09-26

## 2023-09-26 RX ORDER — ALBUTEROL SULFATE 0.83 MG/ML
2.5 SOLUTION RESPIRATORY (INHALATION)
Status: CANCELLED | OUTPATIENT
Start: 2023-09-26

## 2023-09-26 RX ADMIN — ERTAPENEM SODIUM 500 MG: 1 INJECTION, POWDER, LYOPHILIZED, FOR SOLUTION INTRAMUSCULAR; INTRAVENOUS at 16:28

## 2023-09-26 ASSESSMENT — PAIN SCALES - GENERAL: PAINLEVEL: NO PAIN (0)

## 2023-09-26 NOTE — TELEPHONE ENCOUNTER
Nurse Triage SBAR    Situation: Phone disconnected    Background:  PCA ,Jeny, calling.     Assessment: Phone disconnected when RN was asking if she was with the patient and about consents.     Recommendation: FNA attempted to call back but the number provided stated it was invalid.     Paulette Espinosa, RN Nursing Advisor 9/26/2023 5:15 PM   Reason for Disposition    Caller hangs up    Protocols used: Difficult Call-A-

## 2023-09-26 NOTE — PROGRESS NOTES
Infusion Nursing Note:  Javi Celaya presents today for   Chief Complaint   Patient presents with    Infusion     ertapenem (INVanz)       Patient seen by provider today: No   present during visit today: Not Applicable.    Note:   -Orders from Navdeep Pelayo MD completed. Frequency: daily x1wk; today is the last ordered dose.  -500mg Invanz IVP.    Intravenous Access:  Midline. Dressing changed.    Treatment Conditions:  Not Applicable.    Post Infusion Assessment:  Patient tolerated infusion without incident.  Blood return noted pre and post infusion.  Site patent and intact, free from redness, edema or discomfort.  No evidence of extravasations.    Discharge Plan:   Discharge instructions reviewed with: Patient and Yara (PCA).  Patient and/or family verbalized understanding of discharge instructions and all questions answered.  AVS to patient via [employer].      Patient will follow up with provider and care coordinator.     Patient discharged in stable condition accompanied by: attendant.  Departure Mode: Wheelchair.      Nora Carreon RN    BP (!) 190/80 (BP Location: Right arm, Cuff Size: Adult Regular)   Pulse 56   Temp 97.4  F (36.3  C)   Resp 16   SpO2 100%     Administrations This Visit       ertapenem (INVanz) 500 mg in 5 mL SWFI for IVP       Admin Date  09/26/2023 Action  $Given Dose  500 mg Route  Intravenous Administered By  Nora Carreon RN

## 2023-09-26 NOTE — PROGRESS NOTES
Social Work - Intervention  [facility]  Data/Intervention:    Patient Name: Javi Celaya Goes By: Javi    /Age: 1950 (73 year old)     Visit Type: telephone  Referral Source: Infusion Center  Reason for Referral: Not receiving needed home care services    Collaborated With:    -[facility] PRITI Wynne  -[facility] PRITI Potter- [phone]  -[facility]aiver  Olga (with [employer])- [phone]     Psychosocial Information/Concerns:  Received message from infusion center ROSAMARIA Melendez that Javi's PCA was at Javi's infusion center appointment yesterday and noted that home care has not been out since Javi was in the hospital, PCA cannot perform needed wound care, and due to bowel issues Javi's wound is not clean.     Per chart review Javi is not a [facility] patient, gets a good amount of care outside the  system, was recently discharged from [facility], and has since presented to [facility] ED. Per face sheet Javi's PCP is through the [facility].     Per chart review of recent hospitalization, resumption home care orders appear to have been sent to [facility].      Intervention/Education/Resources Provided:  I contacted the [facility] PRITI Díaz MADISON and she noted that Javi transferred his care to [facility] and is no longer a patient at her clinic.     I contacted [facility] and left a vm for the PRITI Potter explaining Javi's situation and asked for her assistance closing the gaps. Abbey later left me a vm explaining that Javi has not been seen at her clinic in about 6 months and his previous PCP was a resident and is no longer at the clinic. Abbey suggested I contact Javi's [facility] care coordinator Olga as she is more involved in Javi's case.     I contacted Olga and explained the message I received from ROSAMARIA Melendez. Olga confirmed she is Javi's [facility]  and she noted she is Javi's waiver  as well  and has worked with Javi for quite some time. Olga reported being aware of the hospital discharge plan and said she spoke with home care right after hospital discharge and they would be going out. Olga said she will call Javi today and will call home care right away to find out what is going on and to ensure that Javi is receiving the needed wound care. Olga also said it was on her list to check into follow up appointment plans with Javi and I noted the update I got from Abbey.   Olga noted that a lot of things with Javi's overall situation are messy and she said she tried to get Javi to agree to [facility] but he was not agreeable to that at all.     Per Olga Javi gets the following services through his waiver:  - Home making- 6 hours/week  - PCA- 7 hrs/day  - Health alert  - RN through [employer]  - Monthly medical supplies  - Special transportation    I updated the infusion center team that Olga is looking into things.      Assessment/Plan:  No follow up is planned on my part as Javi is not a [facility] patient and his [facility]/waiver  is addressing the current needs/concerns.        BREE Romo, [facility]    [facility]  Ph: [phone], Pgr: [phone]  9/26/2023

## 2023-09-26 NOTE — TELEPHONE ENCOUNTER
Trying to see if he is able to get his midline out. His last infusion was today. She tried to call Infectious disease provider, but they were not available. She wants to know: When is his next appointment?  He is to see a urologoist, stomach and liver specialist. He is supposed to have a MRI done. It has been ordered but not done yet because he had had something to drink. Appointments were made before he was discharged from [facility]. He was seen in the [facility] ER 2 days ago.   PCP: [facility] [place] [facility], [facility]. He does not want to return to [facility].   Urologist through [facility]: PCA is trying to reach this provider    Given [facility]'s Care Line phone number: [phone], PCA will call now.     Marcelle Garcia RN Triage Nurse Advisor 5:40 PM 9/26/2023

## 2023-09-26 NOTE — PATIENT INSTRUCTIONS
Dear Javi Celaya    Thank you for choosing [facility] ([facility]) for your Antibiotic infusion.      We look forward to seeing you at your next appointment here at [facility] ([facility]).  Please don t hesitate to call us at [phone] to reschedule any of your appointments or to speak with one of the [facility] registered nurses.  It was a pleasure taking care of you today.    Sincerely,    [facility]  [facility]  [address] [place] [address]  [address]  Phone:  [phone]

## 2023-09-27 NOTE — PROGRESS NOTES
Nursing Note  [facility]avi Celaya [facility] for:   Chief Complaint   Patient presents with    Infusion     IV invanz     During today's [facility] appointment, orders from  Navdeep Pelayo MD  were completed.  Frequency: daily    Progress note:  Patient identification verified by name and date of birth.  Assessment completed.  Vitals recorded in Doc Flowsheets.  Patient was provided with education regarding medication/procedure and possible side effects.  Patient verbalized understanding.     present during visit today: Not Applicable.    Treatment Conditions: Non-applicable.  Premedications: were not ordered.  Drug Waste Record: No  Infusion length and rate:  IVP over 5 minutes  Labs: were not ordered for this appointment.  Vascular access: midline accessed today  Is the next appt scheduled? Yes, 9/26    Post Infusion Assessment:  Patient tolerated infusion without incident.  Site patent and intact, free from redness, edema or discomfort.  No evidence of extravasations.  Access discontinued per protocol.     Discharge Plan:   Follow up plan of care with: infusion on 9/26 in [facility], also message sent to social work regarding questions from [facility] about home care for wound  Discharge instructions were reviewed with patient.  Patient/representative verbalized understanding of discharge instructions and all questions answered.  Patient discharged from [facility] in stable condition.    Nora Ahmadi, ROSAMARIA    Administrations This Visit       ertapenem (INVanz) 500 mg in 5 mL SWFI for IVP       Admin Date  09/25/2023 Action  $Given Dose  500 mg Route  Intravenous Administered By  Carol Ann Padilla RN                    BP (!) 176/78   Pulse 58   Temp 97.7  F (36.5  C)   SpO2 99%

## 2023-10-19 NOTE — PROGRESS NOTES
Infusion Nursing Note:  Javi Celaya presents today for Invanz.    Patient seen by provider today: No   present during visit today: Not Applicable.    Note: N/A.      Intravenous Access:  Midline.    Treatment Conditions:  Not Applicable.      Post Infusion Assessment:  Patient tolerated infusion without incident.  Site patent and intact, free from redness, edema or discomfort.  No evidence of extravasations.       Discharge Plan:   Discharge instructions reviewed with: Patient.  Patient and/or family verbalized understanding of discharge instructions and all questions answered.  Patient discharged in stable condition accompanied by: pca  Departure Mode: wheelchair      Vicky Russell RN

## 2024-05-04 ENCOUNTER — HEALTH MAINTENANCE LETTER (OUTPATIENT)
Age: 74
End: 2024-05-04

## 2024-07-13 ENCOUNTER — HEALTH MAINTENANCE LETTER (OUTPATIENT)
Age: 74
End: 2024-07-13

## 2024-11-30 ENCOUNTER — HEALTH MAINTENANCE LETTER (OUTPATIENT)
Age: 74
End: 2024-11-30

## 2025-06-07 ENCOUNTER — HEALTH MAINTENANCE LETTER (OUTPATIENT)
Age: 75
End: 2025-06-07

## (undated) DEVICE — SYR 10ML LL W/O NDL

## (undated) DEVICE — DRSG GAUZE 4X4" 2187

## (undated) DEVICE — SYR 03ML LL W/O NDL 309657

## (undated) DEVICE — SYR 05ML LL W/O NDL

## (undated) DEVICE — TUBING IV EXT SET MICRO VOLUME MALE LL ADAPTER 36" 2N3345

## (undated) DEVICE — PREP CHLORAPREP W/ORANGE TINT 10.5ML 260715

## (undated) DEVICE — DRAPE BACK TABLE  44X90" 8377

## (undated) DEVICE — NDL SPINAL 22GA 3.5" QUINCKE 405181

## (undated) DEVICE — NDL 18GAX1.5" 305185

## (undated) DEVICE — LABEL MEDICATION SYSTEM 3303-P

## (undated) DEVICE — GLOVE PROTEXIS POWDER FREE SMT 8.0  2D72PT80X

## (undated) DEVICE — NDL 27GA 1.25" 305136

## (undated) DEVICE — NDL COUNTER 20CT 31142493

## (undated) DEVICE — DRSG BANDAID 1X3" FABRIC CURITY LATEX FREE KC44101

## (undated) DEVICE — LINEN TOWEL PACK X5 5464

## (undated) DEVICE — NDL 25GA 1.5" 305127

## (undated) DEVICE — PEN MARKING SKIN TYCO DEVON DUAL TIP 31145868

## (undated) DEVICE — NDL BLUNT 18GA 1.5" W/O FILTER 305180

## (undated) RX ORDER — CIPROFLOXACIN 500 MG/1
TABLET, FILM COATED ORAL
Status: DISPENSED
Start: 2017-08-09